# Patient Record
Sex: FEMALE | Race: BLACK OR AFRICAN AMERICAN | NOT HISPANIC OR LATINO | Employment: FULL TIME | URBAN - METROPOLITAN AREA
[De-identification: names, ages, dates, MRNs, and addresses within clinical notes are randomized per-mention and may not be internally consistent; named-entity substitution may affect disease eponyms.]

---

## 2021-10-16 ENCOUNTER — HOSPITAL ENCOUNTER (EMERGENCY)
Facility: OTHER | Age: 26
Discharge: HOME | End: 2021-10-16
Attending: EMERGENCY MEDICINE
Payer: OTHER GOVERNMENT

## 2021-10-16 VITALS
HEIGHT: 64 IN | TEMPERATURE: 98 F | DIASTOLIC BLOOD PRESSURE: 67 MMHG | OXYGEN SATURATION: 99 % | WEIGHT: 140 LBS | HEART RATE: 89 BPM | BODY MASS INDEX: 23.9 KG/M2 | SYSTOLIC BLOOD PRESSURE: 145 MMHG | RESPIRATION RATE: 16 BRPM

## 2021-10-16 DIAGNOSIS — Z20.822 LAB TEST NEGATIVE FOR COVID-19 VIRUS: Primary | ICD-10-CM

## 2021-10-16 LAB
CTP QC/QA: YES
HCV AB SERPL QL IA: NEGATIVE
HIV 1+2 AB+HIV1 P24 AG SERPL QL IA: NEGATIVE
SARS-COV-2 RDRP RESP QL NAA+PROBE: NEGATIVE

## 2021-10-16 PROCEDURE — U0002 COVID-19 LAB TEST NON-CDC: HCPCS | Performed by: EMERGENCY MEDICINE

## 2021-10-16 PROCEDURE — 86803 HEPATITIS C AB TEST: CPT | Performed by: EMERGENCY MEDICINE

## 2021-10-16 PROCEDURE — 99283 EMERGENCY DEPT VISIT LOW MDM: CPT | Mod: 25

## 2021-10-16 PROCEDURE — 87389 HIV-1 AG W/HIV-1&-2 AB AG IA: CPT | Performed by: EMERGENCY MEDICINE

## 2021-11-11 ENCOUNTER — IMMUNIZATION (OUTPATIENT)
Dept: INTERNAL MEDICINE | Facility: CLINIC | Age: 26
End: 2021-11-11
Payer: OTHER GOVERNMENT

## 2021-11-11 DIAGNOSIS — Z23 NEED FOR VACCINATION: Primary | ICD-10-CM

## 2021-11-11 PROCEDURE — 0011A COVID-19, MRNA, LNP-S, PF, 100 MCG/0.5 ML DOSE VACCINE: CPT | Mod: PBBFAC

## 2021-12-07 ENCOUNTER — IMMUNIZATION (OUTPATIENT)
Dept: INTERNAL MEDICINE | Facility: CLINIC | Age: 26
End: 2021-12-07
Payer: OTHER GOVERNMENT

## 2021-12-07 DIAGNOSIS — Z23 NEED FOR VACCINATION: Primary | ICD-10-CM

## 2021-12-07 PROCEDURE — 0012A COVID-19, MRNA, LNP-S, PF, 100 MCG/0.5 ML DOSE VACCINE: CPT | Mod: PBBFAC

## 2023-02-24 ENCOUNTER — HOSPITAL ENCOUNTER (EMERGENCY)
Facility: OTHER | Age: 28
Discharge: HOME | End: 2023-02-24
Attending: EMERGENCY MEDICINE

## 2023-02-24 ENCOUNTER — OFFICE VISIT (OUTPATIENT)
Dept: URGENT CARE | Facility: CLINIC | Age: 28
End: 2023-02-24

## 2023-02-24 VITALS
OXYGEN SATURATION: 98 % | HEIGHT: 64 IN | WEIGHT: 153 LBS | BODY MASS INDEX: 26.12 KG/M2 | TEMPERATURE: 99 F | RESPIRATION RATE: 18 BRPM | DIASTOLIC BLOOD PRESSURE: 85 MMHG | SYSTOLIC BLOOD PRESSURE: 132 MMHG | HEART RATE: 79 BPM

## 2023-02-24 VITALS
WEIGHT: 153 LBS | HEIGHT: 64 IN | HEART RATE: 96 BPM | DIASTOLIC BLOOD PRESSURE: 75 MMHG | RESPIRATION RATE: 18 BRPM | SYSTOLIC BLOOD PRESSURE: 139 MMHG | BODY MASS INDEX: 26.12 KG/M2 | TEMPERATURE: 98 F | OXYGEN SATURATION: 100 %

## 2023-02-24 DIAGNOSIS — R10.9 ABDOMINAL PAIN, UNSPECIFIED ABDOMINAL LOCATION: ICD-10-CM

## 2023-02-24 DIAGNOSIS — R11.2 NAUSEA AND VOMITING, UNSPECIFIED VOMITING TYPE: Primary | ICD-10-CM

## 2023-02-24 DIAGNOSIS — Z32.01 POSITIVE PREGNANCY TEST: ICD-10-CM

## 2023-02-24 DIAGNOSIS — O20.0 THREATENED MISCARRIAGE IN EARLY PREGNANCY: ICD-10-CM

## 2023-02-24 LAB
ABO + RH BLD: NORMAL
ALBUMIN SERPL BCP-MCNC: 4.6 G/DL (ref 3.5–5.2)
ALP SERPL-CCNC: 58 U/L (ref 55–135)
ALT SERPL W/O P-5'-P-CCNC: 14 U/L (ref 10–44)
ANION GAP SERPL CALC-SCNC: 10 MMOL/L (ref 8–16)
AST SERPL-CCNC: 13 U/L (ref 10–40)
B-HCG UR QL: POSITIVE
B-HCG UR QL: POSITIVE
BACTERIA #/AREA URNS HPF: NORMAL /HPF
BASOPHILS # BLD AUTO: 0.04 K/UL (ref 0–0.2)
BASOPHILS NFR BLD: 0.3 % (ref 0–1.9)
BILIRUB SERPL-MCNC: 0.9 MG/DL (ref 0.1–1)
BILIRUB UR QL STRIP: NEGATIVE
BILIRUB UR QL STRIP: NEGATIVE
BLD GP AB SCN CELLS X3 SERPL QL: NORMAL
BUN SERPL-MCNC: 13 MG/DL (ref 6–20)
CALCIUM SERPL-MCNC: 9.8 MG/DL (ref 8.7–10.5)
CHLORIDE SERPL-SCNC: 101 MMOL/L (ref 95–110)
CLARITY UR: CLEAR
CO2 SERPL-SCNC: 25 MMOL/L (ref 23–29)
COLOR UR: YELLOW
CREAT SERPL-MCNC: 0.8 MG/DL (ref 0.5–1.4)
CTP QC/QA: YES
CTP QC/QA: YES
DIFFERENTIAL METHOD: ABNORMAL
EOSINOPHIL # BLD AUTO: 0.1 K/UL (ref 0–0.5)
EOSINOPHIL NFR BLD: 0.7 % (ref 0–8)
ERYTHROCYTE [DISTWIDTH] IN BLOOD BY AUTOMATED COUNT: 12.5 % (ref 11.5–14.5)
EST. GFR  (NO RACE VARIABLE): >60 ML/MIN/1.73 M^2
GLUCOSE SERPL-MCNC: 110 MG/DL (ref 70–110)
GLUCOSE UR QL STRIP: NEGATIVE
GLUCOSE UR QL STRIP: NEGATIVE
HCG INTACT+B SERPL-ACNC: NORMAL MIU/ML
HCT VFR BLD AUTO: 40.3 % (ref 37–48.5)
HCV AB SERPL QL IA: NEGATIVE
HGB BLD-MCNC: 14.2 G/DL (ref 12–16)
HGB UR QL STRIP: NEGATIVE
HIV 1+2 AB+HIV1 P24 AG SERPL QL IA: NEGATIVE
HYALINE CASTS #/AREA URNS LPF: 0 /LPF
IMM GRANULOCYTES # BLD AUTO: 0.04 K/UL (ref 0–0.04)
IMM GRANULOCYTES NFR BLD AUTO: 0.3 % (ref 0–0.5)
INJECT RH IG VOL PATIENT: NORMAL ML
KETONES UR QL STRIP: ABNORMAL
KETONES UR QL STRIP: POSITIVE
LEUKOCYTE ESTERASE UR QL STRIP: NEGATIVE
LEUKOCYTE ESTERASE UR QL STRIP: NEGATIVE
LYMPHOCYTES # BLD AUTO: 1.7 K/UL (ref 1–4.8)
LYMPHOCYTES NFR BLD: 13.7 % (ref 18–48)
MCH RBC QN AUTO: 30.1 PG (ref 27–31)
MCHC RBC AUTO-ENTMCNC: 35.2 G/DL (ref 32–36)
MCV RBC AUTO: 86 FL (ref 82–98)
MICROSCOPIC COMMENT: NORMAL
MONOCYTES # BLD AUTO: 0.8 K/UL (ref 0.3–1)
MONOCYTES NFR BLD: 6.7 % (ref 4–15)
NEUTROPHILS # BLD AUTO: 9.4 K/UL (ref 1.8–7.7)
NEUTROPHILS NFR BLD: 78.3 % (ref 38–73)
NITRITE UR QL STRIP: NEGATIVE
NRBC BLD-RTO: 0 /100 WBC
PH UR STRIP: 6 [PH] (ref 5–8)
PH, POC UA: 5
PLATELET # BLD AUTO: 307 K/UL (ref 150–450)
PMV BLD AUTO: 10.6 FL (ref 9.2–12.9)
POC BLOOD, URINE: NEGATIVE
POC NITRATES, URINE: NEGATIVE
POTASSIUM SERPL-SCNC: 3.4 MMOL/L (ref 3.5–5.1)
PROT SERPL-MCNC: 8.2 G/DL (ref 6–8.4)
PROT UR QL STRIP: ABNORMAL
PROT UR QL STRIP: POSITIVE
RBC # BLD AUTO: 4.71 M/UL (ref 4–5.4)
RBC #/AREA URNS HPF: 1 /HPF (ref 0–4)
RH BLD: NORMAL
SODIUM SERPL-SCNC: 136 MMOL/L (ref 136–145)
SP GR UR STRIP: 1 (ref 1–1.03)
SP GR UR STRIP: 1.03 (ref 1–1.03)
SQUAMOUS #/AREA URNS HPF: 5 /HPF
URN SPEC COLLECT METH UR: ABNORMAL
UROBILINOGEN UR STRIP-ACNC: ABNORMAL (ref 0.1–1.1)
UROBILINOGEN UR STRIP-ACNC: NEGATIVE EU/DL
WBC # BLD AUTO: 12.02 K/UL (ref 3.9–12.7)
WBC #/AREA URNS HPF: 3 /HPF (ref 0–5)

## 2023-02-24 PROCEDURE — 96374 THER/PROPH/DIAG INJ IV PUSH: CPT

## 2023-02-24 PROCEDURE — 96361 HYDRATE IV INFUSION ADD-ON: CPT

## 2023-02-24 PROCEDURE — 86900 BLOOD TYPING SEROLOGIC ABO: CPT | Performed by: EMERGENCY MEDICINE

## 2023-02-24 PROCEDURE — 25000003 PHARM REV CODE 250: Performed by: EMERGENCY MEDICINE

## 2023-02-24 PROCEDURE — 99203 PR OFFICE/OUTPT VISIT, NEW, LEVL III, 30-44 MIN: ICD-10-PCS | Mod: TIER,S$GLB,, | Performed by: NURSE PRACTITIONER

## 2023-02-24 PROCEDURE — 87389 HIV-1 AG W/HIV-1&-2 AB AG IA: CPT | Performed by: EMERGENCY MEDICINE

## 2023-02-24 PROCEDURE — 81025 POCT URINE PREGNANCY: ICD-10-PCS | Mod: TIER,S$GLB,, | Performed by: NURSE PRACTITIONER

## 2023-02-24 PROCEDURE — 81003 POCT URINALYSIS, DIPSTICK, AUTOMATED, W/O SCOPE: ICD-10-PCS | Mod: QW,TIER,S$GLB, | Performed by: NURSE PRACTITIONER

## 2023-02-24 PROCEDURE — 86901 BLOOD TYPING SEROLOGIC RH(D): CPT | Performed by: EMERGENCY MEDICINE

## 2023-02-24 PROCEDURE — 84702 CHORIONIC GONADOTROPIN TEST: CPT | Performed by: EMERGENCY MEDICINE

## 2023-02-24 PROCEDURE — 85025 COMPLETE CBC W/AUTO DIFF WBC: CPT | Performed by: NURSE PRACTITIONER

## 2023-02-24 PROCEDURE — 99203 OFFICE O/P NEW LOW 30 MIN: CPT | Mod: TIER,S$GLB,, | Performed by: NURSE PRACTITIONER

## 2023-02-24 PROCEDURE — 81000 URINALYSIS NONAUTO W/SCOPE: CPT | Performed by: NURSE PRACTITIONER

## 2023-02-24 PROCEDURE — 63600175 PHARM REV CODE 636 W HCPCS: Performed by: EMERGENCY MEDICINE

## 2023-02-24 PROCEDURE — 81025 URINE PREGNANCY TEST: CPT | Performed by: NURSE PRACTITIONER

## 2023-02-24 PROCEDURE — 81003 URINALYSIS AUTO W/O SCOPE: CPT | Mod: QW,TIER,S$GLB, | Performed by: NURSE PRACTITIONER

## 2023-02-24 PROCEDURE — 86803 HEPATITIS C AB TEST: CPT | Performed by: EMERGENCY MEDICINE

## 2023-02-24 PROCEDURE — 96372 THER/PROPH/DIAG INJ SC/IM: CPT | Mod: 59 | Performed by: EMERGENCY MEDICINE

## 2023-02-24 PROCEDURE — 99285 EMERGENCY DEPT VISIT HI MDM: CPT | Mod: 25

## 2023-02-24 PROCEDURE — 86850 RBC ANTIBODY SCREEN: CPT | Performed by: EMERGENCY MEDICINE

## 2023-02-24 PROCEDURE — 63600519 RHOGAM PHARM REV CODE 636 ALT 250 W HCPCS: Performed by: EMERGENCY MEDICINE

## 2023-02-24 PROCEDURE — 80053 COMPREHEN METABOLIC PANEL: CPT | Performed by: NURSE PRACTITIONER

## 2023-02-24 PROCEDURE — 81025 URINE PREGNANCY TEST: CPT | Mod: TIER,S$GLB,, | Performed by: NURSE PRACTITIONER

## 2023-02-24 RX ORDER — ONDANSETRON 2 MG/ML
4 INJECTION INTRAMUSCULAR; INTRAVENOUS
Status: COMPLETED | OUTPATIENT
Start: 2023-02-24 | End: 2023-02-24

## 2023-02-24 RX ORDER — DEXTROSE, SODIUM CHLORIDE, SODIUM LACTATE, POTASSIUM CHLORIDE, AND CALCIUM CHLORIDE 5; .6; .31; .03; .02 G/100ML; G/100ML; G/100ML; G/100ML; G/100ML
INJECTION, SOLUTION INTRAVENOUS
Status: COMPLETED | OUTPATIENT
Start: 2023-02-24 | End: 2023-02-24

## 2023-02-24 RX ORDER — SODIUM CHLORIDE 9 MG/ML
1000 INJECTION, SOLUTION INTRAVENOUS
Status: COMPLETED | OUTPATIENT
Start: 2023-02-24 | End: 2023-02-24

## 2023-02-24 RX ORDER — ONDANSETRON 4 MG/1
4 TABLET, ORALLY DISINTEGRATING ORAL EVERY 6 HOURS PRN
Qty: 10 TABLET | Refills: 0 | Status: SHIPPED | OUTPATIENT
Start: 2023-02-24 | End: 2023-03-01 | Stop reason: SDUPTHER

## 2023-02-24 RX ADMIN — ONDANSETRON 4 MG: 2 INJECTION INTRAMUSCULAR; INTRAVENOUS at 05:02

## 2023-02-24 RX ADMIN — SODIUM CHLORIDE 1000 ML: 9 INJECTION, SOLUTION INTRAVENOUS at 05:02

## 2023-02-24 RX ADMIN — HUMAN RHO(D) IMMUNE GLOBULIN 300 MCG: 300 INJECTION, SOLUTION INTRAMUSCULAR at 08:02

## 2023-02-24 RX ADMIN — SODIUM CHLORIDE, SODIUM LACTATE, POTASSIUM CHLORIDE, CALCIUM CHLORIDE AND DEXTROSE MONOHYDRATE: 5; 600; 310; 30; 20 INJECTION, SOLUTION INTRAVENOUS at 07:02

## 2023-02-24 NOTE — PROGRESS NOTES
"Subjective:       Patient ID: Kika Johnson is a 28 y.o. female.    Vitals:  height is 5' 4" (1.626 m) and weight is 69.4 kg (153 lb). Her temperature is 99 °F (37.2 °C). Her blood pressure is 132/85 and her pulse is 79. Her respiration is 18 and oxygen saturation is 98%.     Chief Complaint: Nausea    Patient state she has been vomiting and having diarrhea for the past 2-3 days.  Provider note begins below    Patient reports menstrual cycles 1 month light.  She is been vomiting and having diarrhea for the last 2 days.  Not keeping any liquids or food down.  Reports having chills.    Diarrhea   This is a new problem. The current episode started in the past 7 days. The problem has been gradually worsening. Associated symptoms include chills, a fever and vomiting. Pertinent negatives include no coughing. Nothing aggravates the symptoms. She has tried nothing for the symptoms.     Constitution: Positive for chills, fatigue and fever.   Cardiovascular:  Negative for chest pain and sob on exertion.   Respiratory:  Negative for cough and shortness of breath.    Gastrointestinal:  Positive for nausea, vomiting and diarrhea.     Objective:      Physical Exam   Constitutional: She is oriented to person, place, and time.   HENT:   Head: Normocephalic and atraumatic.   Cardiovascular: Normal rate.   Pulmonary/Chest: Effort normal. No respiratory distress.   Abdominal: Normal appearance.   Neurological: She is alert and oriented to person, place, and time.   Skin: Skin is warm and dry.   Psychiatric: Her behavior is normal. Mood normal.         Results for orders placed or performed in visit on 02/24/23   POCT Urinalysis, Dipstick, Automated, W/O Scope   Result Value Ref Range    POC Blood, Urine Negative Negative    POC Bilirubin, Urine Negative Negative    POC Urobilinogen, Urine norm 0.1 - 1.1    POC Ketones, Urine Positive (A) Negative    POC Protein, Urine Positive (A) Negative    POC Nitrates, Urine Negative Negative    " POC Glucose, Urine Negative Negative    pH, UA 5.0     POC Specific Gravity, Urine 1.005 1.003 - 1.029    POC Leukocytes, Urine Negative Negative   POCT URINE PREGNANCY   Result Value Ref Range    POC Preg Test, Ur Positive (A) Negative     Acceptable Yes       Assessment:       1. Nausea and vomiting, unspecified vomiting type    2. Positive pregnancy test    3. Abdominal pain, unspecified abdominal location          Plan:       UA  UPT positive  ED precautions  Dehydration.  Concern for tubal pregnancy        Nausea and vomiting, unspecified vomiting type  -     POCT Urinalysis, Dipstick, Automated, W/O Scope  -     POCT URINE PREGNANCY    Positive pregnancy test    Abdominal pain, unspecified abdominal location

## 2023-02-24 NOTE — ED NOTES
Patient identifiers verified and correct for patient  C/C: n/v/d.    APPEARANCE: AAOx3.   SKIN: warm, dry and intact. No breakdown or bruising. Dry mucous membranes  MUSCULOSKELETAL: Patient moving all extremities spontaneously, no obvious swelling or deformities noted. Ambulates independently. Generalized weakness.  RESPIRATORY: Denies shortness of breath.Respirations unlabored. Clear upon auscultation.  CARDIAC: Denies CP,  distal pulses; no peripheral edema  ABDOMEN: abdominal cramping generalized w/ diarrhea. Estimated 20 episodes yesterday.3 episodes today. Nausea for several weeks with worsening over past 2 days. Reports being unable to eat or drink.UPT positive  : voids spontaneously, denies difficulty  Neurologic: AAO x 4; follows commands equal strength in all extremities; denies numbness/tingling. Denies dizziness

## 2023-02-24 NOTE — FIRST PROVIDER EVALUATION
Emergency Department TeleTriage Encounter Note      CHIEF COMPLAINT    Chief Complaint   Patient presents with    Abdominal Pain     Pt reports generalized abdominal pain, vaginal spotting, n/v/d, abdominal pain x 3 days; Pt took positive UPT today, states LMP January 13       VITAL SIGNS   Initial Vitals [02/24/23 1437]   BP Pulse Resp Temp SpO2   (!) 159/89 92 20 98.2 °F (36.8 °C) 100 %      MAP       --            ALLERGIES    Review of patient's allergies indicates:  No Known Allergies    PROVIDER TRIAGE NOTE  TeleTriage Note: Kika Johnson, a nontoxic/well appearing, 28 y.o. female, presented to the ED with c/o left lower abdominal pain and vaginal bleeding that began Monday. LMP 1/13/23. First pregnancy.    All ED beds are full at present; patient notified of this status.  Patient seen and medically screened by Nurse Practitioner via teletriage. Orders initiated at triage to expedite care.  Patient is stable to return to the waiting room and will be placed in an ED bed when available.  Care will be transferred to an alternate provider when patient has been placed in an Exam Room from the [facility] for physical exam, additional orders, and disposition.  2:50 PM Elsa Dickinson, DNP, FNP-C        ORDERS  Labs Reviewed   HIV 1 / 2 ANTIBODY   HEPATITIS C ANTIBODY   CBC W/ AUTO DIFFERENTIAL   COMPREHENSIVE METABOLIC PANEL   URINALYSIS, REFLEX TO URINE CULTURE   POCT URINE PREGNANCY       ED Orders (720h ago, onward)      Start Ordered     Status Ordering Provider    02/24/23 1453 02/24/23 1452  POCT urine pregnancy  Once         Ordered ELSA DICKINSON    02/24/23 1453 02/24/23 1452  Urinalysis, Reflex to Urine Culture Urine, Clean Catch  STAT         Ordered ELSA DICKINSON    02/24/23 1452 02/24/23 1452  Insert peripheral IV  Continuous         Ordered ELSA DICKINSON    02/24/23 1452 02/24/23 1452  CBC auto differential  STAT         Ordered ELSA DICKINSON    02/24/23 1452 02/24/23 1452  Comprehensive  metabolic panel  STAT         Ordered FACUNDO FRANK.    02/24/23 1452 02/24/23 1452  US OB <14 Wks TransAbd & TransVag, Single Gestation (XPD)  1 time imaging         Ordered FACUNDO FRANK.    02/24/23 1439 02/24/23 1438  HIV 1/2 Ag/Ab (4th Gen)  STAT         Ordered JUSTINE ROSA    02/24/23 1439 02/24/23 1438  Hepatitis C Antibody  STAT         Ordered JUSTINE ROSA.              Virtual Visit Note: The provider triage portion of this emergency department evaluation and documentation was performed via [employer], a HIPAA-compliant telemedicine application, in concert with a tele-presenter in the room. A face to face patient evaluation with one of my colleagues will occur once the patient is placed in an emergency department room.      DISCLAIMER: This note was prepared with [employer] voice recognition transcription software. Garbled syntax, mangled pronouns, and other bizarre constructions may be attributed to that software system.

## 2023-02-24 NOTE — ED PROVIDER NOTES
"SCRIBE #1 NOTE: I, Priscilla Almazan, am scribing for, and in the presence of,  Andre Renteria DO.       Source of History:  The patient.    Chief complaint:  Abdominal Pain (Pt reports generalized abdominal pain, vaginal spotting, n/v/d, abdominal pain x 3 days; Pt took positive UPT today, states LMP )      HPI:  Kika Johnson is a  28 y.o. female presenting with abdominal pain that began four days ago. The patient also reports nausea, vomiting, and diarrhea, including episodes of vomiting today. She states she is unable to retain food or liquids. The patient took a positive at home pregnancy test today. She notes intermittent mild vaginal spotting over the past two weeks. She denies associated dysuria.     This is the extent to the patients complaints today here in the emergency department.    ROS:   See HPI.    Review of patient's allergies indicates:  No Known Allergies    PMH:  As per HPI and below:  No past medical history on file.  No past surgical history on file.    Social History     Tobacco Use    Smoking status: Never    Smokeless tobacco: Never   Substance Use Topics    Alcohol use: Yes    Drug use: Yes     Types: Marijuana       Physical Exam:    /75   Pulse 96   Temp 98.2 °F (36.8 °C) (Oral)   Resp 18   Ht 5' 4" (1.626 m)   Wt 69.4 kg (153 lb)   LMP 2023   SpO2 100%   BMI 26.26 kg/m²   Nursing note and vital signs reviewed.  Constitutional: No acute distress.  Nontoxic  Cardiovascular: Regular rate and rhythm.  No murmurs. No gallops. No rubs  Respiratory: Clear to auscultation bilaterally.  Good air movement.  No wheezes.  No rhonchi. No rales. No accessory muscle use.  Abdomen:  Mild mid and epigastric tenderness to palpation. Soft.  Not distended.  No guarding.  No rebound. Non-peritoneal.  Neuro: Alert. No focal deficits.  Skin: No rashes seen.     I decided to obtain the patient's medical records.  Summary of Medical Records:      MDM/ Differential Dx: "   28-year-old female presents with abdominal pain.  Symptoms likely secondary to viral etiology.  Also found out she was pregnant so could be due to this as well.  Ultrasound confirms intrauterine uterus.  As she did have some spotting will get an Rh to evaluate for need for RhoGAM.  Otherwise appears well.  Will give IV fluids get blood work to evaluate for metabolic derangement, acute kidney injury or dehydration.  Her abdominal exam otherwise is benign do not feel there is any indication for imaging as I have considered but do not suspect diverticulitis or appendicitis or cholelithiasis.    Social Concerns:  Patient is pregnant so likelihood if this is hyperemesis gravidarum will return     ED Course as of 02/24/23 2108 Fri Feb 24, 2023   1650 US OB <14 Wks TransAbd & TransVag, Single Gestation (XPD)  Ultrasound shows live IUP with gestational age of 6 weeks 0 days.  Heart rate 120. [SM]   1651 WBC: 12.02 [SM]   1651 Hemoglobin: 14.2 [SM]   1651 Platelets: 307 [SM]   1651 Creatinine: 0.8 [SM]   1651 Sodium: 136 [SM]   1651 Potassium(!): 3.4 [SM]   1651 Ketones, UA(!): 3+  Consistent with dehydration   [SM]   1738 Upon re-evaluation patient is feeling much better.  No longer nauseated.  Will finish IV fluids do a p.o. challenge and then plan for discharge. [SM]   1738 Urinalysis Microscopic  Negative for suspicion of UTI. [SM]   1835 Rh Type: NEG  Will need rhogam prior to being discharged as she stated she has some vaginal spotting.   [SM]   1858 Discussed the results with the patient.  Discussed the need for RhoGAM.  After she receives this will go ahead and discharge.  Will place a referral for OB for care for her pregnancy.      No further workup is indicated in the emergency department today.  I updated pt regarding results and I counseled pt regarding supportive care measures.  Diagnosis and treatment plan explained to patient. I have answered all questions and the patient is satisfied with the plan of  care. Patient discharged home in stable condition.      [SM]      ED Course User Index  [SM] Andre Renteria DO              Scribe Attestation:   Scribe #1: I performed the above scribed service and the documentation accurately describes the services I performed. I attest to the accuracy of the note.    Physician Attestation for Scribe: I, Dr Andre Renteria, reviewed documentation as scribed in my presence, which is both accurate and complete.    Diagnostic Impression:    1. Nausea and vomiting, unspecified vomiting type    2. Threatened miscarriage in early pregnancy         ED Disposition Condition    Discharge Stable            ED Prescriptions       Medication Sig Dispense Start Date End Date Auth. Provider    ondansetron (ZOFRAN-ODT) 4 MG TbDL Take 1 tablet (4 mg total) by mouth every 6 (six) hours as needed (nausea vomiting). 10 tablet 2/24/2023 3/3/2023 Andre Renteria DO          Follow-up Information       Follow up With Specialties Details Why Contact [facility] Obstetrics and Gynecology Schedule an appointment as soon as possible for a visit   [address], [address]  [facility] [address]  [phone]             Andre Renteria DO  02/24/23 1913

## 2023-02-25 NOTE — DISCHARGE INSTRUCTIONS
Both medications are over the counter at the pharmacy.    Take together up to 3 times per day.    Pyridoxine (Vitamin B6) - 25 mg  doxylamine (unisom) - 25 mg

## 2023-02-25 NOTE — ED TRIAGE NOTES
Pt presents to ER with complaint of nausea, vomiting and generalized abdominal pain. Pt also reports light vaginal bleeding. Pt reports LMP of  A0. Pt in no acute distress at this time with chest noted to equal rise and fall. Pt AAOx4.

## 2023-03-01 ENCOUNTER — INITIAL PRENATAL (OUTPATIENT)
Dept: OBSTETRICS AND GYNECOLOGY | Facility: CLINIC | Age: 28
End: 2023-03-01
Attending: EMERGENCY MEDICINE
Payer: COMMERCIAL

## 2023-03-01 DIAGNOSIS — O26.891 VAGINAL DISCHARGE DURING PREGNANCY IN FIRST TRIMESTER: ICD-10-CM

## 2023-03-01 DIAGNOSIS — Z11.51 SCREENING FOR HPV (HUMAN PAPILLOMAVIRUS): ICD-10-CM

## 2023-03-01 DIAGNOSIS — O21.9 NAUSEA AND VOMITING IN PREGNANCY: ICD-10-CM

## 2023-03-01 DIAGNOSIS — Z12.4 ENCOUNTER FOR PAPANICOLAOU SMEAR FOR CERVICAL CANCER SCREENING: ICD-10-CM

## 2023-03-01 DIAGNOSIS — N91.2 AMENORRHEA: Primary | ICD-10-CM

## 2023-03-01 DIAGNOSIS — O20.0 THREATENED MISCARRIAGE IN EARLY PREGNANCY: ICD-10-CM

## 2023-03-01 DIAGNOSIS — N89.8 VAGINAL DISCHARGE DURING PREGNANCY IN FIRST TRIMESTER: ICD-10-CM

## 2023-03-01 PROCEDURE — 87591 N.GONORRHOEAE DNA AMP PROB: CPT | Performed by: REGISTERED NURSE

## 2023-03-01 PROCEDURE — 99213 PR OFFICE/OUTPT VISIT, EST, LEVL III, 20-29 MIN: ICD-10-PCS | Mod: S$GLB,,, | Performed by: REGISTERED NURSE

## 2023-03-01 PROCEDURE — 81514 NFCT DS BV&VAGINITIS DNA ALG: CPT | Performed by: REGISTERED NURSE

## 2023-03-01 PROCEDURE — 87624 HPV HI-RISK TYP POOLED RSLT: CPT | Performed by: REGISTERED NURSE

## 2023-03-01 PROCEDURE — 99999 PR PBB SHADOW E&M-EST. PATIENT-LVL II: CPT | Mod: PBBFAC,,, | Performed by: REGISTERED NURSE

## 2023-03-01 PROCEDURE — 99213 OFFICE O/P EST LOW 20 MIN: CPT | Mod: S$GLB,,, | Performed by: REGISTERED NURSE

## 2023-03-01 PROCEDURE — 99999 PR PBB SHADOW E&M-EST. PATIENT-LVL II: ICD-10-PCS | Mod: PBBFAC,,, | Performed by: REGISTERED NURSE

## 2023-03-01 PROCEDURE — 88142 CYTOPATH C/V THIN LAYER: CPT | Performed by: REGISTERED NURSE

## 2023-03-01 RX ORDER — ONDANSETRON 4 MG/1
4 TABLET, FILM COATED ORAL EVERY 6 HOURS PRN
Qty: 30 TABLET | Refills: 1 | Status: SHIPPED | OUTPATIENT
Start: 2023-03-01 | End: 2023-04-09 | Stop reason: SDUPTHER

## 2023-03-03 LAB
C TRACH DNA SPEC QL NAA+PROBE: NOT DETECTED
N GONORRHOEA DNA SPEC QL NAA+PROBE: NOT DETECTED

## 2023-03-04 LAB
BACTERIAL VAGINOSIS DNA: POSITIVE
CANDIDA GLABRATA DNA: NEGATIVE
CANDIDA KRUSEI DNA: NEGATIVE
CANDIDA RRNA VAG QL PROBE: NEGATIVE
T VAGINALIS RRNA GENITAL QL PROBE: NEGATIVE

## 2023-03-07 LAB
FINAL PATHOLOGIC DIAGNOSIS: NORMAL
Lab: NORMAL

## 2023-03-08 LAB
HPV HR 12 DNA SPEC QL NAA+PROBE: NEGATIVE
HPV16 AG SPEC QL: NEGATIVE
HPV18 DNA SPEC QL NAA+PROBE: NEGATIVE

## 2023-03-09 ENCOUNTER — HOSPITAL ENCOUNTER (EMERGENCY)
Facility: OTHER | Age: 28
Discharge: HOME | End: 2023-03-09
Attending: EMERGENCY MEDICINE
Payer: COMMERCIAL

## 2023-03-09 VITALS
TEMPERATURE: 98 F | WEIGHT: 145 LBS | RESPIRATION RATE: 15 BRPM | SYSTOLIC BLOOD PRESSURE: 122 MMHG | BODY MASS INDEX: 24.75 KG/M2 | HEART RATE: 92 BPM | HEIGHT: 64 IN | OXYGEN SATURATION: 100 % | DIASTOLIC BLOOD PRESSURE: 75 MMHG

## 2023-03-09 DIAGNOSIS — O21.9 NAUSEA/VOMITING IN PREGNANCY: ICD-10-CM

## 2023-03-09 DIAGNOSIS — E86.0 DEHYDRATION: ICD-10-CM

## 2023-03-09 DIAGNOSIS — Z3A.01 LESS THAN 8 WEEKS GESTATION OF PREGNANCY: ICD-10-CM

## 2023-03-09 DIAGNOSIS — E87.1 HYPONATREMIA: Primary | ICD-10-CM

## 2023-03-09 LAB
ALBUMIN SERPL BCP-MCNC: 4.2 G/DL (ref 3.5–5.2)
ALP SERPL-CCNC: 53 U/L (ref 55–135)
ALT SERPL W/O P-5'-P-CCNC: 15 U/L (ref 10–44)
ANION GAP SERPL CALC-SCNC: 13 MMOL/L (ref 8–16)
AST SERPL-CCNC: 19 U/L (ref 10–40)
B-HCG UR QL: POSITIVE
BACTERIA #/AREA URNS HPF: ABNORMAL /HPF
BASOPHILS # BLD AUTO: 0.04 K/UL (ref 0–0.2)
BASOPHILS NFR BLD: 0.3 % (ref 0–1.9)
BILIRUB SERPL-MCNC: 0.8 MG/DL (ref 0.1–1)
BILIRUB UR QL STRIP: NEGATIVE
BNP SERPL-MCNC: <10 PG/ML (ref 0–99)
BUN SERPL-MCNC: 10 MG/DL (ref 6–20)
CALCIUM SERPL-MCNC: 9.8 MG/DL (ref 8.7–10.5)
CHLORIDE SERPL-SCNC: 94 MMOL/L (ref 95–110)
CLARITY UR: CLEAR
CO2 SERPL-SCNC: 25 MMOL/L (ref 23–29)
COLOR UR: YELLOW
CREAT SERPL-MCNC: 0.7 MG/DL (ref 0.5–1.4)
CTP QC/QA: YES
DIFFERENTIAL METHOD: ABNORMAL
EOSINOPHIL # BLD AUTO: 0.1 K/UL (ref 0–0.5)
EOSINOPHIL NFR BLD: 0.6 % (ref 0–8)
ERYTHROCYTE [DISTWIDTH] IN BLOOD BY AUTOMATED COUNT: 12 % (ref 11.5–14.5)
EST. GFR  (NO RACE VARIABLE): >60 ML/MIN/1.73 M^2
GLUCOSE SERPL-MCNC: 103 MG/DL (ref 70–110)
GLUCOSE UR QL STRIP: NEGATIVE
HCT VFR BLD AUTO: 40.1 % (ref 37–48.5)
HGB BLD-MCNC: 14.2 G/DL (ref 12–16)
HGB UR QL STRIP: NEGATIVE
HYALINE CASTS #/AREA URNS LPF: 28 /LPF
IMM GRANULOCYTES # BLD AUTO: 0.06 K/UL (ref 0–0.04)
IMM GRANULOCYTES NFR BLD AUTO: 0.4 % (ref 0–0.5)
KETONES UR QL STRIP: ABNORMAL
LEUKOCYTE ESTERASE UR QL STRIP: NEGATIVE
LIPASE SERPL-CCNC: 10 U/L (ref 4–60)
LYMPHOCYTES # BLD AUTO: 1.7 K/UL (ref 1–4.8)
LYMPHOCYTES NFR BLD: 12.4 % (ref 18–48)
MCH RBC QN AUTO: 29.7 PG (ref 27–31)
MCHC RBC AUTO-ENTMCNC: 35.4 G/DL (ref 32–36)
MCV RBC AUTO: 84 FL (ref 82–98)
MICROSCOPIC COMMENT: ABNORMAL
MONOCYTES # BLD AUTO: 0.9 K/UL (ref 0.3–1)
MONOCYTES NFR BLD: 6.1 % (ref 4–15)
NEUTROPHILS # BLD AUTO: 11.2 K/UL (ref 1.8–7.7)
NEUTROPHILS NFR BLD: 80.2 % (ref 38–73)
NITRITE UR QL STRIP: NEGATIVE
NRBC BLD-RTO: 0 /100 WBC
PH UR STRIP: 6 [PH] (ref 5–8)
PLATELET # BLD AUTO: 269 K/UL (ref 150–450)
PMV BLD AUTO: 10.7 FL (ref 9.2–12.9)
POC MOLECULAR INFLUENZA A AGN: NEGATIVE
POC MOLECULAR INFLUENZA B AGN: NEGATIVE
POTASSIUM SERPL-SCNC: 3.9 MMOL/L (ref 3.5–5.1)
PROT SERPL-MCNC: 7.6 G/DL (ref 6–8.4)
PROT UR QL STRIP: ABNORMAL
RBC # BLD AUTO: 4.78 M/UL (ref 4–5.4)
RBC #/AREA URNS HPF: 1 /HPF (ref 0–4)
SARS-COV-2 RDRP RESP QL NAA+PROBE: NEGATIVE
SODIUM SERPL-SCNC: 132 MMOL/L (ref 136–145)
SP GR UR STRIP: 1.02 (ref 1–1.03)
SQUAMOUS #/AREA URNS HPF: 7 /HPF
URN SPEC COLLECT METH UR: ABNORMAL
UROBILINOGEN UR STRIP-ACNC: NEGATIVE EU/DL
WBC # BLD AUTO: 14.02 K/UL (ref 3.9–12.7)
WBC #/AREA URNS HPF: 1 /HPF (ref 0–5)

## 2023-03-09 PROCEDURE — 99284 EMERGENCY DEPT VISIT MOD MDM: CPT | Mod: 25

## 2023-03-09 PROCEDURE — 63600175 PHARM REV CODE 636 W HCPCS: Performed by: PHYSICIAN ASSISTANT

## 2023-03-09 PROCEDURE — 25000003 PHARM REV CODE 250: Performed by: NURSE PRACTITIONER

## 2023-03-09 PROCEDURE — 81000 URINALYSIS NONAUTO W/SCOPE: CPT | Performed by: PHYSICIAN ASSISTANT

## 2023-03-09 PROCEDURE — 80053 COMPREHEN METABOLIC PANEL: CPT | Performed by: PHYSICIAN ASSISTANT

## 2023-03-09 PROCEDURE — 96374 THER/PROPH/DIAG INJ IV PUSH: CPT

## 2023-03-09 PROCEDURE — 85025 COMPLETE CBC W/AUTO DIFF WBC: CPT | Performed by: PHYSICIAN ASSISTANT

## 2023-03-09 PROCEDURE — 96375 TX/PRO/DX INJ NEW DRUG ADDON: CPT

## 2023-03-09 PROCEDURE — 63600175 PHARM REV CODE 636 W HCPCS: Performed by: NURSE PRACTITIONER

## 2023-03-09 PROCEDURE — 83690 ASSAY OF LIPASE: CPT | Performed by: PHYSICIAN ASSISTANT

## 2023-03-09 PROCEDURE — 83880 ASSAY OF NATRIURETIC PEPTIDE: CPT | Performed by: PHYSICIAN ASSISTANT

## 2023-03-09 PROCEDURE — 81025 URINE PREGNANCY TEST: CPT | Performed by: NURSE PRACTITIONER

## 2023-03-09 PROCEDURE — 96361 HYDRATE IV INFUSION ADD-ON: CPT

## 2023-03-09 RX ORDER — METOCLOPRAMIDE HYDROCHLORIDE 5 MG/ML
10 INJECTION INTRAMUSCULAR; INTRAVENOUS
Status: COMPLETED | OUTPATIENT
Start: 2023-03-09 | End: 2023-03-09

## 2023-03-09 RX ORDER — ONDANSETRON 2 MG/ML
8 INJECTION INTRAMUSCULAR; INTRAVENOUS
Status: COMPLETED | OUTPATIENT
Start: 2023-03-09 | End: 2023-03-09

## 2023-03-09 RX ORDER — PROMETHAZINE HYDROCHLORIDE 25 MG/1
25 SUPPOSITORY RECTAL EVERY 6 HOURS PRN
Qty: 10 SUPPOSITORY | Refills: 0 | Status: SHIPPED | OUTPATIENT
Start: 2023-03-09 | End: 2023-03-27

## 2023-03-09 RX ORDER — ONDANSETRON 4 MG/1
4 TABLET, ORALLY DISINTEGRATING ORAL EVERY 8 HOURS PRN
Qty: 30 TABLET | Refills: 0 | Status: SHIPPED | OUTPATIENT
Start: 2023-03-09 | End: 2023-04-28 | Stop reason: SDUPTHER

## 2023-03-09 RX ORDER — SCOLOPAMINE TRANSDERMAL SYSTEM 1 MG/1
1 PATCH, EXTENDED RELEASE TRANSDERMAL
Status: DISCONTINUED | OUTPATIENT
Start: 2023-03-09 | End: 2023-03-10 | Stop reason: HOSPADM

## 2023-03-09 RX ADMIN — METOCLOPRAMIDE 10 MG: 5 INJECTION, SOLUTION INTRAMUSCULAR; INTRAVENOUS at 09:03

## 2023-03-09 RX ADMIN — SODIUM CHLORIDE, POTASSIUM CHLORIDE, SODIUM LACTATE AND CALCIUM CHLORIDE 1000 ML: 600; 310; 30; 20 INJECTION, SOLUTION INTRAVENOUS at 09:03

## 2023-03-09 RX ADMIN — ONDANSETRON 8 MG: 2 INJECTION INTRAMUSCULAR; INTRAVENOUS at 09:03

## 2023-03-09 RX ADMIN — SODIUM CHLORIDE 1000 ML: 9 INJECTION, SOLUTION INTRAVENOUS at 09:03

## 2023-03-09 RX ADMIN — SCOPOLAMINE 1 PATCH: 1.5 PATCH, EXTENDED RELEASE TRANSDERMAL at 09:03

## 2023-03-09 NOTE — Clinical Note
"Kika Zaman" Alex was seen and treated in our emergency department on 3/9/2023.  She may return to work on 03/13/2023.       If you have any questions or concerns, please don't hesitate to call.      Mikaela Grimm NP"

## 2023-03-09 NOTE — Clinical Note
"Kika Zaman" Alex was seen and treated in our emergency department on 3/9/2023.  She may return to work on 03/11/2023.       If you have any questions or concerns, please don't hesitate to call.       RN    "

## 2023-03-10 ENCOUNTER — HOSPITAL ENCOUNTER (OUTPATIENT)
Dept: PERINATAL CARE | Facility: OTHER | Age: 28
Discharge: HOME | End: 2023-03-10
Attending: REGISTERED NURSE
Payer: COMMERCIAL

## 2023-03-10 DIAGNOSIS — N91.2 AMENORRHEA: ICD-10-CM

## 2023-03-10 PROCEDURE — 76801 OB US < 14 WKS SINGLE FETUS: CPT

## 2023-03-10 NOTE — ED PROVIDER NOTES
Source of History:  Patient    Chief complaint:  Diarrhea and Abdominal Cramping (Diarrhea and abd cramping since this AM, states hasn't been able to eat since , vomits everyday, is 7 weeks gestation)      HPI:  Kika Johnson is a 28 y.o. female  approximately 7 weeks pregnant female presenting with nausea and vomiting in pregnancy.  Patient states that since  she has been having nausea and vomiting.  She states she is able to tolerate water but throws up her food.  She is also reporting intermittent diarrhea.  She reports abdominal pain after vomiting.  No blood noted in her edematous or diarrhea.  Diarrhea does not occur every time, sporadic.  Patient denies abdominal pain currently.  She has been able to tolerate water today.  No weight loss.  Patient has had imaging confirming IUP.  She has an ultrasound and lab work tomorrow.  No fever, chills, vaginal bleeding, dysuria, flank pain, back pain.    This is the extent to the patients complaints today here in the emergency department.    ROS: As per HPI and below:  General: No fever.  No chills.  Eyes: No visual changes.  ENT: No sore throat. No ear pain  Head: No headache.    Chest: No shortness of breath.  Cardiovascular: No chest pain.  Abdomen: + abdominal cramping.  Positive nausea or vomiting. Positive diarrhea.  Negative hematemesis, negative hematochezia  Genito-Urinary: No abnormal urination.  Neurologic: No focal weakness.  No numbness.  MSK: no back pain.  Integument: No rashes or lesions.  Hematologic: No easy bruising.  Endocrine: No excessive thirst or urination.    Review of patient's allergies indicates:  No Known Allergies    PMH:  As per HPI and below:  No past medical history on file.  No past surgical history on file.    Social History     Tobacco Use    Smoking status: Never    Smokeless tobacco: Never   Substance Use Topics    Alcohol use: Yes    Drug use: Yes     Types: Marijuana       Physical Exam:    BP  "122/75   Pulse 92   Temp 98.1 °F (36.7 °C) (Oral)   Resp 15   Ht 5' 4" (1.626 m)   Wt 65.8 kg (145 lb)   LMP 01/13/2023   SpO2 100%   BMI 24.89 kg/m²   Nursing note and vital signs reviewed.  Appearance: No acute distress.  Eyes: No conjunctival injection.  ENT: Oropharynx clear.    Chest/ Respiratory: Clear to auscultation bilaterally.  Good air movement.  No wheezes.  No rhonchi. No rales. No accessory muscle use.  Cardiovascular: Regular rate and rhythm.  No murmurs. No gallops. No rubs.  Abdomen: Soft.  Not distended.  Nontender.  No guarding.  No rebound. Non-peritoneal.  Musculoskeletal: Good range of motion all joints.  No deformities.  Neck supple.  No meningismus.  Skin: No rashes seen.  Good turgor.  No abrasions.  No ecchymoses.  Neurologic: Motor intact.  Sensation intact.  Cerebellar intact.  Cranial nerves intact.  Mental Status:  Alert and oriented x 3.  Appropriate, conversant    Labs that have been ordered have been independently reviewed and interpreted by myself.    I decided to obtain the patient's medical records.  Summary of Medical Records:  TVUS 2/24/23:  Impression:     Single live intrauterine pregnancy with estimated gestational age 6 weeks 0 days. and an EMERY of 10/20/2023.     Small subchorionic hemorrhage.    Labs Reviewed   CBC W/ AUTO DIFFERENTIAL - Abnormal; Notable for the following components:       Result Value    WBC 14.02 (*)     Gran # (ANC) 11.2 (*)     Immature Grans (Abs) 0.06 (*)     Gran % 80.2 (*)     Lymph % 12.4 (*)     All other components within normal limits   COMPREHENSIVE METABOLIC PANEL - Abnormal; Notable for the following components:    Sodium 132 (*)     Chloride 94 (*)     Alkaline Phosphatase 53 (*)     All other components within normal limits   URINALYSIS, REFLEX TO URINE CULTURE - Abnormal; Notable for the following components:    Protein, UA 1+ (*)     Ketones, UA 3+ (*)     All other components within normal limits    Narrative:     Specimen " Source->Urine   URINALYSIS MICROSCOPIC - Abnormal; Notable for the following components:    Hyaline Casts, UA 28 (*)     All other components within normal limits    Narrative:     Specimen Source->Urine   POCT URINE PREGNANCY - Abnormal; Notable for the following components:    POC Preg Test, Ur Positive (*)     All other components within normal limits   B-TYPE NATRIURETIC PEPTIDE   LIPASE   SARS-COV-2 RDRP GENE   POCT INFLUENZA A/B MOLECULAR       Imaging Results    None         Initial Impression/ Differential Dx:  Urgent evaluation of 28 y.o. female presenting with nausea, vomiting and intermittent diarrhea in pregnancy. Patient is afebrile, not toxic appearing and hemodynamically stable.  On exam abdomen is soft and nontender.  No vaginal bleeding or abdominal cramping at this time.  Patient has had confirmatory imaging with noted small subchorionic hemorrhage on prior imaging. No vaginal bleeding currently.  Lab work in process from tele triage.  Will give IV fluids, apply scopolamine patch and treat with Zofran and continue to reassess. Abdomen is soft and nontender. Given no vaginal bleeding and IUP confirmed, no indication for TVUS at this time. Will assess FHT.    Differential Diagnosis includes, but is not limited to:  Hyperemesis gravidarum, dehydration, anemia, electrolyte abnormality, metabolic derangement, UTI/pyelonephritis, pregnancy complication, COVID, influenza.      MDM:    COVID negative.  Flu negative.  Ketonuria noted, no convincing evidence of infection on UA.  Hyaline casts consistent with dehydration.  CBC with mild leukocytosis which may be reactionary from repeated episodes of emesis.  Normal H&H.  CMP with mild hyponatremia and low chloride, normal kidney function, no elevation of LFTs.  Lipase WNL.  Unclear why BNP ordered from tele triage, however normal.  . After treatment in the emergency room, patient states that she is feeling better.  Patient successfully p.o. challenged  with fluids and solids.  Offered patient observation for continued supportive care and lab recheck in the morning given mild hyponatremia, however patient states a preference for going home.  She has OBGYN follow-up tomorrow.  Patient has the components for Diclegis at home.  She states she is been taking them independently, counseled her to start taking them together and at night to help prevent nausea during the day.  Will also discharge home with Zofran and Phenergan suppositories to trial.  Counseled her to follow-up with her OBGYN. Patient educated on signs and symptoms to monitor for and when to return to ED. Patient verbalized understanding agrees with treatment plan. All questions and concerns addressed.                        Diagnostic Impression:    1. Hyponatremia    2. Nausea/vomiting in pregnancy    3. Dehydration    4. Less than 8 weeks gestation of pregnancy         ED Disposition Condition    Discharge Good            ED Prescriptions       Medication Sig Dispense Start Date End Date Auth. Provider    ondansetron (ZOFRAN-ODT) 4 MG TbDL Take 1 tablet (4 mg total) by mouth every 8 (eight) hours as needed. 30 tablet 3/9/2023 -- Mikaela Grimm NP    promethazine (PHENERGAN) 25 MG suppository Place 1 suppository (25 mg total) rectally every 6 (six) hours as needed for Nausea. 10 suppository 3/9/2023 -- Mikaela Grimm NP          Follow-up Information       Follow up With Specialties Details Why Contact Info    Ellen Bo NP Obstetrics and Gynecology   [address]  [facility] [address]  [phone]               Mikaela Grimm NP  03/10/23 0256

## 2023-03-10 NOTE — FIRST PROVIDER EVALUATION
Emergency Department TeleTriage Encounter Note      CHIEF COMPLAINT    Chief Complaint   Patient presents with    Diarrhea    Abdominal Cramping     Diarrhea and abd cramping since this AM, states hasn't been able to eat since Bradyadye Paredes, vomits everyday, is 7 weeks gestation       VITAL SIGNS   Initial Vitals [03/09/23 2002]   BP Pulse Resp Temp SpO2   136/87 99 15 99.2 °F (37.3 °C) 96 %      MAP       --            ALLERGIES    Review of patient's allergies indicates:  No Known Allergies    PROVIDER TRIAGE NOTE  28-year-old female approximately 7 weeks pregnant presents with abdominal cramping nausea and emesis.  Also reports diarrhea.  Having trouble keeping much of anything down.  Vital signs normal, no distress noted on exam.      ORDERS  Labs Reviewed   CBC W/ AUTO DIFFERENTIAL   COMPREHENSIVE METABOLIC PANEL   B-TYPE NATRIURETIC PEPTIDE   LIPASE   URINALYSIS, REFLEX TO URINE CULTURE       ED Orders (720h ago, onward)      Start Ordered     Status Ordering Provider    03/09/23 2015 03/09/23 2009  lactated ringers bolus 1,000 mL  ED 1 Time         Ordered PETERSON WALLACE    03/09/23 2015 03/09/23 2009  metoclopramide HCl injection 10 mg  ED 1 Time         Ordered PETERSON WALLACE    03/09/23 2010 03/09/23 2009  Saline lock IV  Once         Ordered PETERSON WALLACE    03/09/23 2010 03/09/23 2009  CBC Auto Differential  STAT         Ordered PETERSON WALLACE    03/09/23 2010 03/09/23 2009  Comprehensive Metabolic Panel  STAT         Ordered PETERSON WALLACE    03/09/23 2010 03/09/23 2009  BNP  STAT         Ordered PETERSON WALLACE    03/09/23 2010 03/09/23 2009  Lipase  STAT         Ordered PETERSON WALLACE    03/09/23 2010 03/09/23 2009  Urinalysis, Reflex to Urine Culture  STAT         Ordered PETERSON WALLACE              Virtual Visit Note: The provider triage portion of this emergency department evaluation and documentation was performed via [employer], a HIPAA-compliant telemedicine application,  in concert with a tele-presenter in the room. A face to face patient evaluation with one of my colleagues will occur once the patient is placed in an emergency department room.      DISCLAIMER: This note was prepared with [employer] voice recognition transcription software. Garbled syntax, mangled pronouns, and other bizarre constructions may be attributed to that software system.

## 2023-03-15 NOTE — PROGRESS NOTES
CC: Positive Pregnancy Test    HISTORY OF PRESENT ILLNESS:    Kika Johnson is a 28 y.o. female, ,  Presents today for a routine exam complaining of amenorrhea and positive home urine pregnancy test.  Patient's last menstrual period was 2023.   She is not currently on any contraception.  She reports light bleeding. She was seen in ED and given Rhogam at that time for Rh negative blood type. She can't eat and can't sleep. She reports she has not eaten food in 4-5 days. Reports nausea, for which she was prescribed Zofran. She is not taking any other medications. Denies past medical and surgical history. Denies family history of genetic abnormalities. This is her first pregnancy. She works as a  supervisor at [employer]. She is supposed to go on a cruise tomorrow through Monday to [place].         ROS:  GENERAL: No weight changes. No swelling. No fatigue. No fever.  CARDIOVASCULAR: No chest pain. No shortness of breath. No leg cramps.   NEUROLOGICAL: No headaches. No vision changes.  BREASTS: No pain. No lumps. No discharge.  ABDOMEN: No pain. No diarrhea. No constipation.  REPRODUCTIVE: No abnormal bleeding.   VULVA: No pain. No lesions. No itching.  VAGINA: No relaxation. No itching. No odor. No discharge. No lesions.  URINARY: No incontinence. No nocturia. No frequency. No dysuria.    MEDICATIONS AND ALLERGIES:  Reviewed        COMPREHENSIVE GYN HISTORY:  PAP History: Denies abnormal Paps.  Infection History: Denies STDs. Denies PID.  Benign History: Denies uterine fibroids. Denies ovarian cysts. Denies endometriosis. Denies other conditions.  Cancer History: Denies cervical cancer. Denies uterine cancer or hyperplasia. Denies ovarian cancer. Denies vulvar cancer or pre-cancer. Denies vaginal cancer or pre-cancer. Denies breast cancer. Denies colon cancer.  Sexual Activity History: Reports currently being sexually active  Menstrual History: None.  Contraception: None    LMP 2023     PE:  AFFECT:  Calm, alert and oriented X 3. Interactive during exam  GENERAL: Appears well-nourished, well-developed, in no acute distress.  HEAD: Normocephalic, atruamatic  TEETH: Good dentition.  BREASTS: No masses, skin changes, nipple discharge or adenopathy bilaterally.  SKIN: Normal for race, warm, & dry. No lesions or rashes.  ABDOMEN: Soft and nontender without masses or organomegally.  VULVA: No lesions, masses or tenderness.  VAGINA: Moist and well rugated without lesions or discharge.  CERVIX: Moist and pink without lesions, discharge or tenderness.      UTERUS SIZE: 6 week size, nontender and without masses.  ADNEXA: No masses or tenderness.  ESTIMATE OF PELVIC CAPACITY: Adequate  EXTREMITIES: No cyanosis, clubbing or edema. No calf tenderness.  LYMPH NODES: No axillary or inguinal adenopathy.    PROCEDURES:  UPT Positive  Genprobe  Pap/HPV co-testing      ASSESSMENT/PLAN:  Amenorrhea  Positive urine pregnancy test (EMERY: 10/21/23, EGA: 6w4d based on LMP)    -  Routine prenatal care    Nausea and vomiting in pregnancy    -  Education regarding lifestyle and dietary modifications  - Discussed options safe to take. Recommend sea bands if she does go on the cruise, but recommend staying in [place] due to feeling so bad, in the event she needs IV hydration.      1st TRIMESTER COUNSELING:   Common complaints of pregnancy  HIV and other routine prenatal tests including  genetic screening  Risk factors identified by prenatal history  Oriented to practice - discussed anticipated course of prenatal care & indications for Ultrasound  Childbirth classes/Hospital facilities   Nutrition and weight gain counseling  Toxoplasmosis precautions (Cats/Raw Meat)  Sexual activity and exercise  Environmental/Work hazards  Travel  Tobacco (Ask, Advise, Assess, Assist, and Arrange), as well as alcohol and drug use  Use of any medications (Including supplements, Vitamins, Herbs, or OTC Drugs)  Domestic violence  Seat belt  use      TERATOLOGY COUNSELING:   Discussed indications and options for aneuploidy screening - pamphlets given  Dating [place] next week.  FOLLOW-UP in 4 weeks with JOYCE Underwood

## 2023-03-27 ENCOUNTER — ROUTINE PRENATAL (OUTPATIENT)
Dept: OBSTETRICS AND GYNECOLOGY | Facility: CLINIC | Age: 28
End: 2023-03-27
Payer: COMMERCIAL

## 2023-03-27 VITALS — WEIGHT: 129 LBS | BODY MASS INDEX: 22.14 KG/M2 | SYSTOLIC BLOOD PRESSURE: 100 MMHG | DIASTOLIC BLOOD PRESSURE: 70 MMHG

## 2023-03-27 DIAGNOSIS — Z67.91 RH NEGATIVE STATE IN ANTEPARTUM PERIOD: ICD-10-CM

## 2023-03-27 DIAGNOSIS — Z13.79 GENETIC SCREENING: ICD-10-CM

## 2023-03-27 DIAGNOSIS — Z34.91 INITIAL OBSTETRIC VISIT IN FIRST TRIMESTER: Primary | ICD-10-CM

## 2023-03-27 DIAGNOSIS — Z3A.10 10 WEEKS GESTATION OF PREGNANCY: ICD-10-CM

## 2023-03-27 DIAGNOSIS — O26.899 RH NEGATIVE STATE IN ANTEPARTUM PERIOD: ICD-10-CM

## 2023-03-27 PROCEDURE — 99999 PR PBB SHADOW E&M-EST. PATIENT-LVL II: ICD-10-PCS | Mod: PBBFAC,,, | Performed by: OBSTETRICS & GYNECOLOGY

## 2023-03-27 PROCEDURE — 99999 PR PBB SHADOW E&M-EST. PATIENT-LVL II: CPT | Mod: PBBFAC,,, | Performed by: OBSTETRICS & GYNECOLOGY

## 2023-03-27 PROCEDURE — 0500F PR INITIAL PRENATAL CARE VISIT: ICD-10-PCS | Mod: CPTII,S$GLB,, | Performed by: OBSTETRICS & GYNECOLOGY

## 2023-03-27 PROCEDURE — 0500F INITIAL PRENATAL CARE VISIT: CPT | Mod: CPTII,S$GLB,, | Performed by: OBSTETRICS & GYNECOLOGY

## 2023-03-27 RX ORDER — PROMETHAZINE HYDROCHLORIDE 25 MG/1
25 TABLET ORAL EVERY 8 HOURS PRN
Qty: 30 TABLET | Refills: 6 | Status: ON HOLD | OUTPATIENT
Start: 2023-03-27 | End: 2023-10-02 | Stop reason: HOSPADM

## 2023-03-27 NOTE — PROGRESS NOTES
@10w5d: INOB visit today. Preg confirmation w/ Bo.  Doing well, denies vaginal bleeding or cramping. Did have VB in first trimester, received Rhogam.  1T labs reviewed, wnl. Recommend iron supplementation once no longer vomiting.    Lost 15 lbs since last visit.   Recommend Zofran alternating with Phenergan orally q 4 hours. B6 2-3 times a day and Unisom at night. Recommend Gatorade, Powerade, or Pedialyte daily along with good PO hydration. Only able to tolerate crackers at this time. Recommend BRAT diet until starting to feel better. Goal is <1 emesis per day. Eat frequent snacks q 2-3 hours as an empty stomach will cause vomiting the next time she eats.   Desires sequential screen, orders placed.  [facility] orders placed.  Discuss ASA 81 mg at next visit.    RTC 4 weeks OB f/u. SAB precautions reviewed.

## 2023-03-28 ENCOUNTER — PATIENT MESSAGE (OUTPATIENT)
Dept: ADMINISTRATIVE | Facility: OTHER | Age: 28
End: 2023-03-28
Payer: COMMERCIAL

## 2023-03-28 ENCOUNTER — PATIENT MESSAGE (OUTPATIENT)
Dept: MATERNAL FETAL MEDICINE | Facility: CLINIC | Age: 28
End: 2023-03-28
Payer: COMMERCIAL

## 2023-04-11 ENCOUNTER — PATIENT MESSAGE (OUTPATIENT)
Dept: MATERNAL FETAL MEDICINE | Facility: CLINIC | Age: 28
End: 2023-04-11
Payer: COMMERCIAL

## 2023-04-12 ENCOUNTER — PROCEDURE VISIT (OUTPATIENT)
Dept: MATERNAL FETAL MEDICINE | Facility: CLINIC | Age: 28
End: 2023-04-12
Payer: COMMERCIAL

## 2023-04-12 ENCOUNTER — LAB VISIT (OUTPATIENT)
Dept: LAB | Facility: OTHER | Age: 28
End: 2023-04-12
Attending: OBSTETRICS & GYNECOLOGY
Payer: COMMERCIAL

## 2023-04-12 DIAGNOSIS — Z34.91 INITIAL OBSTETRIC VISIT IN FIRST TRIMESTER: ICD-10-CM

## 2023-04-12 DIAGNOSIS — Z36.89 ENCOUNTER FOR FETAL ANATOMIC SURVEY: Primary | ICD-10-CM

## 2023-04-12 DIAGNOSIS — Z36.9 ENCOUNTER FOR ANTENATAL SCREENING: ICD-10-CM

## 2023-04-12 PROCEDURE — 76813 OB US NUCHAL MEAS 1 GEST: CPT | Mod: S$GLB,,, | Performed by: OBSTETRICS & GYNECOLOGY

## 2023-04-12 PROCEDURE — 84163 PAPPA SERUM: CPT | Performed by: OBSTETRICS & GYNECOLOGY

## 2023-04-12 PROCEDURE — 76813 US MFM PROCEDURE (VIEWPOINT): ICD-10-PCS | Mod: S$GLB,,, | Performed by: OBSTETRICS & GYNECOLOGY

## 2023-04-12 PROCEDURE — 36415 COLL VENOUS BLD VENIPUNCTURE: CPT | Performed by: OBSTETRICS & GYNECOLOGY

## 2023-04-13 DIAGNOSIS — Z34.01 ENCOUNTER FOR SUPERVISION OF NORMAL FIRST PREGNANCY IN FIRST TRIMESTER: Primary | ICD-10-CM

## 2023-04-13 NOTE — PROGRESS NOTES
Please schedule patient for labs at 9:30 am at [facility] (Sequential Screen Pt 2). The order has been placed, thanks!

## 2023-04-14 LAB — INTEGRATED SCN PATIENT-IMP NAR: NORMAL

## 2023-04-28 ENCOUNTER — LAB VISIT (OUTPATIENT)
Dept: LAB | Facility: OTHER | Age: 28
End: 2023-04-28
Attending: OBSTETRICS & GYNECOLOGY
Payer: COMMERCIAL

## 2023-04-28 ENCOUNTER — ROUTINE PRENATAL (OUTPATIENT)
Dept: OBSTETRICS AND GYNECOLOGY | Facility: CLINIC | Age: 28
End: 2023-04-28
Payer: COMMERCIAL

## 2023-04-28 VITALS — WEIGHT: 130.06 LBS | BODY MASS INDEX: 22.33 KG/M2 | DIASTOLIC BLOOD PRESSURE: 68 MMHG | SYSTOLIC BLOOD PRESSURE: 98 MMHG

## 2023-04-28 DIAGNOSIS — Z34.02 ENCOUNTER FOR SUPERVISION OF NORMAL FIRST PREGNANCY IN SECOND TRIMESTER: Primary | ICD-10-CM

## 2023-04-28 DIAGNOSIS — Z34.01 ENCOUNTER FOR SUPERVISION OF NORMAL FIRST PREGNANCY IN FIRST TRIMESTER: ICD-10-CM

## 2023-04-28 DIAGNOSIS — Z67.91 RH NEGATIVE STATE IN ANTEPARTUM PERIOD: ICD-10-CM

## 2023-04-28 DIAGNOSIS — O26.899 RH NEGATIVE STATE IN ANTEPARTUM PERIOD: ICD-10-CM

## 2023-04-28 DIAGNOSIS — Z3A.15 15 WEEKS GESTATION OF PREGNANCY: ICD-10-CM

## 2023-04-28 PROCEDURE — 0502F SUBSEQUENT PRENATAL CARE: CPT | Mod: CPTII,S$GLB,, | Performed by: OBSTETRICS & GYNECOLOGY

## 2023-04-28 PROCEDURE — 81511 FTL CGEN ABNOR FOUR ANAL: CPT | Performed by: OBSTETRICS & GYNECOLOGY

## 2023-04-28 PROCEDURE — 99999 PR PBB SHADOW E&M-EST. PATIENT-LVL II: ICD-10-PCS | Mod: PBBFAC,,, | Performed by: OBSTETRICS & GYNECOLOGY

## 2023-04-28 PROCEDURE — 99999 PR PBB SHADOW E&M-EST. PATIENT-LVL II: CPT | Mod: PBBFAC,,, | Performed by: OBSTETRICS & GYNECOLOGY

## 2023-04-28 PROCEDURE — 0502F PR SUBSEQUENT PRENATAL CARE: ICD-10-PCS | Mod: CPTII,S$GLB,, | Performed by: OBSTETRICS & GYNECOLOGY

## 2023-04-28 PROCEDURE — 36415 COLL VENOUS BLD VENIPUNCTURE: CPT | Performed by: OBSTETRICS & GYNECOLOGY

## 2023-04-28 RX ORDER — ONDANSETRON 4 MG/1
4 TABLET, ORALLY DISINTEGRATING ORAL EVERY 6 HOURS PRN
Qty: 30 TABLET | Refills: 3 | Status: SHIPPED | OUTPATIENT
Start: 2023-04-28 | End: 2023-08-10 | Stop reason: SDUPTHER

## 2023-04-28 RX ORDER — PNV 112/IRON/FOLIC/OM3/DHA/EPA 3.33-.33MG
1 TABLET,CHEWABLE ORAL 3 TIMES DAILY
Qty: 90 TABLET | Refills: 6 | Status: ON HOLD | OUTPATIENT
Start: 2023-04-28 | End: 2023-10-02 | Stop reason: HOSPADM

## 2023-04-28 RX ORDER — ASPIRIN 81 MG/1
81 TABLET ORAL DAILY
Qty: 60 TABLET | Refills: 3 | Status: ON HOLD | OUTPATIENT
Start: 2023-04-28 | End: 2023-10-02 | Stop reason: HOSPADM

## 2023-04-28 NOTE — PROGRESS NOTES
@15w2d: Doing well, denies vaginal bleeding or cramping. Not yet feeling FM.    Still with nausea but has gained a few lbs since last visit. Counseling and encouragement provided. Continue antiemetics as needed. Occasional cramping when dehydrated.   ASA 81 mg recommended, patient willing to take. Also recommend a prenatal vitamin with iron. Scripts sent to pharmacy but patient aware both are available over the counter.    Anatomy scan scheduled.  RTC 5 weeks OB f/u. SAB precautions reviewed.

## 2023-05-03 ENCOUNTER — PATIENT MESSAGE (OUTPATIENT)
Dept: OTHER | Facility: OTHER | Age: 28
End: 2023-05-03
Payer: COMMERCIAL

## 2023-05-03 LAB
# FETUSES US: NORMAL
AFP MOM SERPL: 1.24
AFP SERPL-MCNC: 49.9 NG/ML
AGE AT DELIVERY: 30
B-HCG MOM SERPL: 2.09
B-HCG SERPL-ACNC: 132.4 IU/ML
COLLECT DATE BLD: NORMAL
COLLECT DATE: NORMAL
FET TS 21 RISK FROM MAT AGE: NORMAL
GA (DAYS): 0 D
GA (WEEKS): 13 WK
GA METHOD: NORMAL
GEST. AGE (DAYS) 2ND SAMPLE (SI2): 2
GEST. AGE (WKS) 2ND SAMPLE (SI2): 15
IDDM PATIENT QL: NORMAL
INHIBIN A MOM SERPL: 1.43
INHIBIN A SERPL-MCNC: 242.7 PG/ML
INTEGRATED SCN PATIENT-IMP NAR: NORMAL
INTEGRATED SCN PATIENT-IMP: NEGATIVE
PAPP-A MOM SERPL: 1.21
PAPP-A SERPL-MCNC: NORMAL NG/ML
SMOKING STATUS FTND: NO
TS 18 RISK FETUS: NORMAL
TS 21 RISK FETUS: NORMAL
U ESTRIOL MOM SERPL: 0.8
U ESTRIOL SERPL-MCNC: 0.58 NG/ML

## 2023-05-10 ENCOUNTER — PATIENT MESSAGE (OUTPATIENT)
Dept: OTHER | Facility: OTHER | Age: 28
End: 2023-05-10
Payer: COMMERCIAL

## 2023-05-15 ENCOUNTER — HOSPITAL ENCOUNTER (EMERGENCY)
Facility: OTHER | Age: 28
Discharge: HOME | End: 2023-05-15
Attending: OBSTETRICS & GYNECOLOGY
Payer: COMMERCIAL

## 2023-05-15 VITALS
SYSTOLIC BLOOD PRESSURE: 118 MMHG | TEMPERATURE: 98 F | HEART RATE: 75 BPM | OXYGEN SATURATION: 100 % | DIASTOLIC BLOOD PRESSURE: 70 MMHG | RESPIRATION RATE: 18 BRPM

## 2023-05-15 DIAGNOSIS — O21.9 NAUSEA/VOMITING IN PREGNANCY: Primary | ICD-10-CM

## 2023-05-15 DIAGNOSIS — O26.892 PREGNANCY HEADACHE IN SECOND TRIMESTER: ICD-10-CM

## 2023-05-15 DIAGNOSIS — R51.9 PREGNANCY HEADACHE IN SECOND TRIMESTER: ICD-10-CM

## 2023-05-15 DIAGNOSIS — Z3A.17 17 WEEKS GESTATION OF PREGNANCY: ICD-10-CM

## 2023-05-15 LAB
ALBUMIN SERPL BCP-MCNC: 3.1 G/DL (ref 3.5–5.2)
ALP SERPL-CCNC: 38 U/L (ref 55–135)
ALT SERPL W/O P-5'-P-CCNC: 11 U/L (ref 10–44)
ANION GAP SERPL CALC-SCNC: 8 MMOL/L (ref 8–16)
AST SERPL-CCNC: 13 U/L (ref 10–40)
BASOPHILS # BLD AUTO: 0.02 K/UL (ref 0–0.2)
BASOPHILS NFR BLD: 0.2 % (ref 0–1.9)
BILIRUB SERPL-MCNC: 0.4 MG/DL (ref 0.1–1)
BILIRUB SERPL-MCNC: NORMAL MG/DL
BLOOD URINE, POC: NORMAL
BUN SERPL-MCNC: 5 MG/DL (ref 6–20)
CALCIUM SERPL-MCNC: 8.7 MG/DL (ref 8.7–10.5)
CHLORIDE SERPL-SCNC: 107 MMOL/L (ref 95–110)
CO2 SERPL-SCNC: 22 MMOL/L (ref 23–29)
COLOR, POC UA: NORMAL
CREAT SERPL-MCNC: 0.6 MG/DL (ref 0.5–1.4)
DIFFERENTIAL METHOD: ABNORMAL
EOSINOPHIL # BLD AUTO: 0.2 K/UL (ref 0–0.5)
EOSINOPHIL NFR BLD: 1.3 % (ref 0–8)
ERYTHROCYTE [DISTWIDTH] IN BLOOD BY AUTOMATED COUNT: 12.8 % (ref 11.5–14.5)
EST. GFR  (NO RACE VARIABLE): >60 ML/MIN/1.73 M^2
GLUCOSE SERPL-MCNC: 75 MG/DL (ref 70–110)
GLUCOSE UR QL STRIP: NORMAL
HCT VFR BLD AUTO: 30.7 % (ref 37–48.5)
HGB BLD-MCNC: 10.7 G/DL (ref 12–16)
IMM GRANULOCYTES # BLD AUTO: 0.03 K/UL (ref 0–0.04)
IMM GRANULOCYTES NFR BLD AUTO: 0.3 % (ref 0–0.5)
INFLUENZA A, MOLECULAR: NEGATIVE
INFLUENZA B, MOLECULAR: NEGATIVE
KETONES UR QL STRIP: NORMAL
LEUKOCYTE ESTERASE URINE, POC: NORMAL
LYMPHOCYTES # BLD AUTO: 1.7 K/UL (ref 1–4.8)
LYMPHOCYTES NFR BLD: 14.6 % (ref 18–48)
MAGNESIUM SERPL-MCNC: 1.6 MG/DL (ref 1.6–2.6)
MCH RBC QN AUTO: 30 PG (ref 27–31)
MCHC RBC AUTO-ENTMCNC: 34.9 G/DL (ref 32–36)
MCV RBC AUTO: 86 FL (ref 82–98)
MONOCYTES # BLD AUTO: 0.6 K/UL (ref 0.3–1)
MONOCYTES NFR BLD: 5.1 % (ref 4–15)
NEUTROPHILS # BLD AUTO: 9.3 K/UL (ref 1.8–7.7)
NEUTROPHILS NFR BLD: 78.5 % (ref 38–73)
NITRITE, POC UA: NORMAL
NRBC BLD-RTO: 0 /100 WBC
PH, POC UA: NORMAL
PHOSPHATE SERPL-MCNC: 3.4 MG/DL (ref 2.7–4.5)
PLATELET # BLD AUTO: 248 K/UL (ref 150–450)
PMV BLD AUTO: 10.1 FL (ref 9.2–12.9)
POTASSIUM SERPL-SCNC: 3.3 MMOL/L (ref 3.5–5.1)
PROT SERPL-MCNC: 6.1 G/DL (ref 6–8.4)
PROTEIN, POC: NORMAL
RBC # BLD AUTO: 3.57 M/UL (ref 4–5.4)
SARS-COV-2 RDRP RESP QL NAA+PROBE: NEGATIVE
SODIUM SERPL-SCNC: 137 MMOL/L (ref 136–145)
SPECIFIC GRAVITY, POC UA: NORMAL
SPECIMEN SOURCE: NORMAL
UROBILINOGEN, POC UA: NORMAL
WBC # BLD AUTO: 11.79 K/UL (ref 3.9–12.7)

## 2023-05-15 PROCEDURE — 85025 COMPLETE CBC W/AUTO DIFF WBC: CPT

## 2023-05-15 PROCEDURE — 96374 THER/PROPH/DIAG INJ IV PUSH: CPT

## 2023-05-15 PROCEDURE — 99285 PR EMERGENCY DEPT VISIT,LEVEL V: ICD-10-PCS | Mod: ,,, | Performed by: OBSTETRICS & GYNECOLOGY

## 2023-05-15 PROCEDURE — 83735 ASSAY OF MAGNESIUM: CPT

## 2023-05-15 PROCEDURE — 87502 INFLUENZA DNA AMP PROBE: CPT

## 2023-05-15 PROCEDURE — U0002 COVID-19 LAB TEST NON-CDC: HCPCS

## 2023-05-15 PROCEDURE — 25000003 PHARM REV CODE 250

## 2023-05-15 PROCEDURE — 99284 EMERGENCY DEPT VISIT MOD MDM: CPT | Mod: 25

## 2023-05-15 PROCEDURE — 84100 ASSAY OF PHOSPHORUS: CPT

## 2023-05-15 PROCEDURE — 80053 COMPREHEN METABOLIC PANEL: CPT

## 2023-05-15 PROCEDURE — 99285 EMERGENCY DEPT VISIT HI MDM: CPT | Mod: ,,, | Performed by: OBSTETRICS & GYNECOLOGY

## 2023-05-15 PROCEDURE — 63600175 PHARM REV CODE 636 W HCPCS

## 2023-05-15 PROCEDURE — 63600175 PHARM REV CODE 636 W HCPCS: Performed by: OBSTETRICS & GYNECOLOGY

## 2023-05-15 RX ORDER — POTASSIUM CHLORIDE 20 MEQ/1
20 TABLET, EXTENDED RELEASE ORAL ONCE
Status: COMPLETED | OUTPATIENT
Start: 2023-05-15 | End: 2023-05-15

## 2023-05-15 RX ORDER — POTASSIUM CHLORIDE 20 MEQ/1
20 TABLET, EXTENDED RELEASE ORAL ONCE
Qty: 1 TABLET | Refills: 0 | Status: SHIPPED | OUTPATIENT
Start: 2023-05-16 | End: 2023-05-16

## 2023-05-15 RX ORDER — ONDANSETRON 2 MG/ML
4 INJECTION INTRAMUSCULAR; INTRAVENOUS ONCE
Status: COMPLETED | OUTPATIENT
Start: 2023-05-15 | End: 2023-05-15

## 2023-05-15 RX ORDER — ACETAMINOPHEN 500 MG
1000 TABLET ORAL ONCE
Status: COMPLETED | OUTPATIENT
Start: 2023-05-15 | End: 2023-05-15

## 2023-05-15 RX ORDER — ONDANSETRON 2 MG/ML
8 INJECTION INTRAMUSCULAR; INTRAVENOUS ONCE
Status: DISCONTINUED | OUTPATIENT
Start: 2023-05-15 | End: 2023-05-15

## 2023-05-15 RX ORDER — POTASSIUM CHLORIDE 7.45 MG/ML
10 INJECTION INTRAVENOUS
Status: DISCONTINUED | OUTPATIENT
Start: 2023-05-15 | End: 2023-05-15

## 2023-05-15 RX ADMIN — ONDANSETRON 4 MG: 2 INJECTION INTRAMUSCULAR; INTRAVENOUS at 07:05

## 2023-05-15 RX ADMIN — ACETAMINOPHEN 1000 MG: 500 TABLET, FILM COATED ORAL at 09:05

## 2023-05-15 RX ADMIN — POTASSIUM CHLORIDE 20 MEQ: 1500 TABLET, EXTENDED RELEASE ORAL at 09:05

## 2023-05-15 RX ADMIN — SODIUM CHLORIDE, SODIUM LACTATE, POTASSIUM CHLORIDE, CALCIUM CHLORIDE AND DEXTROSE MONOHYDRATE 1000 ML: 5; 600; 310; 30; 20 INJECTION, SOLUTION INTRAVENOUS at 07:05

## 2023-05-15 NOTE — ED PROVIDER NOTES
Bone marrow is normal. Slightly abnormal blood counts are reactive. No follow up is needed. Encounter Date: 5/15/2023       History     Chief Complaint   Patient presents with    Dizziness    Nausea    Vomiting    Weakness    Abdominal Pain    Headache     Kika Johnson is a 28 y.o. F at 17w5d presents complaining of nausea, vomiting, dizziness and headache. Patient reports history of nausea and vomiting in pregnancy. She took zofran this morning without relief. She has vomited 4 times today and has not been able to tolerate any po intake today. She reports abdominal pain when she vomits. She reports feeling weak and lightheaded. She has had subjective fevers are home. She also reports a headache that started today. Patient denies contractions, denies vaginal bleeding, denies LOF.   Fetal Movement: not yet felt    This IUP is complicated by Rh negative, and HEG.      Review of patient's allergies indicates:  No Known Allergies  No past medical history on file.  No past surgical history on file.  Family History   Problem Relation Age of Onset    Breast cancer Maternal Grandmother     Stomach cancer Father     Colon cancer Neg Hx     Ovarian cancer Neg Hx      Social History     Tobacco Use    Smoking status: Never    Smokeless tobacco: Never   Substance Use Topics    Alcohol use: Not Currently    Drug use: Not Currently     Types: Marijuana     Review of Systems   Constitutional:  Positive for fever. Negative for chills.   HENT:  Negative for congestion.    Eyes:  Negative for visual disturbance.   Respiratory:  Negative for cough and shortness of breath.    Cardiovascular:  Negative for chest pain.   Gastrointestinal:  Positive for abdominal pain (with vomiting), nausea and vomiting. Negative for constipation and diarrhea.   Genitourinary:  Negative for dysuria, vaginal bleeding and vaginal discharge.   Skin:  Negative for rash.   Neurological:  Positive for dizziness and light-headedness.     Physical Exam     Initial Vitals   BP Pulse Resp Temp SpO2   05/15/23 1829 05/15/23 1828 05/15/23 1829  05/15/23 1829 05/15/23 1828   118/70 78 18 98.4 °F (36.9 °C) 100 %      MAP       --                Physical Exam    Constitutional: She appears well-developed and well-nourished.   HENT:   Head: Normocephalic and atraumatic.   Pulmonary/Chest: No respiratory distress.   Abdominal: Abdomen is soft. She exhibits no distension. There is no abdominal tenderness. There is no rebound and no guarding.   Musculoskeletal:         General: Normal range of motion.     Neurological: She is alert and oriented to person, place, and time.   Skin: Skin is warm and dry.   Psychiatric: She has a normal mood and affect.       ED Course   Procedures  Labs Reviewed   CBC W/ AUTO DIFFERENTIAL - Abnormal; Notable for the following components:       Result Value    RBC 3.57 (*)     Hemoglobin 10.7 (*)     Hematocrit 30.7 (*)     Gran # (ANC) 9.3 (*)     Gran % 78.5 (*)     Lymph % 14.6 (*)     All other components within normal limits   COMPREHENSIVE METABOLIC PANEL - Abnormal; Notable for the following components:    Potassium 3.3 (*)     CO2 22 (*)     BUN 5 (*)     Albumin 3.1 (*)     Alkaline Phosphatase 38 (*)     All other components within normal limits   INFLUENZA A & B BY MOLECULAR   MAGNESIUM   PHOSPHORUS   SARS-COV-2 RNA AMPLIFICATION, QUAL   POCT URINALYSIS, DIPSTICK OR TABLET REAGENT, AUTOMATED, WITH MICROSCOP          Imaging Results    None          Medications   acetaminophen tablet 1,000 mg (has no administration in time range)   dextrose 5% lactated ringers bolus 1,000 mL (1,000 mLs Intravenous New Bag 5/15/23 1938)   ondansetron injection 4 mg (4 mg Intravenous Given 5/15/23 1943)   potassium chloride SA CR tablet 20 mEq (20 mEq Oral Given 5/15/23 2106)     Medical Decision Making:   ED Management:   28 y.o. F at 17w5d presents complaining of nausea, vomiting, lightheadedness, headache and decreased po intake.   VSS  FHT verified 140s by nursing staff  Udip: 2+ ketones  COVID: negative  Flu: negative  CBC, Mag, P  WNL   CMP: notable for K 3.3, given 20 Klorcon   Given IV zofran, 1L D5LR bolus with improvement in symptoms  Po challenge passed  Patient reported significant improvement in headache with fluids alone but desired tylenol prior to discharge. 1g tylenol given   Rx sent for additional dose of 20 Klorcon  Patient instructed to take zofran q 6 for next several days and then as needed.  Patient deemed stable for discharge. Strict return precautions given  Other:   I have discussed this case with another health care provider.       <> Summary of the Discussion: Case discussed with [facility] staff who are in agreement with assessment and plan.           Attending Attestation:   Physician Attestation Statement for Resident:  As the supervising MD   Physician Attestation Statement: I have personally seen and examined this patient.   I agree with the above history.  -:   As the supervising MD I agree with the above PE.     As the supervising MD I agree with the above treatment, course, plan, and disposition.   I was personally present during the critical portions of the procedure(s) performed by the resident and was immediately available in the ED to provide services and assistance as needed during the entire procedure.                             Clinical Impression:   Final diagnoses:  [O21.9] Nausea/vomiting in pregnancy (Primary)  [Z3A.17] 17 weeks gestation of pregnancy  [O26.892, R51.9] Pregnancy headache in second trimester        ED Disposition Condition    Discharge Stable          ED Prescriptions       Medication Sig Dispense Start Date End Date Auth. Provider    potassium chloride SA (K-DUR,KLOR-CON) 20 MEQ tablet Take 1 tablet (20 mEq total) by mouth once. for 1 dose 1 tablet 5/16/2023 5/16/2023 Gayathri Bowman MD          Follow-up Information    None          Gayathri Bowman MD  Resident  05/15/23 8306       Ellen Freeman MD  05/21/23 0969

## 2023-05-24 ENCOUNTER — PATIENT MESSAGE (OUTPATIENT)
Dept: MATERNAL FETAL MEDICINE | Facility: CLINIC | Age: 28
End: 2023-05-24
Payer: COMMERCIAL

## 2023-05-25 ENCOUNTER — PATIENT MESSAGE (OUTPATIENT)
Dept: MATERNAL FETAL MEDICINE | Facility: CLINIC | Age: 28
End: 2023-05-25

## 2023-05-25 ENCOUNTER — PROCEDURE VISIT (OUTPATIENT)
Dept: MATERNAL FETAL MEDICINE | Facility: CLINIC | Age: 28
End: 2023-05-25
Payer: COMMERCIAL

## 2023-05-25 DIAGNOSIS — Z36.2 ENCOUNTER FOR FOLLOW-UP ULTRASOUND OF FETAL ANATOMY: Primary | ICD-10-CM

## 2023-05-25 DIAGNOSIS — Z36.89 ENCOUNTER FOR FETAL ANATOMIC SURVEY: ICD-10-CM

## 2023-05-25 PROCEDURE — 76805 US MFM PROCEDURE (VIEWPOINT): ICD-10-PCS | Mod: S$GLB,,, | Performed by: OBSTETRICS & GYNECOLOGY

## 2023-05-25 PROCEDURE — 76805 OB US >/= 14 WKS SNGL FETUS: CPT | Mod: S$GLB,,, | Performed by: OBSTETRICS & GYNECOLOGY

## 2023-05-31 ENCOUNTER — PATIENT MESSAGE (OUTPATIENT)
Dept: OTHER | Facility: OTHER | Age: 28
End: 2023-05-31
Payer: COMMERCIAL

## 2023-06-05 ENCOUNTER — ROUTINE PRENATAL (OUTPATIENT)
Dept: OBSTETRICS AND GYNECOLOGY | Facility: CLINIC | Age: 28
End: 2023-06-05
Payer: COMMERCIAL

## 2023-06-05 VITALS
BODY MASS INDEX: 22.89 KG/M2 | SYSTOLIC BLOOD PRESSURE: 116 MMHG | DIASTOLIC BLOOD PRESSURE: 78 MMHG | WEIGHT: 133.38 LBS

## 2023-06-05 DIAGNOSIS — N76.0 BACTERIAL VAGINOSIS: ICD-10-CM

## 2023-06-05 DIAGNOSIS — B96.89 BACTERIAL VAGINOSIS: ICD-10-CM

## 2023-06-05 DIAGNOSIS — Z67.91 RH NEGATIVE STATE IN ANTEPARTUM PERIOD: ICD-10-CM

## 2023-06-05 DIAGNOSIS — Z34.02 ENCOUNTER FOR SUPERVISION OF NORMAL FIRST PREGNANCY IN SECOND TRIMESTER: Primary | ICD-10-CM

## 2023-06-05 DIAGNOSIS — O26.899 RH NEGATIVE STATE IN ANTEPARTUM PERIOD: ICD-10-CM

## 2023-06-05 DIAGNOSIS — O21.9 NAUSEA/VOMITING IN PREGNANCY: ICD-10-CM

## 2023-06-05 DIAGNOSIS — Z3A.20 20 WEEKS GESTATION OF PREGNANCY: ICD-10-CM

## 2023-06-05 PROCEDURE — 0502F PR SUBSEQUENT PRENATAL CARE: ICD-10-PCS | Mod: CPTII,S$GLB,, | Performed by: OBSTETRICS & GYNECOLOGY

## 2023-06-05 PROCEDURE — 99999 PR PBB SHADOW E&M-EST. PATIENT-LVL III: CPT | Mod: PBBFAC,,, | Performed by: OBSTETRICS & GYNECOLOGY

## 2023-06-05 PROCEDURE — 0502F SUBSEQUENT PRENATAL CARE: CPT | Mod: CPTII,S$GLB,, | Performed by: OBSTETRICS & GYNECOLOGY

## 2023-06-05 PROCEDURE — 99999 PR PBB SHADOW E&M-EST. PATIENT-LVL III: ICD-10-PCS | Mod: PBBFAC,,, | Performed by: OBSTETRICS & GYNECOLOGY

## 2023-06-05 RX ORDER — METRONIDAZOLE 500 MG/1
500 TABLET ORAL EVERY 12 HOURS
Qty: 14 TABLET | Refills: 0 | Status: SHIPPED | OUTPATIENT
Start: 2023-06-05 | End: 2023-06-12

## 2023-06-05 RX ORDER — METOCLOPRAMIDE 10 MG/1
10 TABLET ORAL EVERY 8 HOURS PRN
Qty: 60 TABLET | Refills: 2 | Status: SHIPPED | OUTPATIENT
Start: 2023-06-05 | End: 2023-07-03

## 2023-06-05 NOTE — PROGRESS NOTES
@20w5d: Doing well, denies vaginal bleeding or cramping. Starting to feel FM. Still with nausea and vomiting. Rx for Reglan sent to pharmacy to alternate with Zofran q4h. Recommend aggressive hydration, small frequent meals, etc.   Glucose test and CBC next visit.    Follow up anatomy scheduled.  Pelzer gender.   Needs Rhogam at 28 weeks.   RTC 4 weeks OB f/u. SAB precautions reviewed.

## 2023-06-28 ENCOUNTER — PATIENT MESSAGE (OUTPATIENT)
Dept: OTHER | Facility: OTHER | Age: 28
End: 2023-06-28
Payer: COMMERCIAL

## 2023-06-28 ENCOUNTER — PATIENT MESSAGE (OUTPATIENT)
Dept: MATERNAL FETAL MEDICINE | Facility: CLINIC | Age: 28
End: 2023-06-28
Payer: COMMERCIAL

## 2023-06-29 ENCOUNTER — PROCEDURE VISIT (OUTPATIENT)
Dept: MATERNAL FETAL MEDICINE | Facility: CLINIC | Age: 28
End: 2023-06-29
Payer: COMMERCIAL

## 2023-06-29 DIAGNOSIS — Z36.2 ENCOUNTER FOR FOLLOW-UP ULTRASOUND OF FETAL ANATOMY: ICD-10-CM

## 2023-06-29 PROCEDURE — 76816 OB US FOLLOW-UP PER FETUS: CPT | Mod: S$GLB,,, | Performed by: OBSTETRICS & GYNECOLOGY

## 2023-06-29 PROCEDURE — 76816 US MFM PROCEDURE (VIEWPOINT): ICD-10-PCS | Mod: S$GLB,,, | Performed by: OBSTETRICS & GYNECOLOGY

## 2023-07-03 ENCOUNTER — ROUTINE PRENATAL (OUTPATIENT)
Dept: OBSTETRICS AND GYNECOLOGY | Facility: CLINIC | Age: 28
End: 2023-07-03
Payer: COMMERCIAL

## 2023-07-03 VITALS
WEIGHT: 139.13 LBS | SYSTOLIC BLOOD PRESSURE: 112 MMHG | BODY MASS INDEX: 23.88 KG/M2 | DIASTOLIC BLOOD PRESSURE: 68 MMHG

## 2023-07-03 DIAGNOSIS — O26.893 RH NEGATIVE STATE IN ANTEPARTUM PERIOD, THIRD TRIMESTER: ICD-10-CM

## 2023-07-03 DIAGNOSIS — K59.00 CONSTIPATION, UNSPECIFIED CONSTIPATION TYPE: ICD-10-CM

## 2023-07-03 DIAGNOSIS — Z67.91 RH NEGATIVE STATE IN ANTEPARTUM PERIOD, THIRD TRIMESTER: ICD-10-CM

## 2023-07-03 DIAGNOSIS — Z34.02 ENCOUNTER FOR SUPERVISION OF NORMAL FIRST PREGNANCY IN SECOND TRIMESTER: Primary | ICD-10-CM

## 2023-07-03 DIAGNOSIS — B37.31 YEAST VAGINITIS: ICD-10-CM

## 2023-07-03 DIAGNOSIS — Z3A.24 24 WEEKS GESTATION OF PREGNANCY: ICD-10-CM

## 2023-07-03 PROCEDURE — 0502F SUBSEQUENT PRENATAL CARE: CPT | Mod: CPTII,S$GLB,, | Performed by: OBSTETRICS & GYNECOLOGY

## 2023-07-03 PROCEDURE — 0502F PR SUBSEQUENT PRENATAL CARE: ICD-10-PCS | Mod: CPTII,S$GLB,, | Performed by: OBSTETRICS & GYNECOLOGY

## 2023-07-03 PROCEDURE — 99999 PR PBB SHADOW E&M-EST. PATIENT-LVL III: ICD-10-PCS | Mod: PBBFAC,,, | Performed by: OBSTETRICS & GYNECOLOGY

## 2023-07-03 PROCEDURE — 99999 PR PBB SHADOW E&M-EST. PATIENT-LVL III: CPT | Mod: PBBFAC,,, | Performed by: OBSTETRICS & GYNECOLOGY

## 2023-07-03 RX ORDER — DOCUSATE SODIUM 100 MG/1
100 CAPSULE, LIQUID FILLED ORAL 2 TIMES DAILY
Qty: 60 CAPSULE | Refills: 3 | Status: ON HOLD | OUTPATIENT
Start: 2023-07-03 | End: 2023-10-02 | Stop reason: HOSPADM

## 2023-07-03 NOTE — PROGRESS NOTES
@24w5d: Doing well, denies CTX, LOF, VB. +FM.   Took Reglan for nausea, made her neck stiff so stopped using it.   Glucose test has to be rescheduled, patient did not make it back downstairs in time.   Constipation and significant straining. Recommend stool softener and Miralax. Counseled that a side effect of Zofran (which she takes daily for nausea) is constipation, and to use it sparingly and be sure to hydrate.    Gender reveal at the end of the month (wants a boy).   Needs Rhogam at 28 weeks.   Discuss TDap, pediatrician, and breast feeding next visit.   RTC 4 weeks OB f/u. PTL/PPROM/PreE/FM precautions reviewed.

## 2023-07-05 ENCOUNTER — TELEPHONE (OUTPATIENT)
Dept: OBSTETRICS AND GYNECOLOGY | Facility: CLINIC | Age: 28
End: 2023-07-05
Payer: COMMERCIAL

## 2023-07-05 ENCOUNTER — LAB VISIT (OUTPATIENT)
Dept: LAB | Facility: OTHER | Age: 28
End: 2023-07-05
Attending: OBSTETRICS & GYNECOLOGY
Payer: COMMERCIAL

## 2023-07-05 DIAGNOSIS — O99.810 ABNORMAL GLUCOSE TOLERANCE IN PREGNANCY: Primary | ICD-10-CM

## 2023-07-05 DIAGNOSIS — Z34.02 ENCOUNTER FOR SUPERVISION OF NORMAL FIRST PREGNANCY IN SECOND TRIMESTER: ICD-10-CM

## 2023-07-05 LAB
BASOPHILS # BLD AUTO: 0.03 K/UL (ref 0–0.2)
BASOPHILS NFR BLD: 0.4 % (ref 0–1.9)
DIFFERENTIAL METHOD: ABNORMAL
EOSINOPHIL # BLD AUTO: 0.2 K/UL (ref 0–0.5)
EOSINOPHIL NFR BLD: 1.9 % (ref 0–8)
ERYTHROCYTE [DISTWIDTH] IN BLOOD BY AUTOMATED COUNT: 13.3 % (ref 11.5–14.5)
GLUCOSE SERPL-MCNC: 141 MG/DL (ref 70–140)
HCT VFR BLD AUTO: 28.4 % (ref 37–48.5)
HGB BLD-MCNC: 9.6 G/DL (ref 12–16)
IMM GRANULOCYTES # BLD AUTO: 0.03 K/UL (ref 0–0.04)
IMM GRANULOCYTES NFR BLD AUTO: 0.4 % (ref 0–0.5)
LYMPHOCYTES # BLD AUTO: 1.4 K/UL (ref 1–4.8)
LYMPHOCYTES NFR BLD: 17.3 % (ref 18–48)
MCH RBC QN AUTO: 30 PG (ref 27–31)
MCHC RBC AUTO-ENTMCNC: 33.8 G/DL (ref 32–36)
MCV RBC AUTO: 89 FL (ref 82–98)
MONOCYTES # BLD AUTO: 0.5 K/UL (ref 0.3–1)
MONOCYTES NFR BLD: 6.2 % (ref 4–15)
NEUTROPHILS # BLD AUTO: 6.1 K/UL (ref 1.8–7.7)
NEUTROPHILS NFR BLD: 73.8 % (ref 38–73)
NRBC BLD-RTO: 0 /100 WBC
PLATELET # BLD AUTO: 194 K/UL (ref 150–450)
PMV BLD AUTO: 11.2 FL (ref 9.2–12.9)
RBC # BLD AUTO: 3.2 M/UL (ref 4–5.4)
WBC # BLD AUTO: 8.26 K/UL (ref 3.9–12.7)

## 2023-07-05 PROCEDURE — 36415 COLL VENOUS BLD VENIPUNCTURE: CPT | Performed by: OBSTETRICS & GYNECOLOGY

## 2023-07-05 PROCEDURE — 85025 COMPLETE CBC W/AUTO DIFF WBC: CPT | Performed by: OBSTETRICS & GYNECOLOGY

## 2023-07-05 PROCEDURE — 82950 GLUCOSE TEST: CPT | Performed by: OBSTETRICS & GYNECOLOGY

## 2023-07-05 NOTE — TELEPHONE ENCOUNTER
----- Message from Vanesa Allen MD sent at 7/5/2023  2:30 PM CDT -----  Please call patient and schedule 3 hour glucose test. Remind her she should be fasting for this test and we should try to get it done as soon as possible. Thanks!

## 2023-07-06 ENCOUNTER — TELEPHONE (OUTPATIENT)
Dept: OBSTETRICS AND GYNECOLOGY | Facility: CLINIC | Age: 28
End: 2023-07-06
Payer: COMMERCIAL

## 2023-07-06 DIAGNOSIS — B37.31 YEAST VAGINITIS: Primary | ICD-10-CM

## 2023-07-06 RX ORDER — NYSTATIN AND TRIAMCINOLONE ACETONIDE 100000; 1 [USP'U]/G; MG/G
CREAM TOPICAL
Qty: 30 G | Refills: 1 | Status: ON HOLD | OUTPATIENT
Start: 2023-07-06 | End: 2023-10-02 | Stop reason: HOSPADM

## 2023-07-06 NOTE — TELEPHONE ENCOUNTER
----- Message from Wendy Somers LPN sent at 7/6/2023  1:02 PM CDT -----  Cream for vaginal itching/ irritation needed  ----- Message -----  From: Violeta Yeung  Sent: 7/6/2023  10:21 AM CDT  To: [facility] Staff    Pt stated when she came in for her appt on 7/3 a prescription for yeast infection was supposed to be called in. She states it has not been done yet. Pt# [phone].

## 2023-07-06 NOTE — TELEPHONE ENCOUNTER
Rx for Nystatin-Triamcinolone sent to patient's pharmacy per request. If no improvement, consider Affirm swab.

## 2023-07-06 NOTE — TELEPHONE ENCOUNTER
Spoke with the patient regarding 3 hour glucose test and confirmed scheduled for Monday. Patient stated having vaginal irritation, Dr holguin will send in a cream.

## 2023-07-10 ENCOUNTER — LAB VISIT (OUTPATIENT)
Dept: LAB | Facility: OTHER | Age: 28
End: 2023-07-10
Attending: OBSTETRICS & GYNECOLOGY
Payer: COMMERCIAL

## 2023-07-10 DIAGNOSIS — O99.810 ABNORMAL GLUCOSE TOLERANCE IN PREGNANCY: ICD-10-CM

## 2023-07-10 LAB
GLUCOSE SERPL-MCNC: 107 MG/DL
GLUCOSE SERPL-MCNC: 126 MG/DL
GLUCOSE SERPL-MCNC: 77 MG/DL (ref 70–110)
GLUCOSE SERPL-MCNC: 80 MG/DL

## 2023-07-10 PROCEDURE — 36415 COLL VENOUS BLD VENIPUNCTURE: CPT | Performed by: OBSTETRICS & GYNECOLOGY

## 2023-07-10 PROCEDURE — 82951 GLUCOSE TOLERANCE TEST (GTT): CPT | Performed by: OBSTETRICS & GYNECOLOGY

## 2023-07-12 ENCOUNTER — PATIENT MESSAGE (OUTPATIENT)
Dept: OTHER | Facility: OTHER | Age: 28
End: 2023-07-12
Payer: COMMERCIAL

## 2023-07-14 ENCOUNTER — TELEPHONE (OUTPATIENT)
Dept: OBSTETRICS AND GYNECOLOGY | Facility: CLINIC | Age: 28
End: 2023-07-14
Payer: COMMERCIAL

## 2023-07-14 NOTE — TELEPHONE ENCOUNTER
"----- Message from Vanesa Allen MD sent at 7/14/2023  8:48 AM CDT -----  Regarding: Needs Letter  Please send patient a letter via the portal stating:     "This patient has been under my care for her pregnancy since 3/27/23. She has an estimated due date of 10/18/23. Please reach out to the office if there are additional questions or concerns."    Please notify the patient via the portal that the letter has been sent! Thanks!        "

## 2023-07-26 ENCOUNTER — PATIENT MESSAGE (OUTPATIENT)
Dept: OTHER | Facility: OTHER | Age: 28
End: 2023-07-26
Payer: COMMERCIAL

## 2023-07-28 ENCOUNTER — ROUTINE PRENATAL (OUTPATIENT)
Dept: OBSTETRICS AND GYNECOLOGY | Facility: CLINIC | Age: 28
End: 2023-07-28
Payer: COMMERCIAL

## 2023-07-28 ENCOUNTER — CLINICAL SUPPORT (OUTPATIENT)
Dept: OBSTETRICS AND GYNECOLOGY | Facility: CLINIC | Age: 28
End: 2023-07-28
Payer: COMMERCIAL

## 2023-07-28 VITALS
SYSTOLIC BLOOD PRESSURE: 118 MMHG | DIASTOLIC BLOOD PRESSURE: 80 MMHG | BODY MASS INDEX: 24.14 KG/M2 | WEIGHT: 140.63 LBS

## 2023-07-28 DIAGNOSIS — Z67.91 RH NEGATIVE STATE IN ANTEPARTUM PERIOD, THIRD TRIMESTER: Primary | ICD-10-CM

## 2023-07-28 DIAGNOSIS — Z67.91 RH NEGATIVE STATE IN ANTEPARTUM PERIOD, THIRD TRIMESTER: ICD-10-CM

## 2023-07-28 DIAGNOSIS — Z34.03 ENCOUNTER FOR SUPERVISION OF NORMAL FIRST PREGNANCY IN THIRD TRIMESTER: Primary | ICD-10-CM

## 2023-07-28 DIAGNOSIS — O26.893 RH NEGATIVE STATE IN ANTEPARTUM PERIOD, THIRD TRIMESTER: Primary | ICD-10-CM

## 2023-07-28 DIAGNOSIS — Z3A.28 28 WEEKS GESTATION OF PREGNANCY: ICD-10-CM

## 2023-07-28 DIAGNOSIS — Z23 NEED FOR TDAP VACCINATION: ICD-10-CM

## 2023-07-28 DIAGNOSIS — O26.893 RH NEGATIVE STATE IN ANTEPARTUM PERIOD, THIRD TRIMESTER: ICD-10-CM

## 2023-07-28 PROCEDURE — 90471 TDAP VACCINE GREATER THAN OR EQUAL TO 7YO IM: ICD-10-PCS | Mod: 59,S$GLB,, | Performed by: OBSTETRICS & GYNECOLOGY

## 2023-07-28 PROCEDURE — 96372 RHO (D) IMMUNE GLOBULIN: ICD-10-PCS | Mod: S$GLB,,, | Performed by: OBSTETRICS & GYNECOLOGY

## 2023-07-28 PROCEDURE — 90471 IMMUNIZATION ADMIN: CPT | Mod: 59,S$GLB,, | Performed by: OBSTETRICS & GYNECOLOGY

## 2023-07-28 PROCEDURE — 99999 PR PBB SHADOW E&M-EST. PATIENT-LVL I: ICD-10-PCS | Mod: PBBFAC,,,

## 2023-07-28 PROCEDURE — 96372 THER/PROPH/DIAG INJ SC/IM: CPT | Mod: S$GLB,,, | Performed by: OBSTETRICS & GYNECOLOGY

## 2023-07-28 PROCEDURE — 99999 PR PBB SHADOW E&M-EST. PATIENT-LVL I: CPT | Mod: PBBFAC,,,

## 2023-07-28 PROCEDURE — 90715 TDAP VACCINE GREATER THAN OR EQUAL TO 7YO IM: ICD-10-PCS | Mod: S$GLB,,, | Performed by: OBSTETRICS & GYNECOLOGY

## 2023-07-28 PROCEDURE — 90715 TDAP VACCINE 7 YRS/> IM: CPT | Mod: S$GLB,,, | Performed by: OBSTETRICS & GYNECOLOGY

## 2023-07-28 PROCEDURE — 0502F PR SUBSEQUENT PRENATAL CARE: ICD-10-PCS | Mod: CPTII,S$GLB,, | Performed by: OBSTETRICS & GYNECOLOGY

## 2023-07-28 PROCEDURE — 99999 PR PBB SHADOW E&M-EST. PATIENT-LVL II: CPT | Mod: PBBFAC,,, | Performed by: OBSTETRICS & GYNECOLOGY

## 2023-07-28 PROCEDURE — 0502F SUBSEQUENT PRENATAL CARE: CPT | Mod: CPTII,S$GLB,, | Performed by: OBSTETRICS & GYNECOLOGY

## 2023-07-28 PROCEDURE — 99999 PR PBB SHADOW E&M-EST. PATIENT-LVL II: ICD-10-PCS | Mod: PBBFAC,,, | Performed by: OBSTETRICS & GYNECOLOGY

## 2023-07-28 NOTE — PROGRESS NOTES
@28w2d: Doing well, denies CTX, LOF, VB. +FM.   Tdap and Rhogam today.  Gender reveal tomorrow!   Abnormal 1 hr, normal 3 hr GTT.  Interested in breast feeding. Send breast pump Rx next visit.   Discuss peds next visit.   RTC 2 weeks OB f/u. PTL/PPROM/PreE/FM precautions reviewed.

## 2023-07-28 NOTE — PROGRESS NOTES
Here for Tdap injection. Patient without complaint of pain at this time, injection given. Tolerated well no pain noted post injection advised to wait in lobby 15 minutes and report any adverse reactions.     Site:ld        Here for rhogam injection, no complaints at this time verified patient is Rh negative. No complaints of pain prior to or post injection patient advised to wait 15 minutes before leaving and to report and adverse reactions.      Site: lb

## 2023-08-01 ENCOUNTER — TELEPHONE (OUTPATIENT)
Dept: OBSTETRICS AND GYNECOLOGY | Facility: CLINIC | Age: 28
End: 2023-08-01
Payer: COMMERCIAL

## 2023-08-09 ENCOUNTER — PATIENT MESSAGE (OUTPATIENT)
Dept: OTHER | Facility: OTHER | Age: 28
End: 2023-08-09
Payer: COMMERCIAL

## 2023-08-10 DIAGNOSIS — Z34.02 ENCOUNTER FOR SUPERVISION OF NORMAL FIRST PREGNANCY IN SECOND TRIMESTER: ICD-10-CM

## 2023-08-10 RX ORDER — ONDANSETRON 4 MG/1
4 TABLET, ORALLY DISINTEGRATING ORAL EVERY 6 HOURS PRN
Qty: 30 TABLET | Refills: 3 | Status: ON HOLD | OUTPATIENT
Start: 2023-08-10 | End: 2023-10-02 | Stop reason: HOSPADM

## 2023-08-14 ENCOUNTER — ROUTINE PRENATAL (OUTPATIENT)
Dept: OBSTETRICS AND GYNECOLOGY | Facility: CLINIC | Age: 28
End: 2023-08-14
Payer: COMMERCIAL

## 2023-08-14 ENCOUNTER — LAB VISIT (OUTPATIENT)
Dept: LAB | Facility: OTHER | Age: 28
End: 2023-08-14
Attending: OBSTETRICS & GYNECOLOGY
Payer: COMMERCIAL

## 2023-08-14 VITALS — BODY MASS INDEX: 24.6 KG/M2 | DIASTOLIC BLOOD PRESSURE: 76 MMHG | SYSTOLIC BLOOD PRESSURE: 112 MMHG | WEIGHT: 143.31 LBS

## 2023-08-14 DIAGNOSIS — Z34.03 ENCOUNTER FOR SUPERVISION OF NORMAL FIRST PREGNANCY IN THIRD TRIMESTER: ICD-10-CM

## 2023-08-14 DIAGNOSIS — O99.013 ANEMIA DURING PREGNANCY IN THIRD TRIMESTER: ICD-10-CM

## 2023-08-14 DIAGNOSIS — O26.849 UTERINE SIZE DATE DISCREPANCY PREGNANCY: ICD-10-CM

## 2023-08-14 DIAGNOSIS — Z34.03 ENCOUNTER FOR SUPERVISION OF NORMAL FIRST PREGNANCY IN THIRD TRIMESTER: Primary | ICD-10-CM

## 2023-08-14 DIAGNOSIS — Z67.91 RH NEGATIVE STATE IN ANTEPARTUM PERIOD, THIRD TRIMESTER: ICD-10-CM

## 2023-08-14 DIAGNOSIS — Z3A.30 30 WEEKS GESTATION OF PREGNANCY: ICD-10-CM

## 2023-08-14 DIAGNOSIS — O26.893 RH NEGATIVE STATE IN ANTEPARTUM PERIOD, THIRD TRIMESTER: ICD-10-CM

## 2023-08-14 LAB
BASOPHILS # BLD AUTO: 0.03 K/UL (ref 0–0.2)
BASOPHILS NFR BLD: 0.4 % (ref 0–1.9)
DIFFERENTIAL METHOD: ABNORMAL
EOSINOPHIL # BLD AUTO: 0.2 K/UL (ref 0–0.5)
EOSINOPHIL NFR BLD: 2.4 % (ref 0–8)
ERYTHROCYTE [DISTWIDTH] IN BLOOD BY AUTOMATED COUNT: 13.1 % (ref 11.5–14.5)
FERRITIN SERPL-MCNC: 19 NG/ML (ref 20–300)
HCT VFR BLD AUTO: 32 % (ref 37–48.5)
HGB BLD-MCNC: 10.9 G/DL (ref 12–16)
HIV 1+2 AB+HIV1 P24 AG SERPL QL IA: NORMAL
IMM GRANULOCYTES # BLD AUTO: 0.06 K/UL (ref 0–0.04)
IMM GRANULOCYTES NFR BLD AUTO: 0.8 % (ref 0–0.5)
IRON SERPL-MCNC: 83 UG/DL (ref 30–160)
LYMPHOCYTES # BLD AUTO: 1.3 K/UL (ref 1–4.8)
LYMPHOCYTES NFR BLD: 16.8 % (ref 18–48)
MCH RBC QN AUTO: 30.4 PG (ref 27–31)
MCHC RBC AUTO-ENTMCNC: 34.1 G/DL (ref 32–36)
MCV RBC AUTO: 89 FL (ref 82–98)
MONOCYTES # BLD AUTO: 0.5 K/UL (ref 0.3–1)
MONOCYTES NFR BLD: 6.6 % (ref 4–15)
NEUTROPHILS # BLD AUTO: 5.6 K/UL (ref 1.8–7.7)
NEUTROPHILS NFR BLD: 73 % (ref 38–73)
NRBC BLD-RTO: 0 /100 WBC
PLATELET # BLD AUTO: 213 K/UL (ref 150–450)
PMV BLD AUTO: 11.2 FL (ref 9.2–12.9)
RBC # BLD AUTO: 3.59 M/UL (ref 4–5.4)
RPR SER QL: NORMAL
SATURATED IRON: 16 % (ref 20–50)
TOTAL IRON BINDING CAPACITY: 509 UG/DL (ref 250–450)
TRANSFERRIN SERPL-MCNC: 344 MG/DL (ref 200–375)
WBC # BLD AUTO: 7.6 K/UL (ref 3.9–12.7)

## 2023-08-14 PROCEDURE — 99999 PR PBB SHADOW E&M-EST. PATIENT-LVL III: CPT | Mod: PBBFAC,,, | Performed by: OBSTETRICS & GYNECOLOGY

## 2023-08-14 PROCEDURE — 83540 ASSAY OF IRON: CPT | Performed by: OBSTETRICS & GYNECOLOGY

## 2023-08-14 PROCEDURE — 87389 HIV-1 AG W/HIV-1&-2 AB AG IA: CPT | Performed by: OBSTETRICS & GYNECOLOGY

## 2023-08-14 PROCEDURE — 99999 PR PBB SHADOW E&M-EST. PATIENT-LVL III: ICD-10-PCS | Mod: PBBFAC,,, | Performed by: OBSTETRICS & GYNECOLOGY

## 2023-08-14 PROCEDURE — 86592 SYPHILIS TEST NON-TREP QUAL: CPT | Performed by: OBSTETRICS & GYNECOLOGY

## 2023-08-14 PROCEDURE — 84466 ASSAY OF TRANSFERRIN: CPT | Performed by: OBSTETRICS & GYNECOLOGY

## 2023-08-14 PROCEDURE — 85025 COMPLETE CBC W/AUTO DIFF WBC: CPT | Performed by: OBSTETRICS & GYNECOLOGY

## 2023-08-14 PROCEDURE — 0502F PR SUBSEQUENT PRENATAL CARE: ICD-10-PCS | Mod: CPTII,S$GLB,, | Performed by: OBSTETRICS & GYNECOLOGY

## 2023-08-14 PROCEDURE — 82728 ASSAY OF FERRITIN: CPT | Performed by: OBSTETRICS & GYNECOLOGY

## 2023-08-14 PROCEDURE — 0502F SUBSEQUENT PRENATAL CARE: CPT | Mod: CPTII,S$GLB,, | Performed by: OBSTETRICS & GYNECOLOGY

## 2023-08-14 PROCEDURE — 36415 COLL VENOUS BLD VENIPUNCTURE: CPT | Performed by: OBSTETRICS & GYNECOLOGY

## 2023-08-14 NOTE — PROGRESS NOTES
@30w5d: Doing well, denies CTX, LOF, VB. +FM.   More nausea in the past week or two, recommend increased hydration, and Zofran PRN.   TDap and Rhogam done.  Gender reveal BOY Anoop. :)   3T labs and iron studies today.    Uterine size < dates, growth scan ordered.  Peds list and breast pump Rx given.   RTC 2 weeks OB f/u. PTL/PPROM/PreE/FM precautions reviewed.

## 2023-08-17 ENCOUNTER — PROCEDURE VISIT (OUTPATIENT)
Dept: MATERNAL FETAL MEDICINE | Facility: CLINIC | Age: 28
End: 2023-08-17
Payer: COMMERCIAL

## 2023-08-17 DIAGNOSIS — Z36.89 ENCOUNTER FOR ULTRASOUND TO ASSESS FETAL GROWTH: Primary | ICD-10-CM

## 2023-08-17 DIAGNOSIS — O26.849 UTERINE SIZE DATE DISCREPANCY PREGNANCY: ICD-10-CM

## 2023-08-17 PROCEDURE — 76816 US MFM PROCEDURE (VIEWPOINT): ICD-10-PCS | Mod: S$GLB,,, | Performed by: OBSTETRICS & GYNECOLOGY

## 2023-08-17 PROCEDURE — 76816 OB US FOLLOW-UP PER FETUS: CPT | Mod: S$GLB,,, | Performed by: OBSTETRICS & GYNECOLOGY

## 2023-08-21 ENCOUNTER — TELEPHONE (OUTPATIENT)
Dept: OBSTETRICS AND GYNECOLOGY | Facility: CLINIC | Age: 28
End: 2023-08-21
Payer: COMMERCIAL

## 2023-08-21 NOTE — TELEPHONE ENCOUNTER
----- Message from Maxime Ly sent at 8/21/2023 11:24 AM CDT -----  Regarding: Maternity Leave Papers  Name of Who is Calling:  Patient          What is the request in detail:  Patient would like to have her maternity leave papers filled , Please give patient a call            Can the clinic reply by [facility]: Yes            What Number to Call Back if not in [facility]:[phone]

## 2023-08-23 ENCOUNTER — PATIENT MESSAGE (OUTPATIENT)
Dept: OTHER | Facility: OTHER | Age: 28
End: 2023-08-23
Payer: COMMERCIAL

## 2023-08-23 ENCOUNTER — HOSPITAL ENCOUNTER (EMERGENCY)
Facility: OTHER | Age: 28
Discharge: HOME | End: 2023-08-23
Attending: OBSTETRICS & GYNECOLOGY
Payer: COMMERCIAL

## 2023-08-23 VITALS
HEART RATE: 62 BPM | SYSTOLIC BLOOD PRESSURE: 124 MMHG | OXYGEN SATURATION: 100 % | TEMPERATURE: 98 F | RESPIRATION RATE: 16 BRPM | DIASTOLIC BLOOD PRESSURE: 72 MMHG

## 2023-08-23 DIAGNOSIS — R11.11 VOMITING WITHOUT NAUSEA, UNSPECIFIED VOMITING TYPE: Primary | ICD-10-CM

## 2023-08-23 LAB
BILIRUB SERPL-MCNC: NORMAL MG/DL
BLOOD URINE, POC: NORMAL
COLOR, POC UA: YELLOW
GLUCOSE UR QL STRIP: NORMAL
KETONES UR QL STRIP: NORMAL
LEUKOCYTE ESTERASE URINE, POC: NORMAL
NITRITE, POC UA: NORMAL
PH, POC UA: 7
PROTEIN, POC: NORMAL
SPECIFIC GRAVITY, POC UA: 1
UROBILINOGEN, POC UA: NORMAL

## 2023-08-23 PROCEDURE — 25000003 PHARM REV CODE 250

## 2023-08-23 PROCEDURE — 99284 EMERGENCY DEPT VISIT MOD MDM: CPT | Mod: 25

## 2023-08-23 PROCEDURE — 59025 FETAL NON-STRESS TEST: CPT

## 2023-08-23 RX ORDER — FAMOTIDINE 20 MG/1
20 TABLET, FILM COATED ORAL 2 TIMES DAILY
Status: DISCONTINUED | OUTPATIENT
Start: 2023-08-23 | End: 2023-08-23

## 2023-08-23 RX ORDER — FAMOTIDINE 20 MG/1
20 TABLET, FILM COATED ORAL 2 TIMES DAILY
Qty: 60 TABLET | Refills: 11 | Status: SHIPPED | OUTPATIENT
Start: 2023-08-23 | End: 2024-08-22

## 2023-08-23 RX ORDER — ACETAMINOPHEN 325 MG/1
650 TABLET ORAL ONCE
Status: DISCONTINUED | OUTPATIENT
Start: 2023-08-23 | End: 2023-08-23

## 2023-08-23 RX ORDER — ACETAMINOPHEN 500 MG
1000 TABLET ORAL ONCE
Status: COMPLETED | OUTPATIENT
Start: 2023-08-23 | End: 2023-08-23

## 2023-08-23 RX ADMIN — ACETAMINOPHEN 1000 MG: 500 TABLET ORAL at 10:08

## 2023-08-23 NOTE — ED PROVIDER NOTES
Encounter Date: 2023       History     Chief Complaint   Patient presents with    Vomiting     Ms. Johnson is a 29yo  at 32w0d with an uncomplicated IUP here today with continual emesis. Patient also endorses a HA but denies vision changes.  She has not attempt OTC analgesia for her HA, but has been taking ondansetron q4h in an attempt to prevent further emesis, despite not being nauseous.    Patient denies vaginal bleeding, contractions, fluid loss, or dhisc      Review of patient's allergies indicates:  No Known Allergies  No past medical history on file.  No past surgical history on file.  Family History   Problem Relation Age of Onset    Breast cancer Maternal Grandmother     Stomach cancer Father     Colon cancer Neg Hx     Ovarian cancer Neg Hx      Social History     Tobacco Use    Smoking status: Never    Smokeless tobacco: Never   Substance Use Topics    Alcohol use: Not Currently    Drug use: Not Currently     Types: Marijuana     Review of Systems   Constitutional:  Positive for appetite change. Negative for chills, diaphoresis, fatigue and fever.   HENT:  Negative for trouble swallowing.    Eyes:  Negative for visual disturbance.   Respiratory:  Negative for choking, chest tightness and shortness of breath.    Cardiovascular:  Negative for chest pain.   Gastrointestinal:  Positive for abdominal pain and vomiting. Negative for diarrhea and nausea.   Genitourinary:  Negative for dysuria, hematuria, vaginal bleeding and vaginal discharge.   Musculoskeletal:  Negative for arthralgias and myalgias.   Skin:  Negative for rash.   Neurological:  Positive for headaches. Negative for light-headedness.       Physical Exam     Initial Vitals [23 0925]   BP Pulse Resp Temp SpO2   126/83 81 16 98.4 °F (36.9 °C) 100 %      MAP       --         Physical Exam    Nursing note and vitals reviewed.  Constitutional: She appears well-developed and well-nourished.   HENT:   Head: Normocephalic and atraumatic.    Nose: Nose normal.   Eyes: EOM are normal. No scleral icterus.   Neck:   Normal range of motion.  Cardiovascular:  Normal rate and intact distal pulses.           Pulmonary/Chest: No respiratory distress. She exhibits no tenderness.   Abdominal: Abdomen is soft. Bowel sounds are normal.   Musculoskeletal:         General: Normal range of motion.      Cervical back: Normal range of motion.     Neurological: She is alert and oriented to person, place, and time. She has normal strength.   Skin: Skin is warm and dry.   Psychiatric: She has a normal mood and affect. Thought content normal.         ED Course   Obtain Fetal nonstress test (NST)    Date/Time: 8/23/2023 11:34 AM    Performed by: Christopher Mcgarry MD  Authorized by: Christopher Mcgarry MD    Nonstress Test:     Variability:  6-25 BPM    Decelerations:  None    Accelerations:  15 bpm    Acoustic Stimulator: No      Baseline:  140    Uterine Irritability: No      Contractions:  Not present  Biophysical Profile:     Nonstress Test Interpretation: reactive      Overall Impression:  Reassuring    Labs Reviewed   POCT URINALYSIS, DIPSTICK OR TABLET REAGENT, AUTOMATED, WITH MICROSCOP   POCT URINALYSIS, DIPSTICK OR TABLET REAGENT, AUTOMATED, WITH MICROSCOP          Imaging Results    None          Medications   famotidine tablet 20 mg (has no administration in time range)   acetaminophen tablet 1,000 mg (1,000 mg Oral Given 8/23/23 1017)     Medical Decision Making  Amount and/or Complexity of Data Reviewed  Labs: ordered.              Attending Attestation:   Physician Attestation Statement for Resident:  As the supervising MD   Physician Attestation Statement: I have personally seen and examined this patient.   I agree with the above history.  -:   As the supervising MD I agree with the above PE.     As the supervising MD I agree with the above treatment, course, plan, and disposition.   I was personally present during the critical portions of the procedure(s) performed  by the resident and was immediately available in the ED to provide services and assistance as needed during the entire procedure.                             Medical Decision Makinyo  at 32w0d with an uncomplicated IUP here today with continual emesis    VSS  NST, RR    Vomiting  - PO challenge -> pt tolerating PO solids and liquids w/o emesis  - encouraged cont PO hydration and eating at home  - discussed use of ondansetron to treat nausea  - discussed not using q4hr ondansetron for emesis alone    HA  - 1000g tylenol w/ good effect  - encouraged pt to cont tylenol PRN    GERD  - described as occasional regurgitation after meals  - when emesis present, NBNB  - rx for femotidine 10mg bid    Pt stable for discharge.  Return precautions given; pt voiced understanding      Clinical Impression:   Final diagnoses:  [R11.11] Vomiting without nausea, unspecified vomiting type (Primary)        ED Disposition Condition    Discharge Stable          ED Prescriptions    None       Follow-up Information    None          Christopher Mcgarry MD  Resident  23 1131       Christopher Mcgarry MD  Resident  23 1135       Wendy Ojeda MD  23 2141

## 2023-08-27 ENCOUNTER — HOSPITAL ENCOUNTER (EMERGENCY)
Facility: OTHER | Age: 28
Discharge: HOME | End: 2023-08-27
Attending: OBSTETRICS & GYNECOLOGY
Payer: COMMERCIAL

## 2023-08-27 VITALS
HEART RATE: 68 BPM | TEMPERATURE: 98 F | OXYGEN SATURATION: 100 % | SYSTOLIC BLOOD PRESSURE: 127 MMHG | RESPIRATION RATE: 16 BRPM | DIASTOLIC BLOOD PRESSURE: 81 MMHG

## 2023-08-27 DIAGNOSIS — O21.9 NAUSEA AND VOMITING DURING PREGNANCY: Primary | ICD-10-CM

## 2023-08-27 DIAGNOSIS — Z3A.32 32 WEEKS GESTATION OF PREGNANCY: ICD-10-CM

## 2023-08-27 LAB
ALBUMIN SERPL BCP-MCNC: 2.8 G/DL (ref 3.5–5.2)
ALP SERPL-CCNC: 61 U/L (ref 55–135)
ALT SERPL W/O P-5'-P-CCNC: 12 U/L (ref 10–44)
ANION GAP SERPL CALC-SCNC: 11 MMOL/L (ref 8–16)
AST SERPL-CCNC: 17 U/L (ref 10–40)
BASOPHILS # BLD AUTO: 0.03 K/UL (ref 0–0.2)
BASOPHILS NFR BLD: 0.4 % (ref 0–1.9)
BILIRUB SERPL-MCNC: 0.6 MG/DL (ref 0.1–1)
BILIRUB SERPL-MCNC: ABNORMAL MG/DL
BLOOD URINE, POC: ABNORMAL
BUN SERPL-MCNC: 3 MG/DL (ref 6–20)
CALCIUM SERPL-MCNC: 8.6 MG/DL (ref 8.7–10.5)
CHLORIDE SERPL-SCNC: 104 MMOL/L (ref 95–110)
CO2 SERPL-SCNC: 23 MMOL/L (ref 23–29)
COLOR, POC UA: YELLOW
CREAT SERPL-MCNC: 0.6 MG/DL (ref 0.5–1.4)
CREAT UR-MCNC: 92.6 MG/DL (ref 15–325)
DIFFERENTIAL METHOD: ABNORMAL
EOSINOPHIL # BLD AUTO: 0.1 K/UL (ref 0–0.5)
EOSINOPHIL NFR BLD: 1.5 % (ref 0–8)
ERYTHROCYTE [DISTWIDTH] IN BLOOD BY AUTOMATED COUNT: 12.9 % (ref 11.5–14.5)
EST. GFR  (NO RACE VARIABLE): >60 ML/MIN/1.73 M^2
GLUCOSE SERPL-MCNC: 85 MG/DL (ref 70–110)
GLUCOSE UR QL STRIP: ABNORMAL
HCT VFR BLD AUTO: 30.1 % (ref 37–48.5)
HGB BLD-MCNC: 10.7 G/DL (ref 12–16)
IMM GRANULOCYTES # BLD AUTO: 0.04 K/UL (ref 0–0.04)
IMM GRANULOCYTES NFR BLD AUTO: 0.6 % (ref 0–0.5)
KETONES UR QL STRIP: ABNORMAL
LEUKOCYTE ESTERASE URINE, POC: ABNORMAL
LYMPHOCYTES # BLD AUTO: 1.4 K/UL (ref 1–4.8)
LYMPHOCYTES NFR BLD: 19.5 % (ref 18–48)
MCH RBC QN AUTO: 30.7 PG (ref 27–31)
MCHC RBC AUTO-ENTMCNC: 35.5 G/DL (ref 32–36)
MCV RBC AUTO: 86 FL (ref 82–98)
MONOCYTES # BLD AUTO: 0.5 K/UL (ref 0.3–1)
MONOCYTES NFR BLD: 6.9 % (ref 4–15)
NEUTROPHILS # BLD AUTO: 5.1 K/UL (ref 1.8–7.7)
NEUTROPHILS NFR BLD: 71.1 % (ref 38–73)
NITRITE, POC UA: ABNORMAL
NRBC BLD-RTO: 0 /100 WBC
PH, POC UA: 7
PLATELET # BLD AUTO: 195 K/UL (ref 150–450)
PMV BLD AUTO: 11.7 FL (ref 9.2–12.9)
POTASSIUM SERPL-SCNC: 2.7 MMOL/L (ref 3.5–5.1)
PROT SERPL-MCNC: 6.1 G/DL (ref 6–8.4)
PROT UR-MCNC: 13 MG/DL (ref 0–15)
PROT/CREAT UR: 0.14 MG/G{CREAT} (ref 0–0.2)
PROTEIN, POC: ABNORMAL
RBC # BLD AUTO: 3.49 M/UL (ref 4–5.4)
SODIUM SERPL-SCNC: 138 MMOL/L (ref 136–145)
SPECIFIC GRAVITY, POC UA: 1
UROBILINOGEN, POC UA: 1
WBC # BLD AUTO: 7.12 K/UL (ref 3.9–12.7)

## 2023-08-27 PROCEDURE — 63600175 PHARM REV CODE 636 W HCPCS

## 2023-08-27 PROCEDURE — 99284 EMERGENCY DEPT VISIT MOD MDM: CPT | Mod: 25

## 2023-08-27 PROCEDURE — 25000003 PHARM REV CODE 250

## 2023-08-27 PROCEDURE — 81002 URINALYSIS NONAUTO W/O SCOPE: CPT

## 2023-08-27 PROCEDURE — 80053 COMPREHEN METABOLIC PANEL: CPT | Performed by: OBSTETRICS & GYNECOLOGY

## 2023-08-27 PROCEDURE — 99284 EMERGENCY DEPT VISIT MOD MDM: CPT | Mod: 25,,, | Performed by: OBSTETRICS & GYNECOLOGY

## 2023-08-27 PROCEDURE — 96361 HYDRATE IV INFUSION ADD-ON: CPT

## 2023-08-27 PROCEDURE — 85025 COMPLETE CBC W/AUTO DIFF WBC: CPT | Performed by: OBSTETRICS & GYNECOLOGY

## 2023-08-27 PROCEDURE — 59025 FETAL NON-STRESS TEST: CPT

## 2023-08-27 PROCEDURE — 82570 ASSAY OF URINE CREATININE: CPT | Performed by: OBSTETRICS & GYNECOLOGY

## 2023-08-27 PROCEDURE — 59025 FETAL NON-STRESS TEST: CPT | Mod: 26,,, | Performed by: OBSTETRICS & GYNECOLOGY

## 2023-08-27 PROCEDURE — 99284 PR EMERGENCY DEPT VISIT,LEVEL IV: ICD-10-PCS | Mod: 25,,, | Performed by: OBSTETRICS & GYNECOLOGY

## 2023-08-27 PROCEDURE — 59025 PR FETAL 2N-STRESS TEST: ICD-10-PCS | Mod: 26,,, | Performed by: OBSTETRICS & GYNECOLOGY

## 2023-08-27 PROCEDURE — 96374 THER/PROPH/DIAG INJ IV PUSH: CPT

## 2023-08-27 RX ORDER — POTASSIUM CHLORIDE 20 MEQ/1
40 TABLET, EXTENDED RELEASE ORAL ONCE
Qty: 2 TABLET | Refills: 0 | Status: SHIPPED | OUTPATIENT
Start: 2023-08-27 | End: 2023-08-27 | Stop reason: SDUPTHER

## 2023-08-27 RX ORDER — ONDANSETRON 2 MG/ML
4 INJECTION INTRAMUSCULAR; INTRAVENOUS ONCE
Status: COMPLETED | OUTPATIENT
Start: 2023-08-27 | End: 2023-08-27

## 2023-08-27 RX ORDER — ACETAMINOPHEN 500 MG
1000 TABLET ORAL ONCE
Status: COMPLETED | OUTPATIENT
Start: 2023-08-27 | End: 2023-08-27

## 2023-08-27 RX ORDER — POTASSIUM CHLORIDE 20 MEQ/1
80 TABLET, EXTENDED RELEASE ORAL ONCE
Qty: 4 TABLET | Refills: 0 | Status: SHIPPED | OUTPATIENT
Start: 2023-08-27 | End: 2023-08-27

## 2023-08-27 RX ORDER — POTASSIUM CHLORIDE 20 MEQ/1
40 TABLET, EXTENDED RELEASE ORAL ONCE
Status: COMPLETED | OUTPATIENT
Start: 2023-08-27 | End: 2023-08-27

## 2023-08-27 RX ADMIN — SODIUM CHLORIDE, POTASSIUM CHLORIDE, SODIUM LACTATE AND CALCIUM CHLORIDE 1000 ML: 600; 310; 30; 20 INJECTION, SOLUTION INTRAVENOUS at 01:08

## 2023-08-27 RX ADMIN — ACETAMINOPHEN 1000 MG: 500 TABLET ORAL at 01:08

## 2023-08-27 RX ADMIN — ONDANSETRON 4 MG: 2 INJECTION INTRAMUSCULAR; INTRAVENOUS at 01:08

## 2023-08-27 RX ADMIN — POTASSIUM CHLORIDE 40 MEQ: 1500 TABLET, EXTENDED RELEASE ORAL at 02:08

## 2023-08-27 NOTE — ED PROVIDER NOTES
Encounter Date: 2023       History     Chief Complaint   Patient presents with    Emesis     Kika Johnson is a 28 y.o. F at 32w4d presents complaining of nausea and vomiting. She was seen in the [facility] 4 days prior top presentation for the same complaint. At that time, she was taking Zofran 4mg q4 in the absence of symptoms which she was advised against doing. Her last solid food intake was 2 days prior to presentation. She has thrown up 2-3 per day for the past two days. She denies taking any zofran since her last [facility] visit. She has been able to tolerate popsicles. She has not been able to tolerate PO hydration. She denies diarrhea. She endorses a headache for which she took aspirin prior to arrival with no improvement. She denies contractions, vaginal bleeding, vaginal discharge, leakage of fluid, and endorses good fetal movement. Of note, she was previously diagnosed with hyperemesis during this pregnancy.          Review of patient's allergies indicates:  No Known Allergies  History reviewed. No pertinent past medical history.  No past surgical history on file.  Family History   Problem Relation Age of Onset    Breast cancer Maternal Grandmother     Stomach cancer Father     Colon cancer Neg Hx     Ovarian cancer Neg Hx      Social History     Tobacco Use    Smoking status: Never    Smokeless tobacco: Never   Substance Use Topics    Alcohol use: Not Currently    Drug use: Not Currently     Types: Marijuana     Review of Systems   Constitutional:  Positive for fatigue. Negative for chills and fever.   HENT:  Negative for congestion.    Eyes:  Negative for visual disturbance.   Respiratory:  Negative for shortness of breath.    Cardiovascular:  Negative for chest pain and leg swelling.   Gastrointestinal:  Positive for nausea and vomiting. Negative for constipation and diarrhea.   Genitourinary:  Negative for dysuria, vaginal bleeding, vaginal discharge and vaginal pain.   Musculoskeletal:   Negative for arthralgias and joint swelling.   Skin:  Negative for rash.   Neurological:  Negative for syncope and headaches.   Psychiatric/Behavioral:  Negative for confusion and sleep disturbance.        Physical Exam     Initial Vitals   BP Pulse Resp Temp SpO2   08/27/23 1304 08/27/23 1302 08/27/23 1304 08/27/23 1304 08/27/23 1302   128/83 97 16 98.2 °F (36.8 °C) 97 %      MAP       --                Physical Exam    Vitals reviewed.  Constitutional: She appears well-developed and well-nourished. No distress.   HENT:   Head: Normocephalic and atraumatic.   Eyes: EOM are normal.   Neck:   Normal range of motion.  Cardiovascular:  Normal rate.           Pulmonary/Chest: No respiratory distress.   Normal work of breathing   Abdominal: There is no abdominal tenderness.   Gravid uterus There is no rebound and no guarding.   Musculoskeletal:         General: Normal range of motion.      Cervical back: Normal range of motion.     Neurological: She is alert and oriented to person, place, and time.   Skin: Skin is warm and dry.   Psychiatric: She has a normal mood and affect. Her behavior is normal. Thought content normal.       ED Course   Obtain Fetal nonstress test (NST)    Date/Time: 8/27/2023 4:47 PM    Performed by: Elva Mcclelland MD  Authorized by: Elva Mcclelland MD    Nonstress Test:     Variability:  6-25 BPM    Decelerations:  None    Accelerations:  15 bpm    Acoustic Stimulator: No      Baseline:  145    Uterine Irritability: No      Contractions:  Not present  Biophysical Profile:     Nonstress Test Interpretation: reactive      Overall Impression:  Reassuring    Labs Reviewed   CBC W/ AUTO DIFFERENTIAL - Abnormal; Notable for the following components:       Result Value    RBC 3.49 (*)     Hemoglobin 10.7 (*)     Hematocrit 30.1 (*)     Immature Granulocytes 0.6 (*)     All other components within normal limits   COMPREHENSIVE METABOLIC PANEL - Abnormal; Notable for the following components:    Potassium  2.7 (*)     BUN 3 (*)     Calcium 8.6 (*)     Albumin 2.8 (*)     All other components within normal limits    Narrative:     K critical result(s) called and verbal readback obtained from Margot Dean RN. by MCJESI 2023 14:31   POCT URINALYSIS, DIPSTICK OR TABLET REAGENT, AUTOMATED, WITH MICROSCOP - Abnormal; Notable for the following components:    Protein, POC trace (*)     Urobilinogen, UA 1 (*)     All other components within normal limits   PROTEIN / CREATININE RATIO, URINE    Narrative:     Specimen Source->Urine          Imaging Results    None          Medications   acetaminophen tablet 1,000 mg (1,000 mg Oral Given 23 1340)   ondansetron injection 4 mg (4 mg Intravenous Given 23 1341)   lactated ringers bolus 1,000 mL (0 mLs Intravenous Stopped 23 1434)   potassium chloride SA CR tablet 40 mEq (40 mEq Oral Given 23 1444)     Medical Decision Making  Kika Johnson is a 28 y.o. F  at 32w4d who presents with nausea and vomiting.     - VSS and WNL  - PE as above  - NST: 145 bpm, mod BTBV, + accels, - decels, reactive and reassuring  - TOCO: no contractions seen   - Labs: H/H stable, Potassium decreased at 2.7  - Urine dip: trace protein, no ketones, 1+ urobili  - Given 1 L LR, 4mg IV zofran, 1G tylenol  - Given decreased potassium on CMP, given 40meq K in [facility], 80 meq K sent to pharmacy for patient to take tonight   - Symptoms improved with zofran, tylenol, and IVFs.    - Passed PO challenge in OB ED  - Overall stable for discharge home, encourage small, frequent, bland meals and PO hydration.  Zofran prn.    - Given strict ED return precautions, all questions answered       Amount and/or Complexity of Data Reviewed  Labs: ordered. Decision-making details documented in ED Course.    Risk  OTC drugs.  Prescription drug management.              Attending Attestation:   Physician Attestation Statement for Resident:  As the supervising MD   Physician Attestation Statement: I  have personally seen and examined this patient.   I agree with the above history.  -:   As the supervising MD I agree with the above PE.     As the supervising MD I agree with the above treatment, course, plan, and disposition.   I was personally present during the critical portions of the procedure(s) performed by the resident and was immediately available in the ED to provide services and assistance as needed during the entire procedure.                  ED Course as of 08/27/23 1652   Sun Aug 27, 2023   1310 BP: 128/83 [AW]   1312 Temp: 98.2 °F (36.8 °C) [AW]   1312 Pulse: 76 [AW]   1312 Resp: 16 [AW]   1312 SpO2: 97 % [AW]   1312 POCT urinalysis, dipstick or tablet reag(!) [AW]      ED Course User Index  [AW] Elva Mcclelland MD                    Clinical Impression:   Final diagnoses:  [Z3A.32] 32 weeks gestation of pregnancy  [O21.9] Nausea and vomiting during pregnancy (Primary)        ED Disposition Condition    Discharge Stable          ED Prescriptions       Medication Sig Dispense Start Date End Date Auth. Provider    potassium chloride SA (K-DUR,KLOR-CON) 20 MEQ tablet  (Status: Discontinued) Take 2 tablets (40 mEq total) by mouth once. for 1 dose 2 tablet 8/27/2023 8/27/2023 Claudette Fortune MD    potassium chloride SA (K-DUR,KLOR-CON) 20 MEQ tablet (Expires today) Take 4 tablets (80 mEq total) by mouth once. for 1 dose 4 tablet 8/27/2023 8/27/2023 Elva Mcclelland MD          Follow-up Information    None          Elva Mcclelland MD  Resident  08/27/23 0268            Ellen Freeman MD  08/29/23 4078

## 2023-08-27 NOTE — DISCHARGE INSTRUCTIONS
Call clinic [phone] or L & D after hours [phone] for:   Vaginal bleeding,   Leaking fluids,   Contractions (4-5 in ONE hour),   Decreased fetal movements (10 kicks in 2 hours),   Headache not relived by Tylenol,   Blurry vision, or    Temp of 100.4 or greater.     Begin doing fetal kick counts, at least 10 movements in 2 hours starting at 28 weeks of gestation.    Keep next clinic appointment (see appointment date and time below):

## 2023-08-28 ENCOUNTER — ROUTINE PRENATAL (OUTPATIENT)
Dept: OBSTETRICS AND GYNECOLOGY | Facility: CLINIC | Age: 28
End: 2023-08-28
Payer: COMMERCIAL

## 2023-08-28 VITALS
SYSTOLIC BLOOD PRESSURE: 108 MMHG | DIASTOLIC BLOOD PRESSURE: 74 MMHG | BODY MASS INDEX: 24.75 KG/M2 | WEIGHT: 144.19 LBS

## 2023-08-28 DIAGNOSIS — Z3A.32 32 WEEKS GESTATION OF PREGNANCY: ICD-10-CM

## 2023-08-28 DIAGNOSIS — Z67.91 RH NEGATIVE STATE IN ANTEPARTUM PERIOD, THIRD TRIMESTER: ICD-10-CM

## 2023-08-28 DIAGNOSIS — O21.9 NAUSEA AND VOMITING IN PREGNANCY: ICD-10-CM

## 2023-08-28 DIAGNOSIS — Z34.03 ENCOUNTER FOR SUPERVISION OF NORMAL FIRST PREGNANCY IN THIRD TRIMESTER: Primary | ICD-10-CM

## 2023-08-28 DIAGNOSIS — O26.849 UTERINE SIZE DATE DISCREPANCY PREGNANCY: ICD-10-CM

## 2023-08-28 DIAGNOSIS — O26.893 RH NEGATIVE STATE IN ANTEPARTUM PERIOD, THIRD TRIMESTER: ICD-10-CM

## 2023-08-28 PROCEDURE — 99999 PR PBB SHADOW E&M-EST. PATIENT-LVL II: ICD-10-PCS | Mod: PBBFAC,,, | Performed by: OBSTETRICS & GYNECOLOGY

## 2023-08-28 PROCEDURE — 0502F PR SUBSEQUENT PRENATAL CARE: ICD-10-PCS | Mod: CPTII,S$GLB,, | Performed by: OBSTETRICS & GYNECOLOGY

## 2023-08-28 PROCEDURE — 99999 PR PBB SHADOW E&M-EST. PATIENT-LVL II: CPT | Mod: PBBFAC,,, | Performed by: OBSTETRICS & GYNECOLOGY

## 2023-08-28 PROCEDURE — 0502F SUBSEQUENT PRENATAL CARE: CPT | Mod: CPTII,S$GLB,, | Performed by: OBSTETRICS & GYNECOLOGY

## 2023-08-28 RX ORDER — METOCLOPRAMIDE 10 MG/1
10 TABLET ORAL EVERY 8 HOURS PRN
Qty: 90 TABLET | Refills: 1 | Status: SHIPPED | OUTPATIENT
Start: 2023-08-28 | End: 2024-08-27

## 2023-08-28 NOTE — PROGRESS NOTES
@32w5d: Doing well, denies CTX, LOF, VB. +FM.   Went to the ER twice this past week for nausea/vomiting, hypokalemia on labs. Will repeat BMP with next visit. Recommend scheduling alternating Zofran/Reglan (Rx sent today) every 4 hours for symptom management. Recommend Pedialyte, Liquid IV, Gatorade/Powerade for extra electrolytes. Rx for oral potassium given in [facility] and patient has been taking it.   Size < dates, EFW 20% AC 10%, repeat growth US scheduled 9/12.  Rhogam and TDap done.  FMLA papers signed and sent off today.  Patient aware I will be out of the office for her next visit.  Delivery, blood, and circumcision consents signed today.   RTC 2 weeks OB f/u. PTL/PPROM/PreE/FM precautions reviewed.

## 2023-08-29 ENCOUNTER — PATIENT MESSAGE (OUTPATIENT)
Dept: OBSTETRICS AND GYNECOLOGY | Facility: CLINIC | Age: 28
End: 2023-08-29
Payer: COMMERCIAL

## 2023-09-12 ENCOUNTER — PROCEDURE VISIT (OUTPATIENT)
Dept: MATERNAL FETAL MEDICINE | Facility: CLINIC | Age: 28
End: 2023-09-12
Payer: COMMERCIAL

## 2023-09-12 DIAGNOSIS — Z36.89 ENCOUNTER FOR ULTRASOUND TO ASSESS FETAL GROWTH: ICD-10-CM

## 2023-09-12 PROCEDURE — 76816 US MFM PROCEDURE (VIEWPOINT): ICD-10-PCS | Mod: S$GLB,,, | Performed by: OBSTETRICS & GYNECOLOGY

## 2023-09-12 PROCEDURE — 76816 OB US FOLLOW-UP PER FETUS: CPT | Mod: S$GLB,,, | Performed by: OBSTETRICS & GYNECOLOGY

## 2023-09-13 ENCOUNTER — PATIENT MESSAGE (OUTPATIENT)
Dept: OTHER | Facility: OTHER | Age: 28
End: 2023-09-13
Payer: COMMERCIAL

## 2023-09-14 ENCOUNTER — ROUTINE PRENATAL (OUTPATIENT)
Dept: OBSTETRICS AND GYNECOLOGY | Facility: CLINIC | Age: 28
End: 2023-09-14
Payer: COMMERCIAL

## 2023-09-14 ENCOUNTER — PATIENT MESSAGE (OUTPATIENT)
Dept: OBSTETRICS AND GYNECOLOGY | Facility: CLINIC | Age: 28
End: 2023-09-14
Payer: COMMERCIAL

## 2023-09-14 ENCOUNTER — LAB VISIT (OUTPATIENT)
Dept: LAB | Facility: OTHER | Age: 28
End: 2023-09-14
Attending: OBSTETRICS & GYNECOLOGY
Payer: COMMERCIAL

## 2023-09-14 VITALS — SYSTOLIC BLOOD PRESSURE: 138 MMHG | DIASTOLIC BLOOD PRESSURE: 90 MMHG | WEIGHT: 151 LBS | BODY MASS INDEX: 25.92 KG/M2

## 2023-09-14 DIAGNOSIS — Z3A.35 35 WEEKS GESTATION OF PREGNANCY: Primary | ICD-10-CM

## 2023-09-14 DIAGNOSIS — Z34.03 ENCOUNTER FOR SUPERVISION OF NORMAL FIRST PREGNANCY IN THIRD TRIMESTER: ICD-10-CM

## 2023-09-14 DIAGNOSIS — E87.6 HYPOKALEMIA: Primary | ICD-10-CM

## 2023-09-14 DIAGNOSIS — R03.0 ELEVATED BLOOD PRESSURE READING: ICD-10-CM

## 2023-09-14 DIAGNOSIS — O21.9 NAUSEA AND VOMITING IN PREGNANCY: ICD-10-CM

## 2023-09-14 LAB
ANION GAP SERPL CALC-SCNC: 8 MMOL/L (ref 8–16)
AST SERPL-CCNC: 23 U/L (ref 10–40)
BASOPHILS # BLD AUTO: 0.03 K/UL (ref 0–0.2)
BASOPHILS NFR BLD: 0.3 % (ref 0–1.9)
BUN SERPL-MCNC: 3 MG/DL (ref 6–20)
CALCIUM SERPL-MCNC: 9 MG/DL (ref 8.7–10.5)
CHLORIDE SERPL-SCNC: 102 MMOL/L (ref 95–110)
CO2 SERPL-SCNC: 27 MMOL/L (ref 23–29)
CREAT SERPL-MCNC: 0.6 MG/DL (ref 0.5–1.4)
CREAT UR-MCNC: 53 MG/DL (ref 15–325)
DIFFERENTIAL METHOD: ABNORMAL
EOSINOPHIL # BLD AUTO: 0.2 K/UL (ref 0–0.5)
EOSINOPHIL NFR BLD: 2.3 % (ref 0–8)
ERYTHROCYTE [DISTWIDTH] IN BLOOD BY AUTOMATED COUNT: 12.5 % (ref 11.5–14.5)
EST. GFR  (NO RACE VARIABLE): >60 ML/MIN/1.73 M^2
GLUCOSE SERPL-MCNC: 80 MG/DL (ref 70–110)
HCT VFR BLD AUTO: 33.1 % (ref 37–48.5)
HGB BLD-MCNC: 11.2 G/DL (ref 12–16)
IMM GRANULOCYTES # BLD AUTO: 0.06 K/UL (ref 0–0.04)
IMM GRANULOCYTES NFR BLD AUTO: 0.7 % (ref 0–0.5)
LYMPHOCYTES # BLD AUTO: 1.6 K/UL (ref 1–4.8)
LYMPHOCYTES NFR BLD: 18 % (ref 18–48)
MCH RBC QN AUTO: 30 PG (ref 27–31)
MCHC RBC AUTO-ENTMCNC: 33.8 G/DL (ref 32–36)
MCV RBC AUTO: 89 FL (ref 82–98)
MONOCYTES # BLD AUTO: 0.7 K/UL (ref 0.3–1)
MONOCYTES NFR BLD: 8.2 % (ref 4–15)
NEUTROPHILS # BLD AUTO: 6.2 K/UL (ref 1.8–7.7)
NEUTROPHILS NFR BLD: 70.5 % (ref 38–73)
NRBC BLD-RTO: 0 /100 WBC
PLATELET # BLD AUTO: 208 K/UL (ref 150–450)
PMV BLD AUTO: 11.8 FL (ref 9.2–12.9)
POTASSIUM SERPL-SCNC: 2.9 MMOL/L (ref 3.5–5.1)
PROT UR-MCNC: 8 MG/DL (ref 0–15)
PROT/CREAT UR: 0.15 MG/G{CREAT} (ref 0–0.2)
RBC # BLD AUTO: 3.73 M/UL (ref 4–5.4)
SODIUM SERPL-SCNC: 137 MMOL/L (ref 136–145)
WBC # BLD AUTO: 8.81 K/UL (ref 3.9–12.7)

## 2023-09-14 PROCEDURE — 0502F PR SUBSEQUENT PRENATAL CARE: ICD-10-PCS | Mod: CPTII,S$GLB,, | Performed by: FAMILY MEDICINE

## 2023-09-14 PROCEDURE — 80048 BASIC METABOLIC PNL TOTAL CA: CPT | Performed by: OBSTETRICS & GYNECOLOGY

## 2023-09-14 PROCEDURE — 84156 ASSAY OF PROTEIN URINE: CPT | Performed by: FAMILY MEDICINE

## 2023-09-14 PROCEDURE — 99999 PR PBB SHADOW E&M-EST. PATIENT-LVL III: ICD-10-PCS | Mod: PBBFAC,,, | Performed by: FAMILY MEDICINE

## 2023-09-14 PROCEDURE — 0502F SUBSEQUENT PRENATAL CARE: CPT | Mod: CPTII,S$GLB,, | Performed by: FAMILY MEDICINE

## 2023-09-14 PROCEDURE — 84450 TRANSFERASE (AST) (SGOT): CPT | Performed by: FAMILY MEDICINE

## 2023-09-14 PROCEDURE — 36415 COLL VENOUS BLD VENIPUNCTURE: CPT | Performed by: OBSTETRICS & GYNECOLOGY

## 2023-09-14 PROCEDURE — 99999 PR PBB SHADOW E&M-EST. PATIENT-LVL III: CPT | Mod: PBBFAC,,, | Performed by: FAMILY MEDICINE

## 2023-09-14 PROCEDURE — 85025 COMPLETE CBC W/AUTO DIFF WBC: CPT | Performed by: FAMILY MEDICINE

## 2023-09-14 RX ORDER — POTASSIUM CHLORIDE 20 MEQ/1
40 TABLET, EXTENDED RELEASE ORAL DAILY
Qty: 4 TABLET | Refills: 0 | Status: SHIPPED | OUTPATIENT
Start: 2023-09-14 | End: 2023-09-16

## 2023-09-14 NOTE — PATIENT INSTRUCTIONS
LABOR AND DELIVERY PHONE NUMBER, [phone] (OPEN 24/7, LOCATED ON 6TH FLOOR OF HOSPITAL)  [address] PHONE NUMBER, [phone] (OPEN MON-FRI, 8a-5p)

## 2023-09-14 NOTE — PROGRESS NOTES
Reason for visit: Routine Prenatal Visit      HPI:   28 y.o., at 35w1d by Estimated Date of Delivery: 10/18/23    -thinks it is the iron making her vomit, she takes together with the pnv. Discussed to separate. Eat a small meal with it, nothing dairy. Can consider chewable or gummy iron also    - Contractions: infrequent BHC  - Bleeding: none  - Loss of fluid: none  - Fetal movement: present, discussed BID FMC  - Nausea: intermittent, none currently  - Vomiting: intermittent none currently  - Headache: mild intermittent, resolve with asa (she takes prn, discussed to take daily for preE prevention),not currently.  no blurry vision, ruq pain, NV with it. Leg swelling for past 2-3 weeks. High readings on connected mom      Reviewed:    Past medical, surgical, social, family, and obstetric history: Reviewed and updated in EMR.  Medications: Reviewed and updated in EMR.  Allergies: Patient has no known allergies.    Pregnancy dating, labs, ultrasound reports, prenatal testing, and problem list: Reviewed and updated in EMR.  Outside records: na  Independent interpretation of tests: na  Discussion with another healthcare professional: na      Vitals: BP (!) 138/90   Wt 68.5 kg (151 lb 0.2 oz)   LMP 2023   BMI 25.92 kg/m²     Physical exam:  GENERAL: No acute distress  ABD: Gravid      Assessment and Plan:    35 weeks gestation of pregnancy    Encounter for supervision of normal first pregnancy in third trimester         Initial bp 138/90 recheck after 15 min 148/92 (BMP was scheduled today, added preE labs). Discussed s/s and bps to call clinic or L&D for   Discussed gbs next week, 3T labs already done     labor/PreE precautions given  Follow-up: 1 week      I spent a total of 20 minutes on the day of the visit. This includes face to face time and non-face to face time preparing to see the patient (eg, review of tests), Obtaining and/or reviewing separately obtained history, Documenting clinical  information in the electronic or other health record, Independently interpreting results and communicating results to the patient/family/caregiver, or Care coordination.

## 2023-09-18 ENCOUNTER — PATIENT MESSAGE (OUTPATIENT)
Dept: OBSTETRICS AND GYNECOLOGY | Facility: CLINIC | Age: 28
End: 2023-09-18

## 2023-09-18 ENCOUNTER — LAB VISIT (OUTPATIENT)
Dept: LAB | Facility: OTHER | Age: 28
End: 2023-09-18
Attending: OBSTETRICS & GYNECOLOGY
Payer: COMMERCIAL

## 2023-09-18 ENCOUNTER — ROUTINE PRENATAL (OUTPATIENT)
Dept: OBSTETRICS AND GYNECOLOGY | Facility: CLINIC | Age: 28
End: 2023-09-18
Payer: COMMERCIAL

## 2023-09-18 VITALS — DIASTOLIC BLOOD PRESSURE: 92 MMHG | WEIGHT: 155 LBS | SYSTOLIC BLOOD PRESSURE: 136 MMHG | BODY MASS INDEX: 26.6 KG/M2

## 2023-09-18 DIAGNOSIS — E87.6 HYPOKALEMIA: ICD-10-CM

## 2023-09-18 DIAGNOSIS — O26.893 RH NEGATIVE STATE IN ANTEPARTUM PERIOD, THIRD TRIMESTER: ICD-10-CM

## 2023-09-18 DIAGNOSIS — O09.93 SUPERVISION OF HIGH-RISK PREGNANCY, THIRD TRIMESTER: Primary | ICD-10-CM

## 2023-09-18 DIAGNOSIS — O13.3 GESTATIONAL HYPERTENSION, THIRD TRIMESTER: ICD-10-CM

## 2023-09-18 DIAGNOSIS — Z67.91 RH NEGATIVE STATE IN ANTEPARTUM PERIOD, THIRD TRIMESTER: ICD-10-CM

## 2023-09-18 DIAGNOSIS — O26.849 UTERINE SIZE DATE DISCREPANCY PREGNANCY: ICD-10-CM

## 2023-09-18 DIAGNOSIS — Z3A.35 35 WEEKS GESTATION OF PREGNANCY: ICD-10-CM

## 2023-09-18 LAB
ALBUMIN SERPL BCP-MCNC: 2.8 G/DL (ref 3.5–5.2)
ALP SERPL-CCNC: 74 U/L (ref 55–135)
ALT SERPL W/O P-5'-P-CCNC: 18 U/L (ref 10–44)
ANION GAP SERPL CALC-SCNC: 11 MMOL/L (ref 8–16)
AST SERPL-CCNC: 20 U/L (ref 10–40)
BASOPHILS # BLD AUTO: 0.04 K/UL (ref 0–0.2)
BASOPHILS NFR BLD: 0.4 % (ref 0–1.9)
BILIRUB SERPL-MCNC: 0.3 MG/DL (ref 0.1–1)
BUN SERPL-MCNC: 5 MG/DL (ref 6–20)
CALCIUM SERPL-MCNC: 8.4 MG/DL (ref 8.7–10.5)
CHLORIDE SERPL-SCNC: 101 MMOL/L (ref 95–110)
CO2 SERPL-SCNC: 25 MMOL/L (ref 23–29)
CREAT SERPL-MCNC: 0.8 MG/DL (ref 0.5–1.4)
DIFFERENTIAL METHOD: ABNORMAL
EOSINOPHIL # BLD AUTO: 0.1 K/UL (ref 0–0.5)
EOSINOPHIL NFR BLD: 1.5 % (ref 0–8)
ERYTHROCYTE [DISTWIDTH] IN BLOOD BY AUTOMATED COUNT: 12.4 % (ref 11.5–14.5)
EST. GFR  (NO RACE VARIABLE): >60 ML/MIN/1.73 M^2
GLUCOSE SERPL-MCNC: 97 MG/DL (ref 70–110)
HCT VFR BLD AUTO: 30.9 % (ref 37–48.5)
HGB BLD-MCNC: 10.6 G/DL (ref 12–16)
IMM GRANULOCYTES # BLD AUTO: 0.07 K/UL (ref 0–0.04)
IMM GRANULOCYTES NFR BLD AUTO: 0.8 % (ref 0–0.5)
LYMPHOCYTES # BLD AUTO: 1.6 K/UL (ref 1–4.8)
LYMPHOCYTES NFR BLD: 17.7 % (ref 18–48)
MCH RBC QN AUTO: 30.2 PG (ref 27–31)
MCHC RBC AUTO-ENTMCNC: 34.3 G/DL (ref 32–36)
MCV RBC AUTO: 88 FL (ref 82–98)
MONOCYTES # BLD AUTO: 0.6 K/UL (ref 0.3–1)
MONOCYTES NFR BLD: 6.7 % (ref 4–15)
NEUTROPHILS # BLD AUTO: 6.6 K/UL (ref 1.8–7.7)
NEUTROPHILS NFR BLD: 72.9 % (ref 38–73)
NRBC BLD-RTO: 0 /100 WBC
PLATELET # BLD AUTO: 191 K/UL (ref 150–450)
PMV BLD AUTO: 11.6 FL (ref 9.2–12.9)
POTASSIUM SERPL-SCNC: 2.8 MMOL/L (ref 3.5–5.1)
PROT SERPL-MCNC: 6.3 G/DL (ref 6–8.4)
RBC # BLD AUTO: 3.51 M/UL (ref 4–5.4)
SODIUM SERPL-SCNC: 137 MMOL/L (ref 136–145)
WBC # BLD AUTO: 9.1 K/UL (ref 3.9–12.7)

## 2023-09-18 PROCEDURE — 36415 COLL VENOUS BLD VENIPUNCTURE: CPT | Performed by: OBSTETRICS & GYNECOLOGY

## 2023-09-18 PROCEDURE — 99999 PR PBB SHADOW E&M-EST. PATIENT-LVL III: CPT | Mod: PBBFAC,,, | Performed by: OBSTETRICS & GYNECOLOGY

## 2023-09-18 PROCEDURE — 99999 PR PBB SHADOW E&M-EST. PATIENT-LVL III: ICD-10-PCS | Mod: PBBFAC,,, | Performed by: OBSTETRICS & GYNECOLOGY

## 2023-09-18 PROCEDURE — 0502F SUBSEQUENT PRENATAL CARE: CPT | Mod: CPTII,S$GLB,, | Performed by: OBSTETRICS & GYNECOLOGY

## 2023-09-18 PROCEDURE — 82570 ASSAY OF URINE CREATININE: CPT | Performed by: OBSTETRICS & GYNECOLOGY

## 2023-09-18 PROCEDURE — 0502F PR SUBSEQUENT PRENATAL CARE: ICD-10-PCS | Mod: CPTII,S$GLB,, | Performed by: OBSTETRICS & GYNECOLOGY

## 2023-09-18 PROCEDURE — 85025 COMPLETE CBC W/AUTO DIFF WBC: CPT | Performed by: OBSTETRICS & GYNECOLOGY

## 2023-09-18 PROCEDURE — 80053 COMPREHEN METABOLIC PANEL: CPT | Performed by: OBSTETRICS & GYNECOLOGY

## 2023-09-18 NOTE — PROGRESS NOTES
@35w5d: Doing well, denies CTX, LOF, VB. +FM.   Significantly elevated BP on Connected Mom cuff. Brought in today for BP check.   Now meeting criteria for GHTN. Denies s/sx of severe PreE. Will get PreE labs and initiate twice weekly PNT, weekly visits and weekly labs with delivery next week.   GBS and cervix check on Friday.    RTC 1 week OB f/u. PTL/PPROM//PreE/FM precautions reviewed.

## 2023-09-19 ENCOUNTER — HOSPITAL ENCOUNTER (OUTPATIENT)
Dept: PERINATAL CARE | Facility: OTHER | Age: 28
Discharge: HOME | End: 2023-09-19
Attending: OBSTETRICS & GYNECOLOGY
Payer: COMMERCIAL

## 2023-09-19 DIAGNOSIS — O13.3 GESTATIONAL HYPERTENSION, THIRD TRIMESTER: ICD-10-CM

## 2023-09-19 DIAGNOSIS — O26.849 UTERINE SIZE DATE DISCREPANCY PREGNANCY: ICD-10-CM

## 2023-09-19 DIAGNOSIS — Z34.90 ENCOUNTER FOR INDUCTION OF LABOR: Primary | ICD-10-CM

## 2023-09-19 LAB
CREAT UR-MCNC: 101 MG/DL (ref 15–325)
PROT UR-MCNC: 11 MG/DL (ref 0–15)
PROT/CREAT UR: 0.11 MG/G{CREAT} (ref 0–0.2)

## 2023-09-19 PROCEDURE — 59025 FETAL NON-STRESS TEST: CPT

## 2023-09-19 PROCEDURE — 59025 FETAL NON-STRESS TEST: CPT | Mod: 26,,, | Performed by: OBSTETRICS & GYNECOLOGY

## 2023-09-19 PROCEDURE — 59025 PRENATAL TESTING - NST/AFI: ICD-10-PCS | Mod: 26,,, | Performed by: OBSTETRICS & GYNECOLOGY

## 2023-09-22 ENCOUNTER — ROUTINE PRENATAL (OUTPATIENT)
Dept: OBSTETRICS AND GYNECOLOGY | Facility: CLINIC | Age: 28
End: 2023-09-22
Payer: MEDICAID

## 2023-09-22 ENCOUNTER — HOSPITAL ENCOUNTER (OUTPATIENT)
Dept: PERINATAL CARE | Facility: OTHER | Age: 28
Discharge: HOME | End: 2023-09-22
Attending: OBSTETRICS & GYNECOLOGY
Payer: MEDICAID

## 2023-09-22 VITALS
DIASTOLIC BLOOD PRESSURE: 76 MMHG | SYSTOLIC BLOOD PRESSURE: 140 MMHG | WEIGHT: 154.56 LBS | BODY MASS INDEX: 26.53 KG/M2

## 2023-09-22 DIAGNOSIS — O13.3 GESTATIONAL HYPERTENSION, THIRD TRIMESTER: ICD-10-CM

## 2023-09-22 DIAGNOSIS — O09.93 SUPERVISION OF HIGH-RISK PREGNANCY, THIRD TRIMESTER: Primary | ICD-10-CM

## 2023-09-22 DIAGNOSIS — O26.893 RH NEGATIVE STATE IN ANTEPARTUM PERIOD, THIRD TRIMESTER: ICD-10-CM

## 2023-09-22 DIAGNOSIS — Z3A.36 36 WEEKS GESTATION OF PREGNANCY: ICD-10-CM

## 2023-09-22 DIAGNOSIS — Z67.91 RH NEGATIVE STATE IN ANTEPARTUM PERIOD, THIRD TRIMESTER: ICD-10-CM

## 2023-09-22 PROCEDURE — 87081 CULTURE SCREEN ONLY: CPT | Performed by: OBSTETRICS & GYNECOLOGY

## 2023-09-22 PROCEDURE — 99999 PR PBB SHADOW E&M-EST. PATIENT-LVL II: ICD-10-PCS | Mod: PBBFAC,,, | Performed by: OBSTETRICS & GYNECOLOGY

## 2023-09-22 PROCEDURE — 76819 FETAL BIOPHYS PROFIL W/O NST: CPT | Mod: 26,,, | Performed by: OBSTETRICS & GYNECOLOGY

## 2023-09-22 PROCEDURE — 99213 PR OFFICE/OUTPT VISIT, EST, LEVL III, 20-29 MIN: ICD-10-PCS | Mod: TH,S$PBB,, | Performed by: OBSTETRICS & GYNECOLOGY

## 2023-09-22 PROCEDURE — 99212 OFFICE O/P EST SF 10 MIN: CPT | Mod: PBBFAC,25,TH | Performed by: OBSTETRICS & GYNECOLOGY

## 2023-09-22 PROCEDURE — 99999 PR PBB SHADOW E&M-EST. PATIENT-LVL II: CPT | Mod: PBBFAC,,, | Performed by: OBSTETRICS & GYNECOLOGY

## 2023-09-22 PROCEDURE — 76819 PRENATAL TESTING - NST/AFI: ICD-10-PCS | Mod: 26,,, | Performed by: OBSTETRICS & GYNECOLOGY

## 2023-09-22 PROCEDURE — 99213 OFFICE O/P EST LOW 20 MIN: CPT | Mod: TH,S$PBB,, | Performed by: OBSTETRICS & GYNECOLOGY

## 2023-09-22 PROCEDURE — 76819 FETAL BIOPHYS PROFIL W/O NST: CPT

## 2023-09-22 NOTE — PROGRESS NOTES
@36w2d: Doing well, denies CTX, LOF, VB. +FM.   GHTN - continue twice weekly  testing, weekly labs, and plan for IOL next week  @ 5p. Cervix closed today.  No s/sx of PreE currently.  GBS pending, consents signed, interested in postplacental IUD, sign consents on L&D.  RTC 1 week OB f/u. PTL/PPROM/PreE/FM precautions reviewed.

## 2023-09-25 ENCOUNTER — HOSPITAL ENCOUNTER (OUTPATIENT)
Dept: PERINATAL CARE | Facility: OTHER | Age: 28
Discharge: HOME | End: 2023-09-25
Attending: OBSTETRICS & GYNECOLOGY
Payer: MEDICAID

## 2023-09-25 ENCOUNTER — ROUTINE PRENATAL (OUTPATIENT)
Dept: OBSTETRICS AND GYNECOLOGY | Facility: CLINIC | Age: 28
End: 2023-09-25
Payer: MEDICAID

## 2023-09-25 VITALS
BODY MASS INDEX: 26.22 KG/M2 | WEIGHT: 152.75 LBS | DIASTOLIC BLOOD PRESSURE: 96 MMHG | SYSTOLIC BLOOD PRESSURE: 142 MMHG

## 2023-09-25 DIAGNOSIS — Z3A.36 36 WEEKS GESTATION OF PREGNANCY: ICD-10-CM

## 2023-09-25 DIAGNOSIS — Z67.91 RH NEGATIVE STATE IN ANTEPARTUM PERIOD, THIRD TRIMESTER: ICD-10-CM

## 2023-09-25 DIAGNOSIS — O09.93 SUPERVISION OF HIGH-RISK PREGNANCY, THIRD TRIMESTER: Primary | ICD-10-CM

## 2023-09-25 DIAGNOSIS — O26.893 RH NEGATIVE STATE IN ANTEPARTUM PERIOD, THIRD TRIMESTER: ICD-10-CM

## 2023-09-25 DIAGNOSIS — O13.3 GESTATIONAL HYPERTENSION, THIRD TRIMESTER: ICD-10-CM

## 2023-09-25 DIAGNOSIS — O26.849 UTERINE SIZE DATE DISCREPANCY PREGNANCY: ICD-10-CM

## 2023-09-25 DIAGNOSIS — O13.3 GESTATIONAL HYPERTENSION, THIRD TRIMESTER: Primary | ICD-10-CM

## 2023-09-25 LAB
BACTERIA SPEC AEROBE CULT: NORMAL
CREAT UR-MCNC: 44 MG/DL (ref 15–325)
PROT UR-MCNC: <7 MG/DL (ref 0–15)
PROT/CREAT UR: NORMAL MG/G{CREAT} (ref 0–0.2)

## 2023-09-25 PROCEDURE — 99999 PR PBB SHADOW E&M-EST. PATIENT-LVL II: ICD-10-PCS | Mod: PBBFAC,,, | Performed by: OBSTETRICS & GYNECOLOGY

## 2023-09-25 PROCEDURE — 76819 FETAL BIOPHYS PROFIL W/O NST: CPT

## 2023-09-25 PROCEDURE — 99213 OFFICE O/P EST LOW 20 MIN: CPT | Mod: TH,S$PBB,, | Performed by: OBSTETRICS & GYNECOLOGY

## 2023-09-25 PROCEDURE — 82570 ASSAY OF URINE CREATININE: CPT | Performed by: OBSTETRICS & GYNECOLOGY

## 2023-09-25 PROCEDURE — 76819 PRENATAL TESTING - BPP: ICD-10-PCS | Mod: 26,,, | Performed by: OBSTETRICS & GYNECOLOGY

## 2023-09-25 PROCEDURE — 99213 PR OFFICE/OUTPT VISIT, EST, LEVL III, 20-29 MIN: ICD-10-PCS | Mod: TH,S$PBB,, | Performed by: OBSTETRICS & GYNECOLOGY

## 2023-09-25 PROCEDURE — 76819 FETAL BIOPHYS PROFIL W/O NST: CPT | Mod: 26,,, | Performed by: OBSTETRICS & GYNECOLOGY

## 2023-09-25 PROCEDURE — 99212 OFFICE O/P EST SF 10 MIN: CPT | Mod: PBBFAC,TH,25 | Performed by: OBSTETRICS & GYNECOLOGY

## 2023-09-25 PROCEDURE — 99999 PR PBB SHADOW E&M-EST. PATIENT-LVL II: CPT | Mod: PBBFAC,,, | Performed by: OBSTETRICS & GYNECOLOGY

## 2023-09-25 NOTE — PROGRESS NOTES
@36w5d: Doing well, denies CTX, LOF, VB. +FM.   GHTN - BP stable/mild range, increased BLE swelling and mild HA that improves with medication, but no signs of severe PreE today. Continue  testing until IOL scheduled  @ 0000. Will send urine for P:C.  Desires PP IUD - will need consents signed on L&D.  RTC PRN. PTL/PPROM/PreE/FM precautions reviewed.

## 2023-09-28 ENCOUNTER — ANESTHESIA (OUTPATIENT)
Dept: OBSTETRICS AND GYNECOLOGY | Facility: OTHER | Age: 28
End: 2023-09-28
Payer: MEDICAID

## 2023-09-28 ENCOUNTER — HOSPITAL ENCOUNTER (INPATIENT)
Facility: OTHER | Age: 28
LOS: 4 days | Discharge: HOME | End: 2023-10-02
Attending: OBSTETRICS & GYNECOLOGY | Admitting: OBSTETRICS & GYNECOLOGY
Payer: MEDICAID

## 2023-09-28 ENCOUNTER — ANESTHESIA EVENT (OUTPATIENT)
Dept: OBSTETRICS AND GYNECOLOGY | Facility: OTHER | Age: 28
End: 2023-09-28
Payer: MEDICAID

## 2023-09-28 ENCOUNTER — PATIENT MESSAGE (OUTPATIENT)
Dept: OBSTETRICS AND GYNECOLOGY | Facility: OTHER | Age: 28
End: 2023-09-28
Payer: MEDICAID

## 2023-09-28 DIAGNOSIS — Z34.90 ENCOUNTER FOR INDUCTION OF LABOR: ICD-10-CM

## 2023-09-28 DIAGNOSIS — O14.13 SEVERE PRE-ECLAMPSIA IN THIRD TRIMESTER: ICD-10-CM

## 2023-09-28 DIAGNOSIS — O26.849 UTERINE SIZE DATE DISCREPANCY PREGNANCY: ICD-10-CM

## 2023-09-28 DIAGNOSIS — O13.3 GESTATIONAL HYPERTENSION, THIRD TRIMESTER: ICD-10-CM

## 2023-09-28 DIAGNOSIS — Z34.90 ENCOUNTER FOR ELECTIVE INDUCTION OF LABOR: ICD-10-CM

## 2023-09-28 LAB
ABO + RH BLD: ABNORMAL
ALBUMIN SERPL BCP-MCNC: 2.7 G/DL (ref 3.5–5.2)
ALP SERPL-CCNC: 82 U/L (ref 55–135)
ALT SERPL W/O P-5'-P-CCNC: 22 U/L (ref 10–44)
ANION GAP SERPL CALC-SCNC: 13 MMOL/L (ref 8–16)
AST SERPL-CCNC: 25 U/L (ref 10–40)
BASOPHILS # BLD AUTO: 0.03 K/UL (ref 0–0.2)
BASOPHILS NFR BLD: 0.3 % (ref 0–1.9)
BILIRUB SERPL-MCNC: 0.4 MG/DL (ref 0.1–1)
BLD GP AB SCN CELLS X3 SERPL QL: ABNORMAL
BLOOD GROUP ANTIBODIES SERPL: NORMAL
BUN SERPL-MCNC: 4 MG/DL (ref 6–20)
CALCIUM SERPL-MCNC: 8.5 MG/DL (ref 8.7–10.5)
CHLORIDE SERPL-SCNC: 103 MMOL/L (ref 95–110)
CO2 SERPL-SCNC: 21 MMOL/L (ref 23–29)
CREAT SERPL-MCNC: 0.7 MG/DL (ref 0.5–1.4)
CREAT UR-MCNC: 66.7 MG/DL (ref 15–325)
DIFFERENTIAL METHOD: ABNORMAL
EOSINOPHIL # BLD AUTO: 0.1 K/UL (ref 0–0.5)
EOSINOPHIL NFR BLD: 1.5 % (ref 0–8)
ERYTHROCYTE [DISTWIDTH] IN BLOOD BY AUTOMATED COUNT: 12.7 % (ref 11.5–14.5)
EST. GFR  (NO RACE VARIABLE): >60 ML/MIN/1.73 M^2
GLUCOSE SERPL-MCNC: 93 MG/DL (ref 70–110)
HCT VFR BLD AUTO: 31.1 % (ref 37–48.5)
HGB BLD-MCNC: 10.9 G/DL (ref 12–16)
IMM GRANULOCYTES # BLD AUTO: 0.05 K/UL (ref 0–0.04)
IMM GRANULOCYTES NFR BLD AUTO: 0.6 % (ref 0–0.5)
LYMPHOCYTES # BLD AUTO: 1.7 K/UL (ref 1–4.8)
LYMPHOCYTES NFR BLD: 19 % (ref 18–48)
MCH RBC QN AUTO: 30 PG (ref 27–31)
MCHC RBC AUTO-ENTMCNC: 35 G/DL (ref 32–36)
MCV RBC AUTO: 86 FL (ref 82–98)
MONOCYTES # BLD AUTO: 0.5 K/UL (ref 0.3–1)
MONOCYTES NFR BLD: 6.1 % (ref 4–15)
NEUTROPHILS # BLD AUTO: 6.3 K/UL (ref 1.8–7.7)
NEUTROPHILS NFR BLD: 72.5 % (ref 38–73)
NRBC BLD-RTO: 0 /100 WBC
PLATELET # BLD AUTO: 205 K/UL (ref 150–450)
PMV BLD AUTO: 12.4 FL (ref 9.2–12.9)
POTASSIUM SERPL-SCNC: 2.9 MMOL/L (ref 3.5–5.1)
PROT SERPL-MCNC: 6.5 G/DL (ref 6–8.4)
PROT UR-MCNC: 9 MG/DL (ref 0–15)
PROT/CREAT UR: 0.13 MG/G{CREAT} (ref 0–0.2)
RBC # BLD AUTO: 3.63 M/UL (ref 4–5.4)
SODIUM SERPL-SCNC: 137 MMOL/L (ref 136–145)
SPECIMEN OUTDATE: ABNORMAL
WBC # BLD AUTO: 8.73 K/UL (ref 3.9–12.7)

## 2023-09-28 PROCEDURE — 85025 COMPLETE CBC W/AUTO DIFF WBC: CPT

## 2023-09-28 PROCEDURE — 63600175 PHARM REV CODE 636 W HCPCS

## 2023-09-28 PROCEDURE — 86870 RBC ANTIBODY IDENTIFICATION: CPT

## 2023-09-28 PROCEDURE — 63600175 PHARM REV CODE 636 W HCPCS: Performed by: STUDENT IN AN ORGANIZED HEALTH CARE EDUCATION/TRAINING PROGRAM

## 2023-09-28 PROCEDURE — 80053 COMPREHEN METABOLIC PANEL: CPT

## 2023-09-28 PROCEDURE — 25000003 PHARM REV CODE 250

## 2023-09-28 PROCEDURE — 86901 BLOOD TYPING SEROLOGIC RH(D): CPT

## 2023-09-28 PROCEDURE — 82570 ASSAY OF URINE CREATININE: CPT | Performed by: STUDENT IN AN ORGANIZED HEALTH CARE EDUCATION/TRAINING PROGRAM

## 2023-09-28 PROCEDURE — 11000001 HC ACUTE MED/SURG PRIVATE ROOM

## 2023-09-28 PROCEDURE — 25000003 PHARM REV CODE 250: Performed by: STUDENT IN AN ORGANIZED HEALTH CARE EDUCATION/TRAINING PROGRAM

## 2023-09-28 RX ORDER — MAGNESIUM SULFATE HEPTAHYDRATE 40 MG/ML
4 INJECTION, SOLUTION INTRAVENOUS ONCE
Status: COMPLETED | OUTPATIENT
Start: 2023-09-28 | End: 2023-09-28

## 2023-09-28 RX ORDER — CALCIUM GLUCONATE 98 MG/ML
1 INJECTION, SOLUTION INTRAVENOUS
Status: DISCONTINUED | OUTPATIENT
Start: 2023-09-28 | End: 2023-10-02 | Stop reason: HOSPADM

## 2023-09-28 RX ORDER — ACETAMINOPHEN 500 MG
1000 TABLET ORAL ONCE
Status: COMPLETED | OUTPATIENT
Start: 2023-09-28 | End: 2023-09-28

## 2023-09-28 RX ORDER — DIPHENOXYLATE HYDROCHLORIDE AND ATROPINE SULFATE 2.5; .025 MG/1; MG/1
2 TABLET ORAL EVERY 6 HOURS PRN
Status: DISCONTINUED | OUTPATIENT
Start: 2023-09-28 | End: 2023-09-30

## 2023-09-28 RX ORDER — SODIUM CHLORIDE, SODIUM LACTATE, POTASSIUM CHLORIDE, CALCIUM CHLORIDE 600; 310; 30; 20 MG/100ML; MG/100ML; MG/100ML; MG/100ML
INJECTION, SOLUTION INTRAVENOUS CONTINUOUS
Status: DISCONTINUED | OUTPATIENT
Start: 2023-09-28 | End: 2023-09-30

## 2023-09-28 RX ORDER — LIDOCAINE HYDROCHLORIDE 10 MG/ML
10 INJECTION INFILTRATION; PERINEURAL ONCE AS NEEDED
Status: DISCONTINUED | OUTPATIENT
Start: 2023-09-28 | End: 2023-09-30

## 2023-09-28 RX ORDER — OXYTOCIN/RINGER'S LACTATE 30/500 ML
0-30 PLASTIC BAG, INJECTION (ML) INTRAVENOUS CONTINUOUS
Status: DISCONTINUED | OUTPATIENT
Start: 2023-09-28 | End: 2023-09-30

## 2023-09-28 RX ORDER — OXYTOCIN/RINGER'S LACTATE 30/500 ML
95 PLASTIC BAG, INJECTION (ML) INTRAVENOUS ONCE
Status: DISCONTINUED | OUTPATIENT
Start: 2023-09-28 | End: 2023-09-30

## 2023-09-28 RX ORDER — MISOPROSTOL 200 UG/1
800 TABLET ORAL ONCE AS NEEDED
Status: COMPLETED | OUTPATIENT
Start: 2023-09-28 | End: 2023-09-30

## 2023-09-28 RX ORDER — OXYTOCIN/RINGER'S LACTATE 30/500 ML
95 PLASTIC BAG, INJECTION (ML) INTRAVENOUS ONCE AS NEEDED
Status: COMPLETED | OUTPATIENT
Start: 2023-09-28 | End: 2023-09-30

## 2023-09-28 RX ORDER — SODIUM CHLORIDE 9 MG/ML
INJECTION, SOLUTION INTRAVENOUS
Status: DISCONTINUED | OUTPATIENT
Start: 2023-09-28 | End: 2023-09-30

## 2023-09-28 RX ORDER — OXYTOCIN 10 [USP'U]/ML
10 INJECTION, SOLUTION INTRAMUSCULAR; INTRAVENOUS ONCE AS NEEDED
Status: DISCONTINUED | OUTPATIENT
Start: 2023-09-28 | End: 2023-09-30

## 2023-09-28 RX ORDER — MAGNESIUM SULFATE HEPTAHYDRATE 40 MG/ML
2 INJECTION, SOLUTION INTRAVENOUS CONTINUOUS
Status: DISCONTINUED | OUTPATIENT
Start: 2023-09-28 | End: 2023-10-01

## 2023-09-28 RX ORDER — CALCIUM CARBONATE 200(500)MG
500 TABLET,CHEWABLE ORAL 3 TIMES DAILY PRN
Status: DISCONTINUED | OUTPATIENT
Start: 2023-09-28 | End: 2023-09-30

## 2023-09-28 RX ORDER — HYDRALAZINE HYDROCHLORIDE 20 MG/ML
5 INJECTION INTRAMUSCULAR; INTRAVENOUS ONCE
Status: COMPLETED | OUTPATIENT
Start: 2023-09-28 | End: 2023-09-28

## 2023-09-28 RX ORDER — PROCHLORPERAZINE EDISYLATE 5 MG/ML
5 INJECTION INTRAMUSCULAR; INTRAVENOUS EVERY 6 HOURS PRN
Status: DISCONTINUED | OUTPATIENT
Start: 2023-09-28 | End: 2023-09-30

## 2023-09-28 RX ORDER — ONDANSETRON 8 MG/1
8 TABLET, ORALLY DISINTEGRATING ORAL EVERY 8 HOURS PRN
Status: DISCONTINUED | OUTPATIENT
Start: 2023-09-28 | End: 2023-09-30

## 2023-09-28 RX ORDER — OXYTOCIN/RINGER'S LACTATE 30/500 ML
334 PLASTIC BAG, INJECTION (ML) INTRAVENOUS ONCE
Status: DISCONTINUED | OUTPATIENT
Start: 2023-09-28 | End: 2023-09-30

## 2023-09-28 RX ORDER — TRANEXAMIC ACID 10 MG/ML
1000 INJECTION, SOLUTION INTRAVENOUS EVERY 30 MIN PRN
Status: DISCONTINUED | OUTPATIENT
Start: 2023-09-28 | End: 2023-09-30

## 2023-09-28 RX ORDER — METOCLOPRAMIDE HYDROCHLORIDE 5 MG/ML
10 INJECTION INTRAMUSCULAR; INTRAVENOUS ONCE
Status: COMPLETED | OUTPATIENT
Start: 2023-09-28 | End: 2023-09-28

## 2023-09-28 RX ORDER — SODIUM CHLORIDE, SODIUM LACTATE, POTASSIUM CHLORIDE, CALCIUM CHLORIDE 600; 310; 30; 20 MG/100ML; MG/100ML; MG/100ML; MG/100ML
INJECTION, SOLUTION INTRAVENOUS CONTINUOUS
Status: DISCONTINUED | OUTPATIENT
Start: 2023-09-28 | End: 2023-10-02 | Stop reason: HOSPADM

## 2023-09-28 RX ORDER — METHYLERGONOVINE MALEATE 0.2 MG/ML
200 INJECTION INTRAVENOUS ONCE AS NEEDED
Status: DISCONTINUED | OUTPATIENT
Start: 2023-09-28 | End: 2023-09-30

## 2023-09-28 RX ORDER — DIPHENHYDRAMINE HYDROCHLORIDE 50 MG/ML
25 INJECTION INTRAMUSCULAR; INTRAVENOUS ONCE
Status: COMPLETED | OUTPATIENT
Start: 2023-09-28 | End: 2023-09-28

## 2023-09-28 RX ORDER — ACETAMINOPHEN 500 MG
1000 TABLET ORAL ONCE
Status: DISCONTINUED | OUTPATIENT
Start: 2023-09-28 | End: 2023-09-29

## 2023-09-28 RX ORDER — CARBOPROST TROMETHAMINE 250 UG/ML
250 INJECTION, SOLUTION INTRAMUSCULAR
Status: DISCONTINUED | OUTPATIENT
Start: 2023-09-28 | End: 2023-09-30

## 2023-09-28 RX ORDER — MISOPROSTOL 200 UG/1
800 TABLET ORAL ONCE AS NEEDED
Status: DISCONTINUED | OUTPATIENT
Start: 2023-09-28 | End: 2023-09-30

## 2023-09-28 RX ORDER — OXYTOCIN/RINGER'S LACTATE 30/500 ML
334 PLASTIC BAG, INJECTION (ML) INTRAVENOUS ONCE AS NEEDED
Status: DISCONTINUED | OUTPATIENT
Start: 2023-09-28 | End: 2023-09-30

## 2023-09-28 RX ORDER — SIMETHICONE 80 MG
1 TABLET,CHEWABLE ORAL 4 TIMES DAILY PRN
Status: DISCONTINUED | OUTPATIENT
Start: 2023-09-28 | End: 2023-09-30 | Stop reason: SDUPTHER

## 2023-09-28 RX ADMIN — MAGNESIUM SULFATE HEPTAHYDRATE 2 G/HR: 40 INJECTION, SOLUTION INTRAVENOUS at 07:09

## 2023-09-28 RX ADMIN — HYDRALAZINE HYDROCHLORIDE 5 MG: 20 INJECTION INTRAMUSCULAR; INTRAVENOUS at 07:09

## 2023-09-28 RX ADMIN — Medication 4 MILLI-UNITS/MIN: at 11:09

## 2023-09-28 RX ADMIN — ACETAMINOPHEN 1000 MG: 500 TABLET ORAL at 11:09

## 2023-09-28 RX ADMIN — MAGNESIUM SULFATE HEPTAHYDRATE 4 G: 40 INJECTION, SOLUTION INTRAVENOUS at 07:09

## 2023-09-28 RX ADMIN — DIPHENHYDRAMINE HYDROCHLORIDE 25 MG: 50 INJECTION, SOLUTION INTRAMUSCULAR; INTRAVENOUS at 08:09

## 2023-09-28 RX ADMIN — METOCLOPRAMIDE 10 MG: 5 INJECTION, SOLUTION INTRAMUSCULAR; INTRAVENOUS at 08:09

## 2023-09-28 RX ADMIN — SODIUM CHLORIDE, POTASSIUM CHLORIDE, SODIUM LACTATE AND CALCIUM CHLORIDE: 600; 310; 30; 20 INJECTION, SOLUTION INTRAVENOUS at 05:09

## 2023-09-28 RX ADMIN — MISOPROSTOL 50 MCG: 100 TABLET ORAL at 06:09

## 2023-09-28 NOTE — H&P
HISTORY AND PHYSICAL                                                OBSTETRICS          Subjective:       Kika Johnson is a 28 y.o.  female with IUP at 37w1d weeks gestation who presents for induction of labor.    Patient denies contractions, denies vaginal bleeding, denies LOF.   Fetal Movement: normal.    This IUP is complicated by gHTN, Rh neg, anemia, abnormal 1hr.    Review of Systems   Constitutional:  Negative for chills and fever.   Respiratory:  Negative for shortness of breath.    Cardiovascular:  Negative for chest pain.   Gastrointestinal:  Negative for abdominal pain, nausea and vomiting.   Endocrine: Negative for hot flashes.   Genitourinary:  Negative for dysuria and vaginal bleeding.   Integumentary:  Negative for breast mass, nipple discharge and breast skin changes.   Neurological:  Negative for headaches.   Hematological:  Does not bruise/bleed easily.   Psychiatric/Behavioral:  Negative for depression.    Breast: Negative for mass, mastodynia, nipple discharge and skin changes      PMHx: No past medical history on file.    PSHx: No past surgical history on file.    All: Review of patient's allergies indicates:  No Known Allergies    Meds:   Medications Prior to Admission   Medication Sig Dispense Refill Last Dose    aspirin (ECOTRIN) 81 MG EC tablet Take 1 tablet (81 mg total) by mouth once daily. 60 tablet 3     docusate sodium (COLACE) 100 MG capsule Take 1 capsule (100 mg total) by mouth 2 (two) times daily. (Patient not taking: Reported on 2023) 60 capsule 3     famotidine (PEPCID) 20 MG tablet Take 1 tablet (20 mg total) by mouth 2 (two) times daily. (Patient not taking: Reported on 2023) 60 tablet 11     metoclopramide HCl (REGLAN) 10 MG tablet Take 1 tablet (10 mg total) by mouth every 8 (eight) hours as needed (nausea). Alternate with Zofran every 4 hours (Patient not taking: Reported on 2023) 90 tablet 1     nystatin-triamcinolone (MYCOLOG II) cream Apply  to affected area 2 times daily (Patient not taking: Reported on 2023) 30 g 1     ondansetron (ZOFRAN) 4 MG tablet Take 1 tablet (4 mg total) by mouth every 6 (six) hours as needed for Nausea. 30 tablet 1     ondansetron (ZOFRAN-ODT) 4 MG TbDL Take 1 tablet (4 mg total) by mouth every 6 (six) hours as needed (nausea). (Patient not taking: Reported on 2023) 30 tablet 3     -iron-FA-om-3s-dha-epa (VITAFOL GUMMIES) 3.33 mg iron- 0.33 mg Chew Take 1 each by mouth 3 (three) times daily. 90 tablet 6     promethazine (PHENERGAN) 25 MG tablet Take 1 tablet (25 mg total) by mouth every 8 (eight) hours as needed for Nausea. (Patient not taking: Reported on 2023) 30 tablet 6        SH:   Social History     Socioeconomic History    Marital status: Single   Tobacco Use    Smoking status: Never    Smokeless tobacco: Never   Substance and Sexual Activity    Alcohol use: Not Currently    Drug use: Not Currently     Types: Marijuana    Sexual activity: Not Currently       FH:   Family History   Problem Relation Age of Onset    Breast cancer Maternal Grandmother     Stomach cancer Father     Colon cancer Neg Hx     Ovarian cancer Neg Hx        OBHx:   OB History    Para Term  AB Living   1 0 0 0 0 0   SAB IAB Ectopic Multiple Live Births   0 0 0 0 0      # Outcome Date GA Lbr Lonnie/2nd Weight Sex Delivery Anes PTL Lv   1 Current                Objective:       LMP 2023   Breastfeeding No     There were no vitals filed for this visit.    General:   alert, appears stated age and cooperative, no apparent distress   HENT:  normocephalic, atraumatic   Eyes:  extraocular movements and conjunctivae normal   Neck:  supple, range of motion normal, no thyromegaly   Lungs:   no respiratory distress   Heart:   regular rate   Abdomen:  soft, non-tender, non-distended but gravid, no rebound or guarding    Extremities negative edema, negative erythema   FHT: 135, moderate BTBV, +accels, -decels;  Cat 1  (reassuring)                 TOCO: Irregular   Presentations: cephalic by ultrasound   Cervix:     Dilation: 0.5    Effacement: 40%    Station:  -4    Consistency: firm    Position: posterior   Sterile Speculum Exam: n/a    EFW by Leopold's: 6lb    Recent Growth Scan: 34w6d 2184g 12%    Lab Review  Blood Type A NEG  GBBS: negative  Rubella: Immune  RPR: non-reactive  HIV: negative  HepB: negative       Assessment:       37w1d weeks gestation here for IOL    Active Hospital Problems    Diagnosis  POA    *Encounter for elective induction of labor [Z34.90]  Not Applicable    Gestational hypertension, third trimester [O13.3]  Yes    Rh negative state in antepartum period, third trimester [O26.893, Z67.91]  Yes      Resolved Hospital Problems   No resolved problems to display.          Plan:   IOL   Risks, benefits, alternatives and possible complications have been discussed in detail with the patient.   - Consents signed and to chart  - Admit to Labor and Delivery unit   - Epidural per Anesthesia  - Draw CBC, T&S  - Notify Staff  - Begin induction with cytotec and scott balloon  - Recheck in 4 hrs or PRN  - EFW 6lb    2. Rh negative  - Rhogam workup postpartum    3. gHTN  - BP mild range on admit  - PreE labs to be collected  - Continue to closely monitor    4. Abnormal 1 hour glucose screen  - B  - Normal 3 hour test    5. Anemia  - Continue iron/colace    Post-Partum Hemorrhage risk - low    Chacho Azul M.D.  OB/GYN PGY- 4

## 2023-09-28 NOTE — ANESTHESIA PREPROCEDURE EVALUATION
[facility]  Anesthesia Pre-Operative Evaluation         Patient Name: Kika Johnson  YOB: 1995  MRN: [id number]    2023      Kika Johnson is a 28 y.o. female  @ 37w1d with history of anemia, gHTN, hypokalemia who presents for Induction of labor for: Elective at term. She denies history of DM, asthma, bleeding diathesis, or complications with anesthesia.       OB History    Para Term  AB Living   1             SAB IAB Ectopic Multiple Live Births                  # Outcome Date GA Lbr Lonnie/2nd Weight Sex Delivery Anes PTL Lv   1 Current                Review of patient's allergies indicates:  No Known Allergies    Wt Readings from Last 1 Encounters:   23 0853 69.3 kg (152 lb 12.5 oz)       BP Readings from Last 3 Encounters:   23 (!) 142/96   23 (!) 140/76   23 (!) 136/92       Patient Active Problem List   Diagnosis    Amenorrhea    Initial obstetric visit in first trimester    Rh negative state in antepartum period, third trimester    Encounter for supervision of normal first pregnancy in third trimester    Nausea/vomiting in pregnancy    Bacterial vaginosis    Uterine size date discrepancy pregnancy    Hypokalemia    Gestational hypertension, third trimester    Supervision of high-risk pregnancy, third trimester    Encounter for elective induction of labor       No past surgical history on file.    Social History     Socioeconomic History    Marital status: Single   Tobacco Use    Smoking status: Never    Smokeless tobacco: Never   Substance and Sexual Activity    Alcohol use: Not Currently    Drug use: Not Currently     Types: Marijuana    Sexual activity: Not Currently         Chemistry        Component Value Date/Time     2023 1625    K 2.8 (L) 2023 1625     2023 1625    CO2 25 2023 1625    BUN 5 (L) 2023 1625    CREATININE 0.8 2023 1625    GLU 97  "09/18/2023 1625        Component Value Date/Time    CALCIUM 8.4 (L) 09/18/2023 1625    ALKPHOS 74 09/18/2023 1625    AST 20 09/18/2023 1625    ALT 18 09/18/2023 1625    BILITOT 0.3 09/18/2023 1625            Lab Results   Component Value Date    WBC 9.10 09/18/2023    HGB 10.6 (L) 09/18/2023    HCT 30.9 (L) 09/18/2023    MCV 88 09/18/2023     09/18/2023       No results for input(s): "PT", "INR", "PROTIME", "APTT" in the last 72 hours.          Pre-op Assessment    I have reviewed the Patient Summary Reports.       I have reviewed the Medications.     Review of Systems  Anesthesia Hx:  Denies Family Hx of Anesthesia complications.   Denies Personal Hx of Anesthesia complications.       Physical Exam  General: Well nourished, Cooperative, Alert and Oriented    Airway:  Mallampati: III   Mouth Opening: Normal  TM Distance: Normal  Tongue: Normal  Neck ROM: Normal ROM    Dental:  Periodontal disease        Anesthesia Plan  Type of Anesthesia, risks & benefits discussed:    Anesthesia Type: Gen ETT, Epidural, Spinal  Intra-op Monitoring Plan: Standard ASA Monitors  Post Op Pain Control Plan: epidural analgesia, intrathecal opioid and multimodal analgesia  Induction:  IV  Airway Plan: Direct and Video, Post-Induction  Informed Consent: Informed consent signed with the Patient and all parties understand the risks and agree with anesthesia plan.  All questions answered.   ASA Score: 2  Day of Surgery Review of History & Physical: H&P Update referred to the surgeon/provider.    Ready For Surgery From Anesthesia Perspective.     .      "

## 2023-09-29 PROCEDURE — 72100002 HC LABOR CARE, 1ST 8 HOURS

## 2023-09-29 PROCEDURE — C1751 CATH, INF, PER/CENT/MIDLINE: HCPCS | Performed by: ANESTHESIOLOGY

## 2023-09-29 PROCEDURE — 62326 NJX INTERLAMINAR LMBR/SAC: CPT | Performed by: STUDENT IN AN ORGANIZED HEALTH CARE EDUCATION/TRAINING PROGRAM

## 2023-09-29 PROCEDURE — 59409 PRA ETRICAL CARE,VAG DELIV ONLY: ICD-10-PCS | Mod: AA,,, | Performed by: ANESTHESIOLOGY

## 2023-09-29 PROCEDURE — 51702 INSERT TEMP BLADDER CATH: CPT

## 2023-09-29 PROCEDURE — 27200710 HC EPIDURAL INFUSION PUMP SET: Performed by: ANESTHESIOLOGY

## 2023-09-29 PROCEDURE — 63600175 PHARM REV CODE 636 W HCPCS: Performed by: OBSTETRICS & GYNECOLOGY

## 2023-09-29 PROCEDURE — 63600175 PHARM REV CODE 636 W HCPCS

## 2023-09-29 PROCEDURE — 27000181 HC CABLE, IUPC

## 2023-09-29 PROCEDURE — 11000001 HC ACUTE MED/SURG PRIVATE ROOM

## 2023-09-29 PROCEDURE — 25000003 PHARM REV CODE 250: Performed by: OBSTETRICS & GYNECOLOGY

## 2023-09-29 PROCEDURE — 59409 OBSTETRICAL CARE: CPT | Mod: AA,,, | Performed by: ANESTHESIOLOGY

## 2023-09-29 PROCEDURE — 25000003 PHARM REV CODE 250: Performed by: STUDENT IN AN ORGANIZED HEALTH CARE EDUCATION/TRAINING PROGRAM

## 2023-09-29 RX ORDER — METOCLOPRAMIDE HYDROCHLORIDE 5 MG/ML
10 INJECTION INTRAMUSCULAR; INTRAVENOUS ONCE
Status: COMPLETED | OUTPATIENT
Start: 2023-09-29 | End: 2023-09-29

## 2023-09-29 RX ORDER — FAMOTIDINE 10 MG/ML
20 INJECTION INTRAVENOUS ONCE
Status: CANCELLED | OUTPATIENT
Start: 2023-09-29 | End: 2023-09-29

## 2023-09-29 RX ORDER — FENTANYL/BUPIVACAINE/NS/PF 2MCG/ML-.1
PLASTIC BAG, INJECTION (ML) INJECTION CONTINUOUS
Status: CANCELLED | OUTPATIENT
Start: 2023-09-29

## 2023-09-29 RX ORDER — LIDOCAINE HYDROCHLORIDE AND EPINEPHRINE 15; 5 MG/ML; UG/ML
INJECTION, SOLUTION EPIDURAL
Status: DISCONTINUED | OUTPATIENT
Start: 2023-09-29 | End: 2023-09-30

## 2023-09-29 RX ORDER — FENTANYL/BUPIVACAINE/NS/PF 2MCG/ML-.1
PLASTIC BAG, INJECTION (ML) INJECTION CONTINUOUS PRN
Status: DISCONTINUED | OUTPATIENT
Start: 2023-09-29 | End: 2023-09-30

## 2023-09-29 RX ORDER — FENTANYL/BUPIVACAINE/NS/PF 2MCG/ML-.1
PLASTIC BAG, INJECTION (ML) INJECTION
Status: COMPLETED
Start: 2023-09-29 | End: 2023-09-29

## 2023-09-29 RX ORDER — SODIUM CITRATE AND CITRIC ACID MONOHYDRATE 334; 500 MG/5ML; MG/5ML
30 SOLUTION ORAL ONCE
Status: CANCELLED | OUTPATIENT
Start: 2023-09-29 | End: 2023-09-29

## 2023-09-29 RX ORDER — DIPHENHYDRAMINE HYDROCHLORIDE 50 MG/ML
25 INJECTION INTRAMUSCULAR; INTRAVENOUS ONCE
Status: COMPLETED | OUTPATIENT
Start: 2023-09-29 | End: 2023-09-29

## 2023-09-29 RX ORDER — DIPHENHYDRAMINE HYDROCHLORIDE 50 MG/ML
12.5 INJECTION INTRAMUSCULAR; INTRAVENOUS EVERY 4 HOURS PRN
Status: CANCELLED | OUTPATIENT
Start: 2023-09-29

## 2023-09-29 RX ORDER — ACETAMINOPHEN 500 MG
1000 TABLET ORAL ONCE
Status: COMPLETED | OUTPATIENT
Start: 2023-09-29 | End: 2023-09-29

## 2023-09-29 RX ADMIN — DIPHENHYDRAMINE HYDROCHLORIDE 25 MG: 50 INJECTION, SOLUTION INTRAMUSCULAR; INTRAVENOUS at 08:09

## 2023-09-29 RX ADMIN — Medication 30 MILLI-UNITS/MIN: at 11:09

## 2023-09-29 RX ADMIN — MAGNESIUM SULFATE HEPTAHYDRATE 2 G/HR: 40 INJECTION, SOLUTION INTRAVENOUS at 02:09

## 2023-09-29 RX ADMIN — Medication 8 ML/HR: at 06:09

## 2023-09-29 RX ADMIN — METOCLOPRAMIDE 10 MG: 5 INJECTION, SOLUTION INTRAMUSCULAR; INTRAVENOUS at 08:09

## 2023-09-29 RX ADMIN — LIDOCAINE HYDROCHLORIDE,EPINEPHRINE BITARTRATE 3 ML: 15; .005 INJECTION, SOLUTION EPIDURAL; INFILTRATION; INTRACAUDAL; PERINEURAL at 06:09

## 2023-09-29 RX ADMIN — BUPIVACAINE HYDROCHLORIDE 8 ML: 2.5 INJECTION, SOLUTION EPIDURAL; INFILTRATION; INTRACAUDAL; PERINEURAL at 06:09

## 2023-09-29 RX ADMIN — SODIUM CHLORIDE, POTASSIUM CHLORIDE, SODIUM LACTATE AND CALCIUM CHLORIDE: 600; 310; 30; 20 INJECTION, SOLUTION INTRAVENOUS at 08:09

## 2023-09-29 RX ADMIN — METOCLOPRAMIDE 10 MG: 5 INJECTION, SOLUTION INTRAMUSCULAR; INTRAVENOUS at 12:09

## 2023-09-29 RX ADMIN — ACETAMINOPHEN 1000 MG: 500 TABLET ORAL at 11:09

## 2023-09-29 RX ADMIN — DIPHENHYDRAMINE HYDROCHLORIDE 25 MG: 50 INJECTION, SOLUTION INTRAMUSCULAR; INTRAVENOUS at 12:09

## 2023-09-29 NOTE — PLAN OF CARE
Patient has been screened for Social Work discharge planning needs. Based on documentation in medical record, no discharge planning needs are anticipated at this time. Should any discharge planning needs arise, please consult .        23 3528   OB SCREEN   Assessment Type Discharge Planning Assessment   Source of Information health record   Received Prenatal Care Yes   Any indications/suspicions for None   Is this a teen pregnancy No   Is the baby in NICU No   Indication for adoption/Safe Haven No   Indication for DME/post-acute needs No   HIV (+) No   Any congenital  disorders No   Fetal demise/ death No

## 2023-09-29 NOTE — CARE UPDATE
Resident to bedside for discussion regarding severely elevated blood pressures.    Patient reports a 6/10 headache, but denies changes in vision, chest pain, shortness of breath, RUQ pain, or edema.  IV hydralazine 5mg administered and patient will be started on IV MgSO4 for seizure prophylaxis. Tylenol 1g ordered for headache, if does not resolve will treat with Reglan/Benadryl.  Patient voices understanding and in agreement with plan.    Chacho Azul M.D.  OB/GYN PGY- 4

## 2023-09-29 NOTE — PROGRESS NOTES
"LABOR NOTE [DELAYED ENTRY}    S:  MD to bedside for routine cervical exam. Epidural working:  not applicable  No pt concerns at this time    O: /84   Pulse 77   Temp 98 °F (36.7 °C) (Oral)   Resp 18   Ht 5' 4" (1.626 m)   Wt 69.3 kg (152 lb 12.5 oz)   LMP 01/13/2023   SpO2 99%   Breastfeeding No   BMI 26.22 kg/m²     FHT: 150 bpm, moderate variability, +accels, +decels, Cat 2 (generally reassuring)  CTX: q 3-4 minutes, pit @ 2  SVE: 2/50/-3    TIMELINE:  2230: 1/50/-3, scott balloon placed  0300: 2/50/-3, scott balloon firmly in place    PLAN:    Continue Close Maternal/Fetal Monitoring  Pitocin Augmentation per protocol  Recheck 4 hours or PRN      Christopher Mcgarry MD MS  OB/Gyn  PGY-1      "

## 2023-09-29 NOTE — PROGRESS NOTES
"LABOR NOTE    S:  MD to bedside for routine cervical exam. Epidural working:  not applicable  No pt concerns at this time    O: /75   Pulse 77   Temp 97.7 °F (36.5 °C) (Oral)   Resp 16   Ht 5' 4" (1.626 m)   Wt 69.3 kg (152 lb 12.5 oz)   LMP 01/13/2023   SpO2 100%   Breastfeeding No   BMI 26.22 kg/m²     FHT: 115 bpm, moderate variability, +accels, - decels, Cat 1 (overall reassuring)  CTX: difficult to discern on TOCO, pit @ 20  SVE: 2/50/-3    TIMELINE:  2230: 1/50/-3, scott balloon placed  0300: 2/50/-3, scott balloon firmly in place  0830: 2/50/-3, scott remains in place    PreE w/ SF  - BP: (119-173)/(71-95) 134/75   - Continue Magnesium  - Patient asymptomatic at this time  - Will continue to monitor    PLAN:    Continue Close Maternal/Fetal Monitoring  Pitocin Augmentation per protocol  Recheck 4 hours or PRN    Sandrine Shetty MD  Obstetrics and Gynecology - PGY1  "

## 2023-09-29 NOTE — PROGRESS NOTES
"LABOR NOTE    S:  MD to bedside for routine cervical exam.  Comfortable on pitocin with scott balloon in place.    O: BP (!) 154/93   Pulse 91   Temp 98 °F (36.7 °C) (Oral)   Resp 16   Ht 5' 4" (1.626 m)   Wt 69.3 kg (152 lb 12.5 oz)   LMP 01/13/2023   SpO2 100%   Breastfeeding No   BMI 26.22 kg/m²   -200 ml/hr    Aox3, NAD  Unlabored breathing  2+ DTRs    FHT: 120 bpm, moderate variability, +accels, nodecels, Cat 1 (reassuring)  CTX: q 3 minutes, pit @ 30  SVE: 3/30/-3    Scott balloon deflated and removed.    PLAN:  Continue Close Maternal/Fetal Monitoring  Pitocin Augmentation per protocol  Recheck 4 hours or PRN  Place SCDs for VTE ppx  Continue magnesium sulfate for seizure ppx, continue to monitor for signs and symptoms of toxicity        Elsa Aranda MD  [facility]  09/29/2023    "

## 2023-09-29 NOTE — PROGRESS NOTES
"LABOR NOTE    S:  MD to bedside for routine cervical exam. Epidural working:  not applicable  No pt concerns at this time    O: /83   Pulse 79   Temp 98.8 °F (37.1 °C) (Oral)   Resp 18   Ht 5' 4" (1.626 m)   Wt 69.3 kg (152 lb 12.5 oz)   LMP 01/13/2023   SpO2 100%   Breastfeeding No   BMI 26.22 kg/m²     FHT: 160 bpm, moderate variability, +accels, -decels, Cat 1 (reassuring)  CTX: q 2 minutes  SVE: 1/50/-3    TIMELINE:  2230: 1/50/-3, scott balloon placed    PLAN:    Continue Close Maternal/Fetal Monitoring  Pitocin Augmentation per protocol  Recheck 4 hours or PRN    Christopher Mcgarry MD MS  OB/Gyn  PGY-1        "

## 2023-09-29 NOTE — PROGRESS NOTES
"LABOR NOTE    S:  MD to bedside for routine cervical exam.  Comfortable on pitocin.    O: /66   Pulse 80   Temp 98 °F (36.7 °C) (Oral)   Resp 16   Ht 5' 4" (1.626 m)   Wt 69.3 kg (152 lb 12.5 oz)   LMP 01/13/2023   SpO2 100%   Breastfeeding No   BMI 26.22 kg/m²   -200 ml/hr    Aox3, NAD  Unlabored breathing    FHT: 130 bpm, moderate variability, +accels, nodecels, Cat 1 (reassuring)  CTX: q 3 minutes, pit @ 30  SVE: 3/70/-2    TIMELINE:  2230: 1/50/-3, scott balloon placed  0300: 2/50/-3, scott balloon firmly in place  0830: 2/50/-3, scott remains in place  1245: 3/30/-3, scott out  1815: 3/70/-2, pit @ 30    PLAN:  Continue Close Maternal/Fetal Monitoring  Pitocin Augmentation per protocol  Recheck 4 hours or PRN  Place SCDs for VTE ppx  Continue magnesium sulfate for seizure ppx, continue to monitor for signs and symptoms of toxicity  Will attempt AROM after epidural placement.      Alethea Montejo MD/MPH  OB/GYN PGY4      "

## 2023-09-29 NOTE — ANESTHESIA PROCEDURE NOTES
Epidural    Patient location during procedure: OB   Reason for block: primary anesthetic   Reason for block: labor analgesia requested by patient and obstetrician  Diagnosis: IUP   Start time: 9/29/2023 6:25 PM  Timeout: 9/29/2023 6:25 PM  End time: 9/29/2023 6:35 PM    Staffing  Performing Provider: Dennis Farias MD  Authorizing Provider: Irineo Barclay Jr., MD    Staffing  Performed by: Dennis Farias MD  Authorized by: Irineo Barclay Jr., MD        Preanesthetic Checklist  Completed: patient identified, IV checked, site marked, risks and benefits discussed, surgical consent, monitors and equipment checked, pre-op evaluation, timeout performed, anesthesia consent given, hand hygiene performed and patient being monitored  Preparation  Patient position: sitting  Prep: ChloraPrep  Patient monitoring: Blood Pressure and Pulse Ox  Reason for block: primary anesthetic   Epidural  Skin Anesthetic: lidocaine 1%  Administration type: single shot  Approach: midline  Interspace: L4-5    Injection technique: ASHLEY air  Needle and Epidural Catheter  Needle type: Tuohy   Needle gauge: 17  Needle length: 3.5 inches  Needle insertion depth: 5 cm  Catheter type: springwShout For Good  Catheter size: 19 G  Catheter at skin depth: 9 cm  Insertion Attempts: 1  Test dose: 3 mL of lidocaine 1.5% with Epi 1-to-200,000  Additional Documentation: incremental injection, no paresthesia on injection, no significant pain on injection, negative aspiration for heme and CSF, no signs/symptoms of IV or SA injection and no significant complaints from patient  Needle localization: anatomical landmarks  Assessment  Ease of block: easy  Patient's tolerance of the procedure: comfortable throughout block and no complaints No inadvertent dural puncture with Tuohy.  Dural puncture performed with spinal needle.

## 2023-09-29 NOTE — PLAN OF CARE
Problem: Adult Inpatient Plan of Care  Goal: Plan of Care Review  Outcome: Ongoing, Progressing     Problem: Adult Inpatient Plan of Care  Goal: Patient-Specific Goal (Individualized)  Outcome: Ongoing, Progressing     Problem: Adult Inpatient Plan of Care  Goal: Absence of Hospital-Acquired Illness or Injury  Outcome: Ongoing, Progressing     Problem: Adult Inpatient Plan of Care  Goal: Optimal Comfort and Wellbeing  Outcome: Ongoing, Progressing     Problem:  Fall Injury Risk  Goal: Absence of Fall, Infant Drop and Related Injury  Outcome: Ongoing, Progressing     Problem: Labor Pain (Labor)  Goal: Acceptable Pain Control  Outcome: Ongoing, Progressing

## 2023-09-30 PROBLEM — O14.13 SEVERE PRE-ECLAMPSIA IN THIRD TRIMESTER: Status: ACTIVE | Noted: 2023-09-18

## 2023-09-30 PROBLEM — Z34.90 ENCOUNTER FOR ELECTIVE INDUCTION OF LABOR: Status: RESOLVED | Noted: 2023-09-28 | Resolved: 2023-09-30

## 2023-09-30 PROCEDURE — 63600175 PHARM REV CODE 636 W HCPCS: Performed by: GENERAL PRACTICE

## 2023-09-30 PROCEDURE — 11000001 HC ACUTE MED/SURG PRIVATE ROOM

## 2023-09-30 PROCEDURE — 63600175 PHARM REV CODE 636 W HCPCS

## 2023-09-30 PROCEDURE — 72100003 HC LABOR CARE, EA. ADDL. 8 HRS

## 2023-09-30 PROCEDURE — 63600175 PHARM REV CODE 636 W HCPCS: Performed by: ANESTHESIOLOGY

## 2023-09-30 PROCEDURE — 59409 OBSTETRICAL CARE: CPT | Mod: AT,,, | Performed by: OBSTETRICS & GYNECOLOGY

## 2023-09-30 PROCEDURE — 25000003 PHARM REV CODE 250

## 2023-09-30 PROCEDURE — 59409 PR OBSTETRICAL CARE,VAG DELIV ONLY: ICD-10-PCS | Mod: AT,,, | Performed by: OBSTETRICS & GYNECOLOGY

## 2023-09-30 PROCEDURE — 58300 INSERT INTRAUTERINE DEVICE: CPT | Performed by: OBSTETRICS & GYNECOLOGY

## 2023-09-30 PROCEDURE — 63600175 PHARM REV CODE 636 W HCPCS: Performed by: STUDENT IN AN ORGANIZED HEALTH CARE EDUCATION/TRAINING PROGRAM

## 2023-09-30 PROCEDURE — 25000003 PHARM REV CODE 250: Performed by: STUDENT IN AN ORGANIZED HEALTH CARE EDUCATION/TRAINING PROGRAM

## 2023-09-30 PROCEDURE — 72200006 HC VAGINAL DELIVERY LEVEL III

## 2023-09-30 RX ORDER — MISOPROSTOL 200 UG/1
800 TABLET ORAL ONCE AS NEEDED
Status: DISCONTINUED | OUTPATIENT
Start: 2023-09-30 | End: 2023-10-02 | Stop reason: HOSPADM

## 2023-09-30 RX ORDER — IBUPROFEN 600 MG/1
600 TABLET ORAL EVERY 6 HOURS
Status: DISCONTINUED | OUTPATIENT
Start: 2023-09-30 | End: 2023-10-02 | Stop reason: HOSPADM

## 2023-09-30 RX ORDER — ONDANSETRON 8 MG/1
8 TABLET, ORALLY DISINTEGRATING ORAL EVERY 8 HOURS PRN
Status: DISCONTINUED | OUTPATIENT
Start: 2023-09-30 | End: 2023-10-02 | Stop reason: HOSPADM

## 2023-09-30 RX ORDER — PRENATAL WITH FERROUS FUM AND FOLIC ACID 3080; 920; 120; 400; 22; 1.84; 3; 20; 10; 1; 12; 200; 27; 25; 2 [IU]/1; [IU]/1; MG/1; [IU]/1; MG/1; MG/1; MG/1; MG/1; MG/1; MG/1; UG/1; MG/1; MG/1; MG/1; MG/1
1 TABLET ORAL DAILY
Status: DISCONTINUED | OUTPATIENT
Start: 2023-09-30 | End: 2023-10-02 | Stop reason: HOSPADM

## 2023-09-30 RX ORDER — HYDROCORTISONE 25 MG/G
CREAM TOPICAL 3 TIMES DAILY PRN
Status: DISCONTINUED | OUTPATIENT
Start: 2023-09-30 | End: 2023-10-02 | Stop reason: HOSPADM

## 2023-09-30 RX ORDER — DIPHENHYDRAMINE HYDROCHLORIDE 50 MG/ML
25 INJECTION INTRAMUSCULAR; INTRAVENOUS EVERY 4 HOURS PRN
Status: DISCONTINUED | OUTPATIENT
Start: 2023-09-30 | End: 2023-10-02 | Stop reason: HOSPADM

## 2023-09-30 RX ORDER — LABETALOL HYDROCHLORIDE 5 MG/ML
20 INJECTION, SOLUTION INTRAVENOUS ONCE
Status: COMPLETED | OUTPATIENT
Start: 2023-09-30 | End: 2023-09-30

## 2023-09-30 RX ORDER — HYDROCODONE BITARTRATE AND ACETAMINOPHEN 10; 325 MG/1; MG/1
1 TABLET ORAL EVERY 4 HOURS PRN
Status: DISCONTINUED | OUTPATIENT
Start: 2023-09-30 | End: 2023-10-02 | Stop reason: HOSPADM

## 2023-09-30 RX ORDER — SODIUM CHLORIDE 0.9 % (FLUSH) 0.9 %
10 SYRINGE (ML) INJECTION
Status: DISCONTINUED | OUTPATIENT
Start: 2023-09-30 | End: 2023-10-02 | Stop reason: HOSPADM

## 2023-09-30 RX ORDER — OXYTOCIN/RINGER'S LACTATE 30/500 ML
95 PLASTIC BAG, INJECTION (ML) INTRAVENOUS ONCE
Status: DISCONTINUED | OUTPATIENT
Start: 2023-09-30 | End: 2023-10-02 | Stop reason: HOSPADM

## 2023-09-30 RX ORDER — DIPHENHYDRAMINE HYDROCHLORIDE 50 MG/ML
25 INJECTION INTRAMUSCULAR; INTRAVENOUS ONCE
Status: COMPLETED | OUTPATIENT
Start: 2023-09-30 | End: 2023-09-30

## 2023-09-30 RX ORDER — OXYTOCIN/RINGER'S LACTATE 30/500 ML
334 PLASTIC BAG, INJECTION (ML) INTRAVENOUS ONCE AS NEEDED
Status: DISCONTINUED | OUTPATIENT
Start: 2023-09-30 | End: 2023-10-02 | Stop reason: HOSPADM

## 2023-09-30 RX ORDER — TRANEXAMIC ACID 10 MG/ML
1000 INJECTION, SOLUTION INTRAVENOUS EVERY 30 MIN PRN
Status: DISCONTINUED | OUTPATIENT
Start: 2023-09-30 | End: 2023-10-02 | Stop reason: HOSPADM

## 2023-09-30 RX ORDER — DOCUSATE SODIUM 100 MG/1
200 CAPSULE, LIQUID FILLED ORAL 2 TIMES DAILY PRN
Status: DISCONTINUED | OUTPATIENT
Start: 2023-09-30 | End: 2023-10-02 | Stop reason: HOSPADM

## 2023-09-30 RX ORDER — OXYTOCIN/RINGER'S LACTATE 30/500 ML
0-40 PLASTIC BAG, INJECTION (ML) INTRAVENOUS CONTINUOUS
Status: DISCONTINUED | OUTPATIENT
Start: 2023-09-30 | End: 2023-09-30

## 2023-09-30 RX ORDER — ACETAMINOPHEN 325 MG/1
650 TABLET ORAL EVERY 6 HOURS PRN
Status: DISCONTINUED | OUTPATIENT
Start: 2023-09-30 | End: 2023-10-02 | Stop reason: HOSPADM

## 2023-09-30 RX ORDER — HYDROCODONE BITARTRATE AND ACETAMINOPHEN 5; 325 MG/1; MG/1
1 TABLET ORAL EVERY 4 HOURS PRN
Status: DISCONTINUED | OUTPATIENT
Start: 2023-09-30 | End: 2023-10-02 | Stop reason: HOSPADM

## 2023-09-30 RX ORDER — METHYLERGONOVINE MALEATE 0.2 MG/ML
200 INJECTION INTRAVENOUS ONCE AS NEEDED
Status: DISCONTINUED | OUTPATIENT
Start: 2023-09-30 | End: 2023-10-02 | Stop reason: HOSPADM

## 2023-09-30 RX ORDER — SIMETHICONE 80 MG
1 TABLET,CHEWABLE ORAL EVERY 6 HOURS PRN
Status: DISCONTINUED | OUTPATIENT
Start: 2023-09-30 | End: 2023-10-02 | Stop reason: HOSPADM

## 2023-09-30 RX ORDER — OXYTOCIN 10 [USP'U]/ML
10 INJECTION, SOLUTION INTRAMUSCULAR; INTRAVENOUS ONCE AS NEEDED
Status: DISCONTINUED | OUTPATIENT
Start: 2023-09-30 | End: 2023-10-02 | Stop reason: HOSPADM

## 2023-09-30 RX ORDER — DIPHENOXYLATE HYDROCHLORIDE AND ATROPINE SULFATE 2.5; .025 MG/1; MG/1
2 TABLET ORAL EVERY 6 HOURS PRN
Status: DISCONTINUED | OUTPATIENT
Start: 2023-09-30 | End: 2023-10-02 | Stop reason: HOSPADM

## 2023-09-30 RX ORDER — OXYTOCIN/RINGER'S LACTATE 30/500 ML
95 PLASTIC BAG, INJECTION (ML) INTRAVENOUS ONCE AS NEEDED
Status: DISCONTINUED | OUTPATIENT
Start: 2023-09-30 | End: 2023-10-02 | Stop reason: HOSPADM

## 2023-09-30 RX ORDER — BUPIVACAINE HYDROCHLORIDE 2.5 MG/ML
INJECTION, SOLUTION INFILTRATION; PERINEURAL
Status: DISCONTINUED | OUTPATIENT
Start: 2023-09-30 | End: 2023-09-30

## 2023-09-30 RX ORDER — PROCHLORPERAZINE EDISYLATE 5 MG/ML
5 INJECTION INTRAMUSCULAR; INTRAVENOUS EVERY 6 HOURS PRN
Status: DISCONTINUED | OUTPATIENT
Start: 2023-09-30 | End: 2023-10-02 | Stop reason: HOSPADM

## 2023-09-30 RX ORDER — CARBOPROST TROMETHAMINE 250 UG/ML
250 INJECTION, SOLUTION INTRAMUSCULAR
Status: DISCONTINUED | OUTPATIENT
Start: 2023-09-30 | End: 2023-10-02 | Stop reason: HOSPADM

## 2023-09-30 RX ORDER — DIPHENHYDRAMINE HCL 25 MG
25 CAPSULE ORAL EVERY 4 HOURS PRN
Status: DISCONTINUED | OUTPATIENT
Start: 2023-09-30 | End: 2023-10-02 | Stop reason: HOSPADM

## 2023-09-30 RX ADMIN — MAGNESIUM SULFATE HEPTAHYDRATE 2 G/HR: 40 INJECTION, SOLUTION INTRAVENOUS at 09:09

## 2023-09-30 RX ADMIN — Medication 34 MILLI-UNITS/MIN: at 12:09

## 2023-09-30 RX ADMIN — DIPHENHYDRAMINE HYDROCHLORIDE 25 MG: 50 INJECTION, SOLUTION INTRAMUSCULAR; INTRAVENOUS at 04:09

## 2023-09-30 RX ADMIN — Medication 95 MILLI-UNITS/MIN: at 08:09

## 2023-09-30 RX ADMIN — LEVONORGESTREL 1 INTRA UTERINE DEVICE: 52 INTRAUTERINE DEVICE INTRAUTERINE at 08:09

## 2023-09-30 RX ADMIN — MISOPROSTOL 800 MCG: 200 TABLET ORAL at 08:09

## 2023-09-30 RX ADMIN — IBUPROFEN 600 MG: 600 TABLET, FILM COATED ORAL at 07:09

## 2023-09-30 RX ADMIN — IBUPROFEN 600 MG: 600 TABLET, FILM COATED ORAL at 11:09

## 2023-09-30 RX ADMIN — LABETALOL HYDROCHLORIDE 20 MG: 5 INJECTION INTRAVENOUS at 09:09

## 2023-09-30 RX ADMIN — BUPIVACAINE HYDROCHLORIDE 6 ML: 2.5 INJECTION, SOLUTION INFILTRATION; PERINEURAL at 07:09

## 2023-09-30 RX ADMIN — SIMETHICONE 80 MG: 80 TABLET, CHEWABLE ORAL at 07:09

## 2023-09-30 RX ADMIN — ONDANSETRON 8 MG: 8 TABLET, ORALLY DISINTEGRATING ORAL at 05:09

## 2023-09-30 RX ADMIN — PRENATAL VIT W/ FE FUMARATE-FA TAB 27-0.8 MG 1 TABLET: 27-0.8 TAB at 10:09

## 2023-09-30 NOTE — CARE UPDATE
Resident to bedside for SVE. SVE 10/100/+1. Will set up for delivery. Anticipate . Category I tracing. Will continue magnesium for 24 hours postpartum    Gayathri Bowman MD  PGY-3 OB/GYN

## 2023-09-30 NOTE — PLAN OF CARE
Problem: Adult Inpatient Plan of Care  Goal: Plan of Care Review  Outcome: Ongoing, Progressing  Goal: Patient-Specific Goal (Individualized)  Outcome: Ongoing, Progressing  Goal: Absence of Hospital-Acquired Illness or Injury  Outcome: Ongoing, Progressing  Goal: Optimal Comfort and Wellbeing  Outcome: Ongoing, Progressing  Goal: Readiness for Transition of Care  Outcome: Ongoing, Progressing     Problem:  Fall Injury Risk  Goal: Absence of Fall, Infant Drop and Related Injury  Outcome: Ongoing, Progressing     Problem: Bleeding (Labor)  Goal: Hemostasis  Outcome: Met     Problem: Change in Fetal Wellbeing (Labor)  Goal: Stable Fetal Wellbeing  Outcome: Met     Problem: Delayed Labor Progression (Labor)  Goal: Effective Progression to Delivery  Outcome: Met     Problem: Infection (Labor)  Goal: Absence of Infection Signs and Symptoms  Outcome: Met     Problem: Labor Pain (Labor)  Goal: Acceptable Pain Control  Outcome: Met     Problem: Uterine Tachysystole (Labor)  Goal: Normal Uterine Contraction Pattern  Outcome: Met     Problem: Infection  Goal: Absence of Infection Signs and Symptoms  Outcome: Ongoing, Progressing     Problem: Hypertensive Disorders in Pregnancy  Goal: Maternal-Fetal Stabilization  Outcome: Ongoing, Progressing     Problem: Skin Injury Risk Increased  Goal: Skin Health and Integrity  Outcome: Ongoing, Progressing     Problem: Breastfeeding  Goal: Effective Breastfeeding  Outcome: Ongoing, Progressing     Problem: Adjustment to Role Transition (Postpartum Vaginal Delivery)  Goal: Successful Maternal Role Transition  Outcome: Ongoing, Progressing     Problem: Bleeding (Postpartum Vaginal Delivery)  Goal: Hemostasis  Outcome: Ongoing, Progressing     Problem: Infection (Postpartum Vaginal Delivery)  Goal: Absence of Infection Signs/Symptoms  Outcome: Ongoing, Progressing     Problem: Pain (Postpartum Vaginal Delivery)  Goal: Acceptable Pain Control  Outcome: Ongoing, Progressing      Problem: Urinary Retention (Postpartum Vaginal Delivery)  Goal: Effective Urinary Elimination  Outcome: Ongoing, Progressing

## 2023-09-30 NOTE — PROGRESS NOTES
"LABOR NOTE    S:  MD to bedside for routine cervical exam. Epidural working well.    O: BP (!) 140/85   Pulse 70   Temp 97.8 °F (36.6 °C) (Oral)   Resp 16   Ht 5' 4" (1.626 m)   Wt 69.3 kg (152 lb 12.5 oz)   LMP 01/13/2023   SpO2 100%   Breastfeeding No   BMI 26.22 kg/m²     Aox3, NAD  Unlabored breathing    FHT: 120 bpm, moderate variability, +accels, no decels, Cat 1 (reassuring)  CTX: q 3 minutes, pit @ 30  SVE: 3/70/-2, IUPC placed    TIMELINE:  2230: 1/50/-3, scott balloon placed  0300: 2/50/-3, scott balloon firmly in place  0830: 2/50/-3, scott remains in place  1245: 3/30/-3, scott out  1815: 3/70/-2, pit @ 30  Epidural  2000: 3/70/-2, AROM clear  2300: 3/70/-2, pit @ 30, IUPC placed    PLAN:  Will increase pitocin to 40 U, IUPC placed  Continue Close Maternal/Fetal Monitoring  Pitocin Augmentation per protocol  Recheck 4 hours or PRN  Place SCDs for VTE ppx  Continue magnesium sulfate for seizure ppx, continue to monitor for signs and symptoms of toxicity        Alethea Montejo MD/MPH  OB/GYN PGY4      "

## 2023-09-30 NOTE — PROGRESS NOTES
"LABOR NOTE    S:  MD to bedside for routine cervical exam. Epidural working well.    O: BP (!) 148/78   Pulse 71   Temp 97.8 °F (36.6 °C) (Oral)   Resp 16   Ht 5' 4" (1.626 m)   Wt 69.3 kg (152 lb 12.5 oz)   LMP 01/13/2023   SpO2 99%   Breastfeeding No   BMI 26.22 kg/m²     Aox3, NAD  Unlabored breathing    FHT: 130 bpm, moderate variability, +accels, occasional variable decels, overall reassuring  CTX: q 2-4 minutes  SVE: 3/70/-2, IUPC placed    TIMELINE:  2230: 1/50/-3, scott balloon placed  0300: 2/50/-3, scott balloon firmly in place  0830: 2/50/-3, scott remains in place  1245: 3/30/-3, scott out  1815: 3/70/-2, pit @ 30  Epidural  2000: 3/70/-2, AROM clear  2300: 3/70/-2, pit @ 30, IUPC placed  0300: 3/70/-2, pit at 38    PLAN:  Labia and cervix with notable swelling, will give benadryl IV and apply ice to perineum   Continue Close Maternal/Fetal Monitoring  Pitocin Augmentation per protocol; inadequate MVUs on 38 of pitocin.  Recheck 4 hours or PRN  Place SCDs for VTE ppx  Continue magnesium sulfate for seizure ppx, continue to monitor for signs and symptoms of toxicity        Vanesa Sutton MD   OB/GYN PGY-2    "

## 2023-09-30 NOTE — L&D DELIVERY NOTE
[facility] - Labor & Delivery  Vaginal Delivery   Operative Note    SUMMARY     Normal spontaneous vaginal delivery of live infant after approximately 10 minutes of maternal pushing effort. Infant delivered in OA presentation. No nuchal cord was noted with delivery.  Infant was placed on mothers abdomen for skin to skin and bulb suctioning performed. Delayed cord clamping was performed. Cord was cut and clamped and cord blood was collected.    Spontaneous delivery of placenta with gentle traction applied. IV pitocin was given noting uterine atony without bleeding. 800 mcg of cytotec given rectally with good uterine tone noted following.  No trailing membranes were noted on bimanual examination. Placenta was inspected and noted to be intact.    The IUD was removed from the manufacture's device and strings were cut. The IUD was inserted and placed high in the uterine fundus without difficulty. Position was confirmed on bimanual exam. Hemostasis noted. The patient tolerated the procedure well.    1st degree laceration noted and repaired in running locked fashion using 2-0 Vicryl. Bilateral periurethral lacerations hemostatic.    Patient tolerated delivery well. Sponge, needle, and lap counted correctly x2.  mL.     Device   LILETTA  Lot #   [id number]  Expiration date 26    Loan Hubbard MD  OBGYN PGY-2     Indications: Severe pre-eclampsia in third trimester  Pregnancy complicated by:   Patient Active Problem List   Diagnosis    Amenorrhea    Initial obstetric visit in first trimester    Rh negative state in antepartum period, third trimester    Encounter for supervision of normal first pregnancy in third trimester    Nausea/vomiting in pregnancy    Bacterial vaginosis    Uterine size date discrepancy pregnancy    Hypokalemia    Severe pre-eclampsia in third trimester    Supervision of high-risk pregnancy, third trimester     (spontaneous vaginal delivery)     Admitting GA: 37w3d    Delivery Information  "for Jay Johnson    Birth information:  YOB: 2025   Time of birth: 8:18 AM   Sex: male   Head Delivery Date/Time: 2023  8:18 AM   Delivery type: Vaginal, Spontaneous   Gestational Age: 37w3d        Delivery Providers    Delivering clinician: Vanesa Allen MD   Provider Role    Loan Hubbard MD Resident    [facility] RN Registered Nurse    Margot Zamora RN Registered Nurse    Priscilla Garcia RN Charge Nurse              Measurements    Weight: 2490 g  Weight (lbs): 5 lb 7.8 oz  Length: 48.3 cm  Length (in): 19"  Head circumference: 29.8 cm  Chest circumference: 29.8 cm         Apgars    Living status: Living  Apgar Component Scores:  1 min.:  5 min.:  10 min.:  15 min.:  20 min.:    Skin color:  1  1       Heart rate:  2  2       Reflex irritability:  2  2       Muscle tone:  2  2       Respiratory effort:  2  2       Total:  9  9       Apgars assigned by: ERIK ZAMORA RN         Operative Delivery    Forceps attempted?: No  Vacuum extractor attempted?: No         Shoulder Dystocia    Shoulder dystocia present?: No           Presentation    Presentation: Vertex  Position: Left Occiput Anterior           Interventions/Resuscitation    Method: Bulb Suctioning, Tactile Stimulation       Cord    Vessels: 3 vessels  Complications: None  Delayed Cord Clamping?: Yes  Cord Clamped Date/Time: 2023  8:19 AM  Cord Blood Disposition: Sent with Baby  Gases Sent?: No  Stem Cell Collection (by MD): No       Placenta    Placenta delivery date/time: 2023 0822  Placenta removal: Spontaneous  Placenta appearance: Intact  Placenta disposition: Discarded           Labor Events:       labor: No     Labor Onset Date/Time: 2023 20:05     Dilation Complete Date/Time: 2023 07:41     Start Pushing Date/Time: 2023 08:11       Start Pushing Date/Time: 2023 08:11     Rupture Date/Time: 23         Rupture type: ARM (Artificial Rupture)         Fluid " Amount:       Fluid Color: Clear               steroids: None     Antibiotics given for GBS: No     Induction: balloon dilation (Rivera);misoprostol     Indications for induction:  Hypertension     Augmentation: amniotomy;oxytocin     Indications for augmentation: Hypertension     Labor complications: None     Additional complications:          Cervical ripening:                     Delivery:      Episiotomy: None     Indication for Episiotomy:       Perineal Lacerations: 1st Repaired:  Yes   Periurethral Laceration:   Repaired:     Labial Laceration:   Repaired:     Sulcus Laceration:   Repaired:     Vaginal Laceration:   Repaired:     Cervical Laceration:   Repaired:     Repair suture:       Repair # of packets: 1     Last Value - EBL - Nursing (mL):       Sum - EBL - Nursing (mL): 0     Last Value - EBL - Anesthesia (mL):      Calculated QBL (mL): 150      Vaginal Sweep Performed: Yes     Surgicount Correct: Yes     Vaginal Packing: No Quantity:       Other providers:       Anesthesia    Method: Epidural          Details (if applicable):  Trial of Labor      Categorization:      Priority:     Indications for :     Incision Type:       Additional  information:  Forceps:    Vacuum:    Breech:    Observed anomalies    Other (Comments):

## 2023-09-30 NOTE — PROGRESS NOTES
"LABOR NOTE    S:  MD to bedside for routine cervical exam. Epidural working well.    O: /86   Pulse 78   Temp 98.4 °F (36.9 °C) (Oral)   Resp 16   Ht 5' 4" (1.626 m)   Wt 69.3 kg (152 lb 12.5 oz)   LMP 01/13/2023   SpO2 100%   Breastfeeding No   BMI 26.22 kg/m²   -250 ml/hr    Aox3, NAD  Unlabored breathing    FHT: 120 bpm, moderate variability, +accels, no decels, Cat 1 (reassuring)  CTX: q 3 minutes, pit @ 30  SVE: 3/70/-2, AROM clear    TIMELINE:  2230: 1/50/-3, scott balloon placed  0300: 2/50/-3, scott balloon firmly in place  0830: 2/50/-3, scott remains in place  1245: 3/30/-3, scott out  1815: 3/70/-2, pit @ 30  Epidural  2000: 3/70/-2, AROM clear    PLAN:  Continue Close Maternal/Fetal Monitoring  Pitocin Augmentation per protocol  Recheck 4 hours or PRN  Place SCDs for VTE ppx  Continue magnesium sulfate for seizure ppx, continue to monitor for signs and symptoms of toxicity        Vanesa Sutton MD   OB/GYN PGY-2    "

## 2023-09-30 NOTE — PLAN OF CARE
Problem: Urinary Retention (Postpartum Vaginal Delivery)  Goal: Effective Urinary Elimination  Outcome: Ongoing, Progressing     Problem: Pain (Postpartum Vaginal Delivery)  Goal: Acceptable Pain Control  Outcome: Ongoing, Progressing     Problem: Infection (Postpartum Vaginal Delivery)  Goal: Absence of Infection Signs/Symptoms  Outcome: Ongoing, Progressing     Problem: Bleeding (Postpartum Vaginal Delivery)  Goal: Hemostasis  Outcome: Ongoing, Progressing

## 2023-09-30 NOTE — PROGRESS NOTES
09/30/23 0849 09/30/23 0904   Vital Signs   BP (!) 167/91 (!) 160/82   MAP (mmHg) 124 114     Mds notified at 0905. Orders given.

## 2023-10-01 LAB
BASOPHILS # BLD AUTO: 0.03 K/UL (ref 0–0.2)
BASOPHILS NFR BLD: 0.2 % (ref 0–1.9)
DIFFERENTIAL METHOD: ABNORMAL
EOSINOPHIL # BLD AUTO: 0.2 K/UL (ref 0–0.5)
EOSINOPHIL NFR BLD: 1.4 % (ref 0–8)
ERYTHROCYTE [DISTWIDTH] IN BLOOD BY AUTOMATED COUNT: 12.8 % (ref 11.5–14.5)
HCT VFR BLD AUTO: 28.2 % (ref 37–48.5)
HGB BLD-MCNC: 9.9 G/DL (ref 12–16)
IMM GRANULOCYTES # BLD AUTO: 0.11 K/UL (ref 0–0.04)
IMM GRANULOCYTES NFR BLD AUTO: 0.7 % (ref 0–0.5)
LYMPHOCYTES # BLD AUTO: 2.2 K/UL (ref 1–4.8)
LYMPHOCYTES NFR BLD: 12.9 % (ref 18–48)
MCH RBC QN AUTO: 30.5 PG (ref 27–31)
MCHC RBC AUTO-ENTMCNC: 35.1 G/DL (ref 32–36)
MCV RBC AUTO: 87 FL (ref 82–98)
MONOCYTES # BLD AUTO: 1 K/UL (ref 0.3–1)
MONOCYTES NFR BLD: 6.2 % (ref 4–15)
NEUTROPHILS # BLD AUTO: 13.1 K/UL (ref 1.8–7.7)
NEUTROPHILS NFR BLD: 78.6 % (ref 38–73)
NRBC BLD-RTO: 0 /100 WBC
PLATELET # BLD AUTO: 185 K/UL (ref 150–450)
PMV BLD AUTO: 11.6 FL (ref 9.2–12.9)
RBC # BLD AUTO: 3.25 M/UL (ref 4–5.4)
WBC # BLD AUTO: 16.61 K/UL (ref 3.9–12.7)

## 2023-10-01 PROCEDURE — 63600175 PHARM REV CODE 636 W HCPCS

## 2023-10-01 PROCEDURE — 36415 COLL VENOUS BLD VENIPUNCTURE: CPT | Performed by: OBSTETRICS & GYNECOLOGY

## 2023-10-01 PROCEDURE — 99232 SBSQ HOSP IP/OBS MODERATE 35: CPT | Mod: ,,, | Performed by: OBSTETRICS & GYNECOLOGY

## 2023-10-01 PROCEDURE — 11000001 HC ACUTE MED/SURG PRIVATE ROOM

## 2023-10-01 PROCEDURE — 85025 COMPLETE CBC W/AUTO DIFF WBC: CPT | Performed by: OBSTETRICS & GYNECOLOGY

## 2023-10-01 PROCEDURE — 25000003 PHARM REV CODE 250

## 2023-10-01 PROCEDURE — 99232 PR SUBSEQUENT HOSPITAL CARE,LEVL II: ICD-10-PCS | Mod: ,,, | Performed by: OBSTETRICS & GYNECOLOGY

## 2023-10-01 RX ORDER — NIFEDIPINE 30 MG/1
30 TABLET, EXTENDED RELEASE ORAL DAILY
Status: DISCONTINUED | OUTPATIENT
Start: 2023-10-01 | End: 2023-10-02 | Stop reason: HOSPADM

## 2023-10-01 RX ADMIN — DOCUSATE SODIUM 200 MG: 100 CAPSULE, LIQUID FILLED ORAL at 08:10

## 2023-10-01 RX ADMIN — PRENATAL VIT W/ FE FUMARATE-FA TAB 27-0.8 MG 1 TABLET: 27-0.8 TAB at 09:10

## 2023-10-01 RX ADMIN — IBUPROFEN 600 MG: 600 TABLET, FILM COATED ORAL at 09:10

## 2023-10-01 RX ADMIN — IBUPROFEN 600 MG: 600 TABLET, FILM COATED ORAL at 08:10

## 2023-10-01 RX ADMIN — MAGNESIUM SULFATE HEPTAHYDRATE 2 G/HR: 40 INJECTION, SOLUTION INTRAVENOUS at 05:10

## 2023-10-01 RX ADMIN — NIFEDIPINE 30 MG: 30 TABLET, FILM COATED, EXTENDED RELEASE ORAL at 09:10

## 2023-10-01 RX ADMIN — SODIUM CHLORIDE, POTASSIUM CHLORIDE, SODIUM LACTATE AND CALCIUM CHLORIDE: 600; 310; 30; 20 INJECTION, SOLUTION INTRAVENOUS at 05:10

## 2023-10-01 RX ADMIN — DOCUSATE SODIUM 200 MG: 100 CAPSULE, LIQUID FILLED ORAL at 09:10

## 2023-10-01 NOTE — PROGRESS NOTES
POSTPARTUM PROGRESS NOTE    Subjective:     PPD/POD#: 1   Procedure:    EGA: 37w3d   N/V: No   F/C: No   Abd Pain: Mild, well-controlled with oral pain medication   Lochia: Mild   Voiding: Yes   Ambulating: Yes   Bowel fnc: No   Contraception: Immediate post-placental Liletta IUD placed     Objective:      Temp:  [98 °F (36.7 °C)-98.7 °F (37.1 °C)] 98.1 °F (36.7 °C)  Pulse:  [] 84  Resp:  [16-20] 17  SpO2:  [97 %-100 %] 99 %  BP: (126-167)/() 155/79    Abdomen: Soft, appropriately tender   Uterus: Firm, no fundal tenderness   Incision: N/A     Lab Review    Recent Labs   Lab 23  1746      K 2.9*      CO2 21*   BUN 4*   CREATININE 0.7   GLU 93   PROT 6.5   BILITOT 0.4   ALKPHOS 82   ALT 22   AST 25         Recent Labs   Lab 23  1746   WBC 8.73   HGB 10.9*   HCT 31.1*   MCV 86              I/O    Intake/Output Summary (Last 24 hours) at 10/1/2023 0237  Last data filed at 2023 2330  Gross per 24 hour   Intake 3787.29 ml   Output 5225 ml   Net -1437.71 ml          Assessment and Plan:   Postpartum care:  - Patient doing well.  - Continue routine management and advances.    2. PreE w/SF  - BP as above  - asymptomatic  - preE labs as above  - UOP: 1.33 cc/kg/hr  - Mag: continue  - Hypertensive agent: will initiate procardia 30    3. Rh negative  - Baby: A-  - Rhogam not indicated      Brenda Campos MD PGY1  Obstetrics and Gynecology

## 2023-10-01 NOTE — ANESTHESIA POSTPROCEDURE EVALUATION
Anesthesia Post Evaluation    Patient: Kika Johnson    Procedure(s) Performed: * No procedures listed *    Final Anesthesia Type: epidural      Patient location during evaluation: floor  Patient participation: Yes- Able to Participate  Level of consciousness: awake and alert  Post-procedure vital signs: reviewed and stable  Pain management: adequate  Airway patency: patent  MARILOU mitigation strategies: Use of major conduction anesthesia (spinal/epidural) or peripheral nerve block and Multimodal analgesia  PONV status at discharge: No PONV  Anesthetic complications: no      Cardiovascular status: blood pressure returned to baseline  Respiratory status: unassisted, spontaneous ventilation and room air  Hydration status: euvolemic  Follow-up not needed.          Vitals Value Taken Time   /77 10/01/23 0901   Temp 36.6 °C (97.8 °F) 10/01/23 0900   Pulse 98 10/01/23 0954   Resp 16 10/01/23 0900   SpO2 100 % 10/01/23 0954   Vitals shown include unvalidated device data.      No case tracking events are documented in the log.      Pain/Kaylene Score: Pain Rating Prior to Med Admin: 6 (10/1/2023  9:03 AM)

## 2023-10-01 NOTE — PLAN OF CARE
Feeding plan: Pt will monitor baby for early hunger cues, then, attempt breastfeeding.  If baby does not feed well, pt will feed baby EBM or formula via paced bottle feeding, then, pump her breasts for 15 minutes using the [employer] Symphony breast pump in the initiate phase.

## 2023-10-01 NOTE — PLAN OF CARE
Pt ambulating and voiding without difficulty. Patient safety maintained, side rails up, bed low and locked position. Pain well controlled with scheduled medication. Fundus midline, firm, with moderate lochia. VSS. Intake and out managed. Pt receiving continuous IV fluids of Mag/LR as ordered. Patient has denied any visual changes, RUQ pain, or headaches. Mother at bedside; responding to infant cues and bonding appropriately.

## 2023-10-01 NOTE — LACTATION NOTE
10/01/23 1615   Maternal Assessment   Breast Shape Right:;round   Breast Density Right:;soft   Areola Right:;elastic   Nipples Right:;everted   Maternal Infant Feeding   Maternal Emotional State relaxed;assist needed   Infant Positioning clutch/football   Signs of Milk Transfer   (no latch achieved)   Equipment Type   Breast Pump Type double electric, hospital grade   Breast Pump Flange Type hard   Breast Pump Flange Size 24 mm   Breast Pumping   Breast Pumping Interventions post-feed pumping encouraged   Breast Pumping double electric breast pump utilized     1330: Pt states that the baby has been really sleepy since the circumcision.  Unwrapped baby and assisted pt with attempting breastfeeding.  Baby too sleepy, and would not latch on.  Instructed pt to place baby skin to skin and watch baby for early hunger cues.  LC number placed on white board and instructed pt to call for assistance if needed.    1615:  Pt called  for assistance.  Upon arrival to room, baby asleep swaddled in patient's arms.  Unwrapped baby and removed clothing.  Baby awoke briefly, but fell asleep again once placed skin to skin on patient.  Attempted to latch baby to the breast, but no latch achieved.  Baby too sleepy.  Patient hand expressed one drop of colostrum, which was spoon fed to the baby.  Sine the baby has not eaten since early this morning,  recommended supplementation and pt to pump her breasts for adequate stimulation.  Reviewed options for supplementation including donor breastmilk and formula.  Pt opted to use formula for supplementation.  Reviewed alternative feeding methods including spoon, cup, syringe, and paced bottle feeding.  Pt opted to feed  via paced bottle feeding.   notified the patient's nurse of feeding plan and patient's decisions.    [employer] pump, tubing and collection containers brought to bedside.  Discussed proper pump setting of initiation phase.  Instructed on proper usage of pump  and to adjust suction according to maximum comfort level.  Verified appropriate flange fit.  Educated on the frequency and duration of pumping in order to promote and maintain a full milk supply.  Instructed on cleaning of breast pump parts.  Written instructions also given.  Pt verbalized understanding and appropriate recall for proper milk handling, collection, and storage. Pt pumped for 15 minutes and obtained 1 drop of EBM which was fed to infant using a gloved finger.  Praise and encouragement given.    Feeding plan: Pt will monitor baby for early hunger cues, then, attempt breastfeeding.  If baby does not feed well, pt will feed baby EBM or formula via paced bottle feeding, then, pump her breasts for 15 minutes using the [employer] Symphony breast pump in the initiate phase.

## 2023-10-01 NOTE — PLAN OF CARE
Pt VSS. Pain controlled with scheduled oral pain medication. Breastfeeding with moderate assistance. Fundus firm and midline with light lochia rubra. Voiding spontaneously with adequate output. Passing gas. Will continue to monitor pt and intervene as needed.    Problem: Adult Inpatient Plan of Care  Goal: Plan of Care Review  Outcome: Ongoing, Progressing  Goal: Patient-Specific Goal (Individualized)  Outcome: Ongoing, Progressing  Goal: Absence of Hospital-Acquired Illness or Injury  Outcome: Ongoing, Progressing  Goal: Optimal Comfort and Wellbeing  Outcome: Ongoing, Progressing  Goal: Readiness for Transition of Care  Outcome: Ongoing, Progressing     Problem:  Fall Injury Risk  Goal: Absence of Fall, Infant Drop and Related Injury  Outcome: Ongoing, Progressing     Problem: Infection  Goal: Absence of Infection Signs and Symptoms  Outcome: Ongoing, Progressing     Problem: Hypertensive Disorders in Pregnancy  Goal: Maternal-Fetal Stabilization  Outcome: Ongoing, Progressing     Problem: Skin Injury Risk Increased  Goal: Skin Health and Integrity  Outcome: Ongoing, Progressing     Problem: Breastfeeding  Goal: Effective Breastfeeding  Outcome: Ongoing, Progressing     Problem: Adjustment to Role Transition (Postpartum Vaginal Delivery)  Goal: Successful Maternal Role Transition  Outcome: Ongoing, Progressing     Problem: Bleeding (Postpartum Vaginal Delivery)  Goal: Hemostasis  Outcome: Ongoing, Progressing     Problem: Infection (Postpartum Vaginal Delivery)  Goal: Absence of Infection Signs/Symptoms  Outcome: Ongoing, Progressing     Problem: Pain (Postpartum Vaginal Delivery)  Goal: Acceptable Pain Control  Outcome: Ongoing, Progressing     Problem: Urinary Retention (Postpartum Vaginal Delivery)  Goal: Effective Urinary Elimination  Outcome: Ongoing, Progressing

## 2023-10-02 VITALS
WEIGHT: 152.75 LBS | SYSTOLIC BLOOD PRESSURE: 138 MMHG | HEART RATE: 77 BPM | HEIGHT: 64 IN | DIASTOLIC BLOOD PRESSURE: 83 MMHG | BODY MASS INDEX: 26.08 KG/M2 | RESPIRATION RATE: 18 BRPM | OXYGEN SATURATION: 96 % | TEMPERATURE: 99 F

## 2023-10-02 PROCEDURE — 25000003 PHARM REV CODE 250

## 2023-10-02 PROCEDURE — 58300 PR INSERT INTRAUTERINE DEVICE: ICD-10-PCS | Mod: ,,, | Performed by: OBSTETRICS & GYNECOLOGY

## 2023-10-02 PROCEDURE — 99238 HOSP IP/OBS DSCHRG MGMT 30/<: CPT | Mod: ,,, | Performed by: OBSTETRICS & GYNECOLOGY

## 2023-10-02 PROCEDURE — 58300 INSERT INTRAUTERINE DEVICE: CPT | Mod: ,,, | Performed by: OBSTETRICS & GYNECOLOGY

## 2023-10-02 PROCEDURE — 99238 PR HOSPITAL DISCHARGE DAY,<30 MIN: ICD-10-PCS | Mod: ,,, | Performed by: OBSTETRICS & GYNECOLOGY

## 2023-10-02 RX ORDER — ACETAMINOPHEN 325 MG/1
650 TABLET ORAL EVERY 6 HOURS PRN
Qty: 30 TABLET | Refills: 0 | Status: SHIPPED | OUTPATIENT
Start: 2023-10-02

## 2023-10-02 RX ORDER — DOCUSATE SODIUM 100 MG/1
200 CAPSULE, LIQUID FILLED ORAL 2 TIMES DAILY PRN
Qty: 30 CAPSULE | Refills: 0 | Status: SHIPPED | OUTPATIENT
Start: 2023-10-02

## 2023-10-02 RX ORDER — NIFEDIPINE 30 MG/1
30 TABLET, EXTENDED RELEASE ORAL DAILY
Qty: 30 TABLET | Refills: 11 | Status: SHIPPED | OUTPATIENT
Start: 2023-10-02 | End: 2024-10-01

## 2023-10-02 RX ORDER — IBUPROFEN 600 MG/1
600 TABLET ORAL EVERY 6 HOURS PRN
Qty: 30 TABLET | Refills: 0 | Status: SHIPPED | OUTPATIENT
Start: 2023-10-02

## 2023-10-02 RX ORDER — FERROUS SULFATE 325(65) MG
325 TABLET ORAL
Qty: 30 TABLET | Refills: 0 | Status: SHIPPED | OUTPATIENT
Start: 2023-10-02

## 2023-10-02 RX ADMIN — PRENATAL VIT W/ FE FUMARATE-FA TAB 27-0.8 MG 1 TABLET: 27-0.8 TAB at 09:10

## 2023-10-02 RX ADMIN — DOCUSATE SODIUM 200 MG: 100 CAPSULE, LIQUID FILLED ORAL at 09:10

## 2023-10-02 RX ADMIN — IBUPROFEN 600 MG: 600 TABLET, FILM COATED ORAL at 09:10

## 2023-10-02 RX ADMIN — IBUPROFEN 600 MG: 600 TABLET, FILM COATED ORAL at 02:10

## 2023-10-02 RX ADMIN — NIFEDIPINE 30 MG: 30 TABLET, FILM COATED, EXTENDED RELEASE ORAL at 09:10

## 2023-10-02 NOTE — PLAN OF CARE
Mom VSS. Breastfeeding and formula feeding baby. Mom is walking and voiding . Pain control with PO pain medication. Mom did pass a clot which had her IUD attached to it. MD Fonseca and charge nurse made aware. Also we did offered another form of birth control before leaving however patient stated she wanted to receive another IUD during her six week check up. Bleeding is light . Family at the bedside.

## 2023-10-02 NOTE — PLAN OF CARE
Pt ambulating and voiding without difficulty. Patient safety maintained, side rails up x2, bed low and locked position.  Pain well controlled with scheduled pain medication. Fundus midline, firm, with light lochia. VSS. Mother responding to infant cues and bonding appropriately.

## 2023-10-02 NOTE — PLAN OF CARE
10/02/23 1218   Final Note   Assessment Type Final Discharge Note   Anticipated Discharge Disposition Home   Post-Acute Status   Discharge Delays None known at this time

## 2023-10-02 NOTE — MEDICAL/APP STUDENT
POSTPARTUM PROGRESS NOTE    Subjective:     PPD/POD#: 2   Procedure:    EGA: 37w3d   N/V: No   F/C: No   Abd Pain: Mild, well-controlled with oral pain medication   Lochia: Mild   Voiding: Yes   Ambulating: Yes   Bowel fnc: No   Contraception: Immediate post-placental Liletta IUD placed   Patient denies headache, dizziness, visual changes, chest pain, shortness of breath, nausea or vomiting and right upper quadrant pain     Objective:      Temp:  [98.1 °F (36.7 °C)-98.8 °F (37.1 °C)] 98.8 °F (37.1 °C)  Pulse:  [69-82] 77  Resp:  [16-18] 18  SpO2:  [96 %-99 %] 96 %  BP: (109-139)/(59-91) 138/83    Abdomen: Soft, appropriately tender   Uterus: Firm, no fundal tenderness   Incision: N/A     Lab Review    Recent Labs   Lab 23  1746      K 2.9*      CO2 21*   BUN 4*   CREATININE 0.7   GLU 93   PROT 6.5   BILITOT 0.4   ALKPHOS 82   ALT 22   AST 25       Recent Labs   Lab 23  1746 10/01/23  0442   WBC 8.73 16.61*   HGB 10.9* 9.9*   HCT 31.1* 28.2*   MCV 86 87    185         I/O  No intake or output data in the 24 hours ending 10/02/23 1442       Assessment and Plan:   Postpartum care:  .  - Patient doing well.  - Reports difficulty with baby latching but is working with lactation services.  - Continue routine management and advances.    2. PreE w/SF:  - BP as above  - asymptomatic  - preE labs as above  - initiated procardia 30 mg 10/1    3. Anemia:  - asymptomatic  - ml  - H/H 9..2  - initiate iron and colace upon d/c      4. Rh negative:  - Baby: A-  - Rhogam not indicated      Michel Stoddard, MS3  [facility]     Attending Attestation  I agree with the above edited student note. Pt seen and examined, chart and labs reviewed.    Briefly, 27 yo  PPD#2 s/p SPONTANEOUS VAGINAL DELIVERY c/b PreE with SF. Pt is s/p Mg, asymptomatic, and BP well controlled on procardia 30XL. She is meeting discharge criteria today.    All questions answered. Stable for d/c home with outpatient  follow up     Yen Beasley MD  OB Hospitalist  10/2/2023

## 2023-10-02 NOTE — DISCHARGE SUMMARY
Delivery Discharge Summary  Obstetrics      Primary OB Clinician: Vanesa Allen MD    Admission date: 2023  Discharge date: 10/02/2023    Disposition: To home, self care    Discharge Diagnosis List:      Patient Active Problem List   Diagnosis    Amenorrhea    Initial obstetric visit in first trimester    Rh negative state in antepartum period, third trimester    Encounter for supervision of normal first pregnancy in third trimester    Nausea/vomiting in pregnancy    Bacterial vaginosis    Uterine size date discrepancy pregnancy    Hypokalemia    Severe pre-eclampsia in third trimester    Supervision of high-risk pregnancy, third trimester     (spontaneous vaginal delivery)       Procedure:     Hospital Course:  Kika Johnson is a 28 y.o. now , PPD #2 who was admitted on 2023 for  IOL. Patient was subsequently admitted to labor and delivery unit with signed consents.     Labor course was uncomplicated and resulted in  without complications.     Please see delivery note for further details. Her postpartum course was complicated PreE with severe features, pt received magnesium therapy until 24h postpartum and patient was started on Procardia 30 on 10/1. On discharge day, patient's pain is controlled with oral pain medications. Pt is tolerating ambulation without SOB or CP, and regular diet without N/V. Reports lochia is mild. Denies any HA, vision changes, F/C, LE swelling. Denies any breast pain/soreness.    Pt in stable condition and ready for discharge. She has been instructed to start and/or continue medications and follow up with her obstetrics provider as listed below.      Pertinent studies:  CBC  Recent Labs   Lab 23  1746 10/01/23  0442   WBC 8.73 16.61*   HGB 10.9* 9.9*   HCT 31.1* 28.2*   MCV 86 87    185          Immunization History   Administered Date(s) Administered    COVID-19, MRNA, LN-S, PF (MODERNA FULL 0.5 ML DOSE) 2021, 2021    Rho (D)  "Immune Globulin 2023    Rho (D) Immune Globulin - IM 2023    Tdap 2023        Delivery:    Episiotomy: None   Lacerations: 1st   Repair suture:     Repair # of packets: 1   Blood loss (ml):       Birth information:  YOB: 2025   Time of birth: 8:18 AM   Sex: male   Delivery type: Vaginal, Spontaneous   Gestational Age: 37w3d     Measurements    Weight: 2490 g  Weight (lbs): 5 lb 7.8 oz  Length: 48.3 cm  Length (in): 19"  Head circumference: 29.8 cm  Chest circumference: 29.8 cm         Delivery Clinician: Delivery Providers    Delivering clinician: Vanesa Allen MD   Provider Role    Loan Hubbard MD Resident    [facility] RN Registered Nurse    Margot Zamora RN Registered Nurse    Priscilla Garcia RN Charge Nurse             Additional  information:  Forceps:    Vacuum:    Breech:    Observed anomalies      Living?:     Apgars    Living status: Living  Apgar Component Scores:  1 min.:  5 min.:  10 min.:  15 min.:  20 min.:    Skin color:  1  1       Heart rate:  2  2       Reflex irritability:  2  2       Muscle tone:  2  2       Respiratory effort:  2  2       Total:  9  9       Apgars assigned by: ERIK ZAMORA RN         Placenta: Delivered:       appearance    Patient Instructions:   Current Discharge Medication List        START taking these medications    Details   acetaminophen (TYLENOL) 325 MG tablet Take 2 tablets (650 mg total) by mouth every 6 (six) hours as needed for Pain.  Qty: 30 tablet, Refills: 0      ferrous sulfate (IRON) 325 mg (65 mg iron) Tab tablet Take 1 tablet (325 mg total) by mouth daily with breakfast.  Qty: 30 tablet, Refills: 0      ibuprofen (ADVIL,MOTRIN) 600 MG tablet Take 1 tablet (600 mg total) by mouth every 6 (six) hours as needed for Pain.  Qty: 30 tablet, Refills: 0      NIFEdipine (PROCARDIA-XL) 30 MG (OSM) 24 hr tablet Take 1 tablet (30 mg total) by mouth once daily.  Qty: 30 tablet, Refills: 11    Comments: .           CONTINUE " these medications which have CHANGED    Details   docusate sodium (COLACE) 100 MG capsule Take 2 capsules (200 mg total) by mouth 2 (two) times daily as needed for Constipation.  Qty: 30 capsule, Refills: 0           CONTINUE these medications which have NOT CHANGED    Details   famotidine (PEPCID) 20 MG tablet Take 1 tablet (20 mg total) by mouth 2 (two) times daily.  Qty: 60 tablet, Refills: 11      metoclopramide HCl (REGLAN) 10 MG tablet Take 1 tablet (10 mg total) by mouth every 8 (eight) hours as needed (nausea). Alternate with Zofran every 4 hours  Qty: 90 tablet, Refills: 1    Associated Diagnoses: Nausea and vomiting in pregnancy           STOP taking these medications       aspirin (ECOTRIN) 81 MG EC tablet Comments:   Reason for Stopping:         nystatin-triamcinolone (MYCOLOG II) cream Comments:   Reason for Stopping:         ondansetron (ZOFRAN) 4 MG tablet Comments:   Reason for Stopping:         ondansetron (ZOFRAN-ODT) 4 MG TbDL Comments:   Reason for Stopping:         -iron-FA-om-[id number]-dha-epa (VITAFOL GUMMIES) 3.33 mg iron- 0.33 mg Chew Comments:   Reason for Stopping:         promethazine (PHENERGAN) 25 MG tablet Comments:   Reason for Stopping:               Discharge Procedure Orders   BREAST PUMP FOR HOME USE     Order Specific Question Answer Comments   Type of pump: Electric    Weight: 69.3 kg (152 lb 12.5 oz)    Length of need (1-99 months): 99      Diet Adult Regular     Diet Adult Regular     No driving until:   Order Comments: No driving until not taking narcotic pain medication.     Pelvic Rest   Order Comments: Pelvic rest until 6 weeks after discharge. Nothing in vagina -no sex, tampons, douching, etc.     Notify your health care provider if you experience any of the following:  temperature >100.4     Notify your health care provider if you experience any of the following:  persistent nausea and vomiting or diarrhea     Notify your health care provider if you experience any of the  following:  severe uncontrolled pain     Notify your health care provider if you experience any of the following:  redness, tenderness, or signs of infection (pain, swelling, redness, odor or green/yellow discharge around incision site)     Notify your health care provider if you experience any of the following:  difficulty breathing or increased cough     Notify your health care provider if you experience any of the following:  severe persistent headache     Notify your health care provider if you experience any of the following:  worsening rash     Notify your health care provider if you experience any of the following:  persistent dizziness, light-headedness, or visual disturbances     Notify your health care provider if you experience any of the following:  increased confusion or weakness     Notify your health care provider if you experience any of the following:   Order Comments: Heavy vaginal bleeding saturating more than 1 pad per hr for at least consecutive 2 hrs.     Pelvic Rest     Notify your health care provider if you experience any of the following:  temperature >100.4     Notify your health care provider if you experience any of the following:  persistent nausea and vomiting or diarrhea     Notify your health care provider if you experience any of the following:  severe uncontrolled pain     Notify your health care provider if you experience any of the following:  difficulty breathing or increased cough     Notify your health care provider if you experience any of the following:  severe persistent headache     Notify your health care provider if you experience any of the following:  persistent dizziness, light-headedness, or visual disturbances     Activity as tolerated     Activity as tolerated        Follow-up Information       Vanesa Allen MD Follow up in 6 week(s).    Specialty: Obstetrics and Gynecology  Why: postpartum appointment  Contact information:  [address]  [facility] [id number] [phone]               Vanesa Allen MD Follow up in 3 day(s).    Specialty: Obstetrics and Gynecology  Why: BP check  Contact information:  [address] [facility]  [address]  [facility] [address] [phone]                              Ronnie Bazzi MD  Obstetrics and Gynecology- PGY1      Attending Attestation  I agree with the above edited resident note. Pt seen and examined, chart and labs reviewed.    Briefly, 29 yo  PPD#2 s/p SPONTANEOUS VAGINAL DELIVERY. Hospital course c/b PreE with SF, pt rec'd Mg until 24h PP and was started on procardia 30XL daily. On day of discharge she remained asymptomatic and had well controlled BP. She was meeting all discharge milestones and MAAME return precautions were reviewed.    All questions answered. Stable for d/c home with outpatient follow up     Yen Beasley MD  OB Hospitalist  10/2/2023

## 2023-10-03 ENCOUNTER — PATIENT MESSAGE (OUTPATIENT)
Dept: OBSTETRICS AND GYNECOLOGY | Facility: CLINIC | Age: 28
End: 2023-10-03
Payer: COMMERCIAL

## 2023-10-05 ENCOUNTER — PATIENT MESSAGE (OUTPATIENT)
Dept: OBSTETRICS AND GYNECOLOGY | Facility: OTHER | Age: 28
End: 2023-10-05
Payer: COMMERCIAL

## 2023-10-06 ENCOUNTER — POSTPARTUM VISIT (OUTPATIENT)
Dept: OBSTETRICS AND GYNECOLOGY | Facility: CLINIC | Age: 28
End: 2023-10-06
Payer: MEDICAID

## 2023-10-06 VITALS
BODY MASS INDEX: 22.2 KG/M2 | DIASTOLIC BLOOD PRESSURE: 80 MMHG | WEIGHT: 130.06 LBS | HEIGHT: 64 IN | SYSTOLIC BLOOD PRESSURE: 120 MMHG

## 2023-10-06 DIAGNOSIS — O14.13 SEVERE PRE-ECLAMPSIA IN THIRD TRIMESTER: ICD-10-CM

## 2023-10-06 DIAGNOSIS — Z30.09 ENCOUNTER FOR COUNSELING REGARDING CONTRACEPTION: ICD-10-CM

## 2023-10-06 PROCEDURE — 99999 PR PBB SHADOW E&M-EST. PATIENT-LVL III: ICD-10-PCS | Mod: PBBFAC,,, | Performed by: OBSTETRICS & GYNECOLOGY

## 2023-10-06 PROCEDURE — 99213 OFFICE O/P EST LOW 20 MIN: CPT | Mod: PBBFAC,TH | Performed by: OBSTETRICS & GYNECOLOGY

## 2023-10-06 PROCEDURE — 99999 PR PBB SHADOW E&M-EST. PATIENT-LVL III: CPT | Mod: PBBFAC,,, | Performed by: OBSTETRICS & GYNECOLOGY

## 2023-10-06 NOTE — PROGRESS NOTES
Chief Complaint   Patient presents with    Postpartum Care     B/p check       HPI:   28 y.o.  here today for 1 week PP visit for BP/mood check. She is s/p  on 23, IOL for GHTN and subsequently developed PreE w/ SF. Delivered a VMI ([facility]) 5#8 oz, Apgars 9/9. Repair of first degree laceration. Had PP IUD insertion, however IUD fell out prior to hospital discharge. She notes continued cramping and bleeding, but not heavy. She is breast feeding and hand pumping, has not yet been able to get breast pump. Denies HA, scotoma, RUQ/epigastric pain, or other s/sx of severe PreE. She was discharged on Procardia 30 mg daily and has been taking it. Good support at home. Denies depression, anhedonia, SI/HI.      LMP Dates from Last 1 Encounters:   LMP: 2023       Labs / Significant Studies  Pap (3/2023): NILM/HPV neg    Admission on 2023, Discharged on 10/02/2023   Component Date Value Ref Range Status    WBC 2023 8.73  3.90 - 12.70 K/uL Final    RBC 2023 3.63 (L)  4.00 - 5.40 M/uL Final    Hemoglobin 2023 10.9 (L)  12.0 - 16.0 g/dL Final    Hematocrit 2023 31.1 (L)  37.0 - 48.5 % Final    MCV 2023 86  82 - 98 fL Final    MCH 2023 30.0  27.0 - 31.0 pg Final    MCHC 2023 35.0  32.0 - 36.0 g/dL Final    RDW 2023 12.7  11.5 - 14.5 % Final    Platelets 2023 205  150 - 450 K/uL Final    MPV 2023 12.4  9.2 - 12.9 fL Final    Immature Granulocytes 2023 0.6 (H)  0.0 - 0.5 % Final    Gran # (ANC) 2023 6.3  1.8 - 7.7 K/uL Final    Immature Grans (Abs) 2023 0.05 (H)  0.00 - 0.04 K/uL Final    Comment: Mild elevation in immature granulocytes is non specific and   can be seen in a variety of conditions including stress response,   acute inflammation, trauma and pregnancy. Correlation with other   laboratory and clinical findings is essential.      Lymph # 2023 1.7  1.0 - 4.8 K/uL Final    Mono # 2023 0.5  0.3 - 1.0 K/uL  Final    Eos # 09/28/2023 0.1  0.0 - 0.5 K/uL Final    Baso # 09/28/2023 0.03  0.00 - 0.20 K/uL Final    nRBC 09/28/2023 0  0 /100 WBC Final    Gran % 09/28/2023 72.5  38.0 - 73.0 % Final    Lymph % 09/28/2023 19.0  18.0 - 48.0 % Final    Mono % 09/28/2023 6.1  4.0 - 15.0 % Final    Eosinophil % 09/28/2023 1.5  0.0 - 8.0 % Final    Basophil % 09/28/2023 0.3  0.0 - 1.9 % Final    Differential Method 09/28/2023 Automated   Final    Sodium 09/28/2023 137  136 - 145 mmol/L Final    Potassium 09/28/2023 2.9 (L)  3.5 - 5.1 mmol/L Final    Chloride 09/28/2023 103  95 - 110 mmol/L Final    CO2 09/28/2023 21 (L)  23 - 29 mmol/L Final    Glucose 09/28/2023 93  70 - 110 mg/dL Final    BUN 09/28/2023 4 (L)  6 - 20 mg/dL Final    Creatinine 09/28/2023 0.7  0.5 - 1.4 mg/dL Final    Calcium 09/28/2023 8.5 (L)  8.7 - 10.5 mg/dL Final    Total Protein 09/28/2023 6.5  6.0 - 8.4 g/dL Final    Albumin 09/28/2023 2.7 (L)  3.5 - 5.2 g/dL Final    Total Bilirubin 09/28/2023 0.4  0.1 - 1.0 mg/dL Final    Comment: For infants and newborns, interpretation of results should be based  on gestational age, weight and in agreement with clinical  observations.    Premature Infant recommended reference ranges:  Up to 24 hours.............<8.0 mg/dL  Up to 48 hours............<12.0 mg/dL  3-5 days..................<15.0 mg/dL  6-29 days.................<15.0 mg/dL      Alkaline Phosphatase 09/28/2023 82  55 - 135 U/L Final    AST 09/28/2023 25  10 - 40 U/L Final    ALT 09/28/2023 22  10 - 44 U/L Final    eGFR 09/28/2023 >60  >60 mL/min/1.73 m^2 Final    Anion Gap 09/28/2023 13  8 - 16 mmol/L Final    Group & Rh 09/28/2023 A NEG   Final    Indirect Davian 09/28/2023 POS (A)   Final    Specimen Outdate 09/28/2023 10/01/2023 23:59   Final    Protein, Urine Random 09/28/2023 9  0 - 15 mg/dL Final    Creatinine, Urine 09/28/2023 66.7  15.0 - 325.0 mg/dL Final    Prot/Creat Ratio, Urine 09/28/2023 0.13  0.00 - 0.20 Final    Antibody ID 09/28/2023 POS    Final    Rhogam Antibody    WBC 10/01/2023 16.61 (H)  3.90 - 12.70 K/uL Final    RBC 10/01/2023 3.25 (L)  4.00 - 5.40 M/uL Final    Hemoglobin 10/01/2023 9.9 (L)  12.0 - 16.0 g/dL Final    Hematocrit 10/01/2023 28.2 (L)  37.0 - 48.5 % Final    MCV 10/01/2023 87  82 - 98 fL Final    MCH 10/01/2023 30.5  27.0 - 31.0 pg Final    MCHC 10/01/2023 35.1  32.0 - 36.0 g/dL Final    RDW 10/01/2023 12.8  11.5 - 14.5 % Final    Platelets 10/01/2023 185  150 - 450 K/uL Final    MPV 10/01/2023 11.6  9.2 - 12.9 fL Final    Immature Granulocytes 10/01/2023 0.7 (H)  0.0 - 0.5 % Final    Gran # (ANC) 10/01/2023 13.1 (H)  1.8 - 7.7 K/uL Final    Immature Grans (Abs) 10/01/2023 0.11 (H)  0.00 - 0.04 K/uL Final    Comment: Mild elevation in immature granulocytes is non specific and   can be seen in a variety of conditions including stress response,   acute inflammation, trauma and pregnancy. Correlation with other   laboratory and clinical findings is essential.      Lymph # 10/01/2023 2.2  1.0 - 4.8 K/uL Final    Mono # 10/01/2023 1.0  0.3 - 1.0 K/uL Final    Eos # 10/01/2023 0.2  0.0 - 0.5 K/uL Final    Baso # 10/01/2023 0.03  0.00 - 0.20 K/uL Final    nRBC 10/01/2023 0  0 /100 WBC Final    Gran % 10/01/2023 78.6 (H)  38.0 - 73.0 % Final    Lymph % 10/01/2023 12.9 (L)  18.0 - 48.0 % Final    Mono % 10/01/2023 6.2  4.0 - 15.0 % Final    Eosinophil % 10/01/2023 1.4  0.0 - 8.0 % Final    Basophil % 10/01/2023 0.2  0.0 - 1.9 % Final    Differential Method 10/01/2023 Automated   Final   Routine Prenatal on 09/25/2023   Component Date Value Ref Range Status    Protein, Urine Random 09/25/2023 <7  0 - 15 mg/dL Final    Creatinine, Urine 09/25/2023 44.0  15.0 - 325.0 mg/dL Final    Prot/Creat Ratio, Urine 09/25/2023 Unable to calculate  0.00 - 0.20 Final   Routine Prenatal on 09/22/2023   Component Date Value Ref Range Status    Strep B Culture 09/22/2023 No Group B Streptococcus isolated   Final   Routine Prenatal on 09/18/2023   Component  Date Value Ref Range Status    Protein, Urine Random 09/18/2023 11  0 - 15 mg/dL Final    Creatinine, Urine 09/18/2023 101.0  15.0 - 325.0 mg/dL Final    Prot/Creat Ratio, Urine 09/18/2023 0.11  0.00 - 0.20 Final   Lab Visit on 09/18/2023   Component Date Value Ref Range Status    WBC 09/18/2023 9.10  3.90 - 12.70 K/uL Final    RBC 09/18/2023 3.51 (L)  4.00 - 5.40 M/uL Final    Hemoglobin 09/18/2023 10.6 (L)  12.0 - 16.0 g/dL Final    Hematocrit 09/18/2023 30.9 (L)  37.0 - 48.5 % Final    MCV 09/18/2023 88  82 - 98 fL Final    MCH 09/18/2023 30.2  27.0 - 31.0 pg Final    MCHC 09/18/2023 34.3  32.0 - 36.0 g/dL Final    RDW 09/18/2023 12.4  11.5 - 14.5 % Final    Platelets 09/18/2023 191  150 - 450 K/uL Final    MPV 09/18/2023 11.6  9.2 - 12.9 fL Final    Immature Granulocytes 09/18/2023 0.8 (H)  0.0 - 0.5 % Final    Gran # (ANC) 09/18/2023 6.6  1.8 - 7.7 K/uL Final    Immature Grans (Abs) 09/18/2023 0.07 (H)  0.00 - 0.04 K/uL Final    Comment: Mild elevation in immature granulocytes is non specific and   can be seen in a variety of conditions including stress response,   acute inflammation, trauma and pregnancy. Correlation with other   laboratory and clinical findings is essential.      Lymph # 09/18/2023 1.6  1.0 - 4.8 K/uL Final    Mono # 09/18/2023 0.6  0.3 - 1.0 K/uL Final    Eos # 09/18/2023 0.1  0.0 - 0.5 K/uL Final    Baso # 09/18/2023 0.04  0.00 - 0.20 K/uL Final    nRBC 09/18/2023 0  0 /100 WBC Final    Gran % 09/18/2023 72.9  38.0 - 73.0 % Final    Lymph % 09/18/2023 17.7 (L)  18.0 - 48.0 % Final    Mono % 09/18/2023 6.7  4.0 - 15.0 % Final    Eosinophil % 09/18/2023 1.5  0.0 - 8.0 % Final    Basophil % 09/18/2023 0.4  0.0 - 1.9 % Final    Differential Method 09/18/2023 Automated   Final    Sodium 09/18/2023 137  136 - 145 mmol/L Final    Potassium 09/18/2023 2.8 (L)  3.5 - 5.1 mmol/L Final    Chloride 09/18/2023 101  95 - 110 mmol/L Final    CO2 09/18/2023 25  23 - 29 mmol/L Final    Glucose 09/18/2023 97   70 - 110 mg/dL Final    BUN 09/18/2023 5 (L)  6 - 20 mg/dL Final    Creatinine 09/18/2023 0.8  0.5 - 1.4 mg/dL Final    Calcium 09/18/2023 8.4 (L)  8.7 - 10.5 mg/dL Final    Total Protein 09/18/2023 6.3  6.0 - 8.4 g/dL Final    Albumin 09/18/2023 2.8 (L)  3.5 - 5.2 g/dL Final    Total Bilirubin 09/18/2023 0.3  0.1 - 1.0 mg/dL Final    Comment: For infants and newborns, interpretation of results should be based  on gestational age, weight and in agreement with clinical  observations.    Premature Infant recommended reference ranges:  Up to 24 hours.............<8.0 mg/dL  Up to 48 hours............<12.0 mg/dL  3-5 days..................<15.0 mg/dL  6-29 days.................<15.0 mg/dL      Alkaline Phosphatase 09/18/2023 74  55 - 135 U/L Final    AST 09/18/2023 20  10 - 40 U/L Final    ALT 09/18/2023 18  10 - 44 U/L Final    eGFR 09/18/2023 >60  >60 mL/min/1.73 m^2 Final    Anion Gap 09/18/2023 11  8 - 16 mmol/L Final   Lab Visit on 09/14/2023   Component Date Value Ref Range Status    Sodium 09/14/2023 137  136 - 145 mmol/L Final    Potassium 09/14/2023 2.9 (L)  3.5 - 5.1 mmol/L Final    Chloride 09/14/2023 102  95 - 110 mmol/L Final    CO2 09/14/2023 27  23 - 29 mmol/L Final    Glucose 09/14/2023 80  70 - 110 mg/dL Final    BUN 09/14/2023 3 (L)  6 - 20 mg/dL Final    Creatinine 09/14/2023 0.6  0.5 - 1.4 mg/dL Final    Calcium 09/14/2023 9.0  8.7 - 10.5 mg/dL Final    Anion Gap 09/14/2023 8  8 - 16 mmol/L Final    eGFR 09/14/2023 >60  >60 mL/min/1.73 m^2 Final    AST 09/14/2023 23  10 - 40 U/L Final    WBC 09/14/2023 8.81  3.90 - 12.70 K/uL Final    RBC 09/14/2023 3.73 (L)  4.00 - 5.40 M/uL Final    Hemoglobin 09/14/2023 11.2 (L)  12.0 - 16.0 g/dL Final    Hematocrit 09/14/2023 33.1 (L)  37.0 - 48.5 % Final    MCV 09/14/2023 89  82 - 98 fL Final    MCH 09/14/2023 30.0  27.0 - 31.0 pg Final    MCHC 09/14/2023 33.8  32.0 - 36.0 g/dL Final    RDW 09/14/2023 12.5  11.5 - 14.5 % Final    Platelets 09/14/2023 208  150 -  450 K/uL Final    MPV 09/14/2023 11.8  9.2 - 12.9 fL Final    Immature Granulocytes 09/14/2023 0.7 (H)  0.0 - 0.5 % Final    Gran # (ANC) 09/14/2023 6.2  1.8 - 7.7 K/uL Final    Immature Grans (Abs) 09/14/2023 0.06 (H)  0.00 - 0.04 K/uL Final    Comment: Mild elevation in immature granulocytes is non specific and   can be seen in a variety of conditions including stress response,   acute inflammation, trauma and pregnancy. Correlation with other   laboratory and clinical findings is essential.      Lymph # 09/14/2023 1.6  1.0 - 4.8 K/uL Final    Mono # 09/14/2023 0.7  0.3 - 1.0 K/uL Final    Eos # 09/14/2023 0.2  0.0 - 0.5 K/uL Final    Baso # 09/14/2023 0.03  0.00 - 0.20 K/uL Final    nRBC 09/14/2023 0  0 /100 WBC Final    Gran % 09/14/2023 70.5  38.0 - 73.0 % Final    Lymph % 09/14/2023 18.0  18.0 - 48.0 % Final    Mono % 09/14/2023 8.2  4.0 - 15.0 % Final    Eosinophil % 09/14/2023 2.3  0.0 - 8.0 % Final    Basophil % 09/14/2023 0.3  0.0 - 1.9 % Final    Differential Method 09/14/2023 Automated   Final   Routine Prenatal on 09/14/2023   Component Date Value Ref Range Status    Protein, Urine Random 09/14/2023 8  0 - 15 mg/dL Final    Creatinine, Urine 09/14/2023 53.0  15.0 - 325.0 mg/dL Final    Prot/Creat Ratio, Urine 09/14/2023 0.15  0.00 - 0.20 Final   Admission on 08/27/2023, Discharged on 08/27/2023   Component Date Value Ref Range Status    Color, UA 08/27/2023 Yellow   Final    Spec Grav UA 08/27/2023 1.005   Final    pH, UA 08/27/2023 7   Final    WBC, UA 08/27/2023 neg   Final    Nitrite, UA 08/27/2023 neg   Final    Protein, POC 08/27/2023 trace (A)   Final    Glucose, UA 08/27/2023 norm   Final    Ketones, UA 08/27/2023 neg   Final    Urobilinogen, UA 08/27/2023 1 (A)   Final    Bilirubin, POC 08/27/2023 neg   Final    Blood, UA 08/27/2023 neg   Final    WBC 08/27/2023 7.12  3.90 - 12.70 K/uL Final    RBC 08/27/2023 3.49 (L)  4.00 - 5.40 M/uL Final    Hemoglobin 08/27/2023 10.7 (L)  12.0 - 16.0 g/dL  Final    Hematocrit 08/27/2023 30.1 (L)  37.0 - 48.5 % Final    MCV 08/27/2023 86  82 - 98 fL Final    MCH 08/27/2023 30.7  27.0 - 31.0 pg Final    MCHC 08/27/2023 35.5  32.0 - 36.0 g/dL Final    RDW 08/27/2023 12.9  11.5 - 14.5 % Final    Platelets 08/27/2023 195  150 - 450 K/uL Final    MPV 08/27/2023 11.7  9.2 - 12.9 fL Final    Immature Granulocytes 08/27/2023 0.6 (H)  0.0 - 0.5 % Final    Gran # (ANC) 08/27/2023 5.1  1.8 - 7.7 K/uL Final    Immature Grans (Abs) 08/27/2023 0.04  0.00 - 0.04 K/uL Final    Comment: Mild elevation in immature granulocytes is non specific and   can be seen in a variety of conditions including stress response,   acute inflammation, trauma and pregnancy. Correlation with other   laboratory and clinical findings is essential.      Lymph # 08/27/2023 1.4  1.0 - 4.8 K/uL Final    Mono # 08/27/2023 0.5  0.3 - 1.0 K/uL Final    Eos # 08/27/2023 0.1  0.0 - 0.5 K/uL Final    Baso # 08/27/2023 0.03  0.00 - 0.20 K/uL Final    nRBC 08/27/2023 0  0 /100 WBC Final    Gran % 08/27/2023 71.1  38.0 - 73.0 % Final    Lymph % 08/27/2023 19.5  18.0 - 48.0 % Final    Mono % 08/27/2023 6.9  4.0 - 15.0 % Final    Eosinophil % 08/27/2023 1.5  0.0 - 8.0 % Final    Basophil % 08/27/2023 0.4  0.0 - 1.9 % Final    Differential Method 08/27/2023 Automated   Final    Sodium 08/27/2023 138  136 - 145 mmol/L Final    Potassium 08/27/2023 2.7 (LL)  3.5 - 5.1 mmol/L Final    Comment: K critical result(s) called and verbal readback obtained from Margot Dean RN. by [email or web address] 08/27/2023 14:31      Chloride 08/27/2023 104  95 - 110 mmol/L Final    CO2 08/27/2023 23  23 - 29 mmol/L Final    Glucose 08/27/2023 85  70 - 110 mg/dL Final    BUN 08/27/2023 3 (L)  6 - 20 mg/dL Final    Creatinine 08/27/2023 0.6  0.5 - 1.4 mg/dL Final    Calcium 08/27/2023 8.6 (L)  8.7 - 10.5 mg/dL Final    Total Protein 08/27/2023 6.1  6.0 - 8.4 g/dL Final    Albumin 08/27/2023 2.8 (L)  3.5 - 5.2 g/dL Final    Total Bilirubin 08/27/2023 0.6   0.1 - 1.0 mg/dL Final    Comment: For infants and newborns, interpretation of results should be based  on gestational age, weight and in agreement with clinical  observations.    Premature Infant recommended reference ranges:  Up to 24 hours.............<8.0 mg/dL  Up to 48 hours............<12.0 mg/dL  3-5 days..................<15.0 mg/dL  6-29 days.................<15.0 mg/dL      Alkaline Phosphatase 08/27/2023 61  55 - 135 U/L Final    AST 08/27/2023 17  10 - 40 U/L Final    ALT 08/27/2023 12  10 - 44 U/L Final    eGFR 08/27/2023 >60  >60 mL/min/1.73 m^2 Final    Anion Gap 08/27/2023 11  8 - 16 mmol/L Final    Protein, Urine Random 08/27/2023 13  0 - 15 mg/dL Final    Creatinine, Urine 08/27/2023 92.6  15.0 - 325.0 mg/dL Final    Prot/Creat Ratio, Urine 08/27/2023 0.14  0.00 - 0.20 Final   Admission on 08/23/2023, Discharged on 08/23/2023   Component Date Value Ref Range Status    Color, UA 08/23/2023 Yellow   Final    Spec Grav UA 08/23/2023 1.000   Final    pH, UA 08/23/2023 7   Final    WBC, UA 08/23/2023 neg   Final    Nitrite, UA 08/23/2023 neg   Final    Protein, POC 08/23/2023 trace   Final    Glucose, UA 08/23/2023 normal   Final    Ketones, UA 08/23/2023 neg   Final    Urobilinogen, UA 08/23/2023 normal   Final    Bilirubin, POC 08/23/2023 neg   Final    Blood, UA 08/23/2023 neg   Final   Lab Visit on 08/14/2023   Component Date Value Ref Range Status    HIV 1/2 Ag/Ab 08/14/2023 Non-reactive  Non-reactive Final    RPR 08/14/2023 Non-reactive  Non-reactive Final    WBC 08/14/2023 7.60  3.90 - 12.70 K/uL Final    RBC 08/14/2023 3.59 (L)  4.00 - 5.40 M/uL Final    Hemoglobin 08/14/2023 10.9 (L)  12.0 - 16.0 g/dL Final    Hematocrit 08/14/2023 32.0 (L)  37.0 - 48.5 % Final    MCV 08/14/2023 89  82 - 98 fL Final    MCH 08/14/2023 30.4  27.0 - 31.0 pg Final    MCHC 08/14/2023 34.1  32.0 - 36.0 g/dL Final    RDW 08/14/2023 13.1  11.5 - 14.5 % Final    Platelets 08/14/2023 213  150 - 450 K/uL Final    MPV  08/14/2023 11.2  9.2 - 12.9 fL Final    Immature Granulocytes 08/14/2023 0.8 (H)  0.0 - 0.5 % Final    Gran # (ANC) 08/14/2023 5.6  1.8 - 7.7 K/uL Final    Immature Grans (Abs) 08/14/2023 0.06 (H)  0.00 - 0.04 K/uL Final    Comment: Mild elevation in immature granulocytes is non specific and   can be seen in a variety of conditions including stress response,   acute inflammation, trauma and pregnancy. Correlation with other   laboratory and clinical findings is essential.      Lymph # 08/14/2023 1.3  1.0 - 4.8 K/uL Final    Mono # 08/14/2023 0.5  0.3 - 1.0 K/uL Final    Eos # 08/14/2023 0.2  0.0 - 0.5 K/uL Final    Baso # 08/14/2023 0.03  0.00 - 0.20 K/uL Final    nRBC 08/14/2023 0  0 /100 WBC Final    Gran % 08/14/2023 73.0  38.0 - 73.0 % Final    Lymph % 08/14/2023 16.8 (L)  18.0 - 48.0 % Final    Mono % 08/14/2023 6.6  4.0 - 15.0 % Final    Eosinophil % 08/14/2023 2.4  0.0 - 8.0 % Final    Basophil % 08/14/2023 0.4  0.0 - 1.9 % Final    Differential Method 08/14/2023 Automated   Final    Iron 08/14/2023 83  30 - 160 ug/dL Final    Transferrin 08/14/2023 344  200 - 375 mg/dL Final    TIBC 08/14/2023 509 (H)  250 - 450 ug/dL Final    Saturated Iron 08/14/2023 16 (L)  20 - 50 % Final    Ferritin 08/14/2023 19 (L)  20.0 - 300.0 ng/mL Final   There may be more visits with results that are not included.        History reviewed. No pertinent past medical history.  History reviewed. No pertinent surgical history.    Current Outpatient Medications:     acetaminophen (TYLENOL) 325 MG tablet, Take 2 tablets (650 mg total) by mouth every 6 (six) hours as needed for Pain., Disp: 30 tablet, Rfl: 0    docusate sodium (COLACE) 100 MG capsule, Take 2 capsules (200 mg total) by mouth 2 (two) times daily as needed for Constipation., Disp: 30 capsule, Rfl: 0    famotidine (PEPCID) 20 MG tablet, Take 1 tablet (20 mg total) by mouth 2 (two) times daily., Disp: 60 tablet, Rfl: 11    ferrous sulfate (IRON) 325 mg (65 mg iron) Tab  tablet, Take 1 tablet (325 mg total) by mouth daily with breakfast., Disp: 30 tablet, Rfl: 0    ibuprofen (ADVIL,MOTRIN) 600 MG tablet, Take 1 tablet (600 mg total) by mouth every 6 (six) hours as needed for Pain., Disp: 30 tablet, Rfl: 0    metoclopramide HCl (REGLAN) 10 MG tablet, Take 1 tablet (10 mg total) by mouth every 8 (eight) hours as needed (nausea). Alternate with Zofran every 4 hours, Disp: 90 tablet, Rfl: 1    NIFEdipine (PROCARDIA-XL) 30 MG (OSM) 24 hr tablet, Take 1 tablet (30 mg total) by mouth once daily., Disp: 30 tablet, Rfl: 11  Review of patient's allergies indicates:  No Known Allergies  OB History    Para Term  AB Living   1 1 1     1   SAB IAB Ectopic Multiple Live Births         0 1      # Outcome Date GA Lbr Lonnie/2nd Weight Sex Delivery Anes PTL Lv   1 Term 23 37w3d 11:36 / 00:37 2.49 kg (5 lb 7.8 oz) M Vag-Spont EPI N GREGORIA     Social History     Tobacco Use    Smoking status: Never     Passive exposure: Never    Smokeless tobacco: Never   Substance Use Topics    Alcohol use: Not Currently    Drug use: Not Currently     Types: Marijuana     Family History   Problem Relation Age of Onset    Breast cancer Maternal Grandmother     Stomach cancer Father     Colon cancer Neg Hx     Ovarian cancer Neg Hx        Review of Systems   Negative except as in HPI      Physical Exam   Vitals:    10/06/23 0920   BP: 120/80     Body mass index is 22.33 kg/m².    Physical Exam  Constitutional:       General: She is not in acute distress.     Appearance: Normal appearance.   HENT:      Head: Normocephalic and atraumatic.   Pulmonary:      Effort: Pulmonary effort is normal.   Musculoskeletal:         General: Normal range of motion.      Cervical back: Normal range of motion.   Neurological:      General: No focal deficit present.      Mental Status: She is alert and oriented to person, place, and time.   Skin:     General: Skin is warm and dry.   Psychiatric:         Mood and Affect: Mood  normal.         Behavior: Behavior normal.         Thought Content: Thought content normal.          Labs reviewed: CBC, CMP, P:C, Pap, HPV    ASSESSMENT:   Patient Active Problem List   Diagnosis    Severe pre-eclampsia in third trimester    Supervision of high-risk pregnancy, third trimester     (spontaneous vaginal delivery)    Encounter for postpartum visit       PLAN:  Problem List Items Addressed This Visit          Obstetric    Severe pre-eclampsia in third trimester     (spontaneous vaginal delivery)    Encounter for postpartum visit - Primary    Current Assessment & Plan     BP normal today, no s/sx of severe PreE. Continue Procardia XL 30 mg daily. No s/sx of PP depression, EPDS score 0. Would like Mirena IUD placed at 6 week PP visit. Device ordered today. Continue pelvic rest until then.           Other Visit Diagnoses       Encounter for counseling regarding contraception        Relevant Orders    Device Authorization Order    Breast feeding status of mother        Relevant Orders    BREAST PUMP FOR HOME USE             Total time spent on this encounter was 20 minutes.  This includes preparing to see the patient;  obtaining/reviewing separately obtained history;  performing a medical exam and/or evaluation;   counseling/educating the patient;  ordering medications, tests, of procedures;   referring/communicating with other health care professionals;  EMR documentation;  interpreting/communicating results to the patient;  and/or care coordination.    Follow up in about 5 weeks (around 11/10/2023) for Postpartum.       Vanesa Allen MD  Department of Obstetrics & Gynecology  [facility]

## 2023-10-07 PROBLEM — E87.6 HYPOKALEMIA: Status: RESOLVED | Noted: 2023-09-18 | Resolved: 2023-10-07

## 2023-10-07 PROBLEM — N76.0 BACTERIAL VAGINOSIS: Status: RESOLVED | Noted: 2023-06-05 | Resolved: 2023-10-07

## 2023-10-07 PROBLEM — Z67.91 RH NEGATIVE STATE IN ANTEPARTUM PERIOD, THIRD TRIMESTER: Status: RESOLVED | Noted: 2023-03-27 | Resolved: 2023-10-07

## 2023-10-07 PROBLEM — O26.893 RH NEGATIVE STATE IN ANTEPARTUM PERIOD, THIRD TRIMESTER: Status: RESOLVED | Noted: 2023-03-27 | Resolved: 2023-10-07

## 2023-10-07 PROBLEM — B96.89 BACTERIAL VAGINOSIS: Status: RESOLVED | Noted: 2023-06-05 | Resolved: 2023-10-07

## 2023-10-07 NOTE — ASSESSMENT & PLAN NOTE
BP normal today, no s/sx of severe PreE. Continue Procardia XL 30 mg daily. No s/sx of PP depression, EPDS score 0. Would like Mirena IUD placed at 6 week PP visit. Device ordered today. Continue pelvic rest until then.

## 2023-11-06 ENCOUNTER — POSTPARTUM VISIT (OUTPATIENT)
Dept: OBSTETRICS AND GYNECOLOGY | Facility: CLINIC | Age: 28
End: 2023-11-06
Payer: COMMERCIAL

## 2023-11-06 VITALS
BODY MASS INDEX: 24.1 KG/M2 | WEIGHT: 141.13 LBS | SYSTOLIC BLOOD PRESSURE: 120 MMHG | HEIGHT: 64 IN | DIASTOLIC BLOOD PRESSURE: 84 MMHG

## 2023-11-06 DIAGNOSIS — Z30.012 COUNSELING FOR BIRTH CONTROL, EMERGENCY CONTRACEPTIVE PRESCRIPTION: ICD-10-CM

## 2023-11-06 DIAGNOSIS — O14.13 SEVERE PRE-ECLAMPSIA IN THIRD TRIMESTER: ICD-10-CM

## 2023-11-06 DIAGNOSIS — B37.31 YEAST VAGINITIS: ICD-10-CM

## 2023-11-06 DIAGNOSIS — Z30.430 ENCOUNTER FOR IUD INSERTION: ICD-10-CM

## 2023-11-06 LAB
B-HCG UR QL: NEGATIVE
CTP QC/QA: YES

## 2023-11-06 PROCEDURE — 58300 INSERTION OF IUD: ICD-10-PCS | Mod: S$GLB,,, | Performed by: OBSTETRICS & GYNECOLOGY

## 2023-11-06 PROCEDURE — 81025 URINE PREGNANCY TEST: CPT | Mod: PBBFAC | Performed by: OBSTETRICS & GYNECOLOGY

## 2023-11-06 PROCEDURE — 58300 INSERT INTRAUTERINE DEVICE: CPT | Mod: PBBFAC | Performed by: OBSTETRICS & GYNECOLOGY

## 2023-11-06 PROCEDURE — 99999 PR PBB SHADOW E&M-EST. PATIENT-LVL III: CPT | Mod: PBBFAC,,, | Performed by: OBSTETRICS & GYNECOLOGY

## 2023-11-06 PROCEDURE — 99999 PR PBB SHADOW E&M-EST. PATIENT-LVL III: ICD-10-PCS | Mod: PBBFAC,,, | Performed by: OBSTETRICS & GYNECOLOGY

## 2023-11-06 PROCEDURE — 81025 POCT URINE PREGNANCY: ICD-10-PCS | Mod: S$GLB,,, | Performed by: OBSTETRICS & GYNECOLOGY

## 2023-11-06 PROCEDURE — 99213 OFFICE O/P EST LOW 20 MIN: CPT | Mod: PBBFAC,TH | Performed by: OBSTETRICS & GYNECOLOGY

## 2023-11-06 RX ORDER — FLUCONAZOLE 150 MG/1
150 TABLET ORAL
Qty: 2 TABLET | Refills: 0 | Status: SHIPPED | OUTPATIENT
Start: 2023-11-06 | End: 2023-11-10

## 2023-11-06 RX ORDER — FLUCONAZOLE 150 MG/1
150 TABLET ORAL
Qty: 3 TABLET | Refills: 0 | Status: SHIPPED | OUTPATIENT
Start: 2023-11-06 | End: 2023-11-06

## 2023-11-06 RX ADMIN — LEVONORGESTREL 1 INTRA UTERINE DEVICE: 52 INTRAUTERINE DEVICE INTRAUTERINE at 09:11

## 2023-11-06 NOTE — PROCEDURES
Insertion of IUD    Date/Time: 11/6/2023 9:30 AM    Performed by: Vanesa Allen MD  Authorized by: Vanesa Allen MD    Consent:     Consent given by:  Patient    Procedure risks and benefits discussed: yes      Patient questions answered: yes      Patient agrees, verbalizes understanding, and wants to proceed: yes      Educational handouts given: yes      Instructions and paperwork completed: yes    Procedure:     Pelvic exam performed: yes      Negative urine pregnancy test: yes      Cervix cleaned and prepped: yes      Speculum placed in vagina: yes      Tenaculum applied to cervix: yes      Uterus sounded: yes      Uterus sound depth (cm):  7    IUD inserted with no complications: yes      IUD type:  Mirena    Strings trimmed: yes    1 Intra Uterine Device levonorgestreL 21 mcg/24 hours (8 yrs) 52 mg     Post-procedure:     Patient tolerated procedure well: yes    Comments:      Patient to follow up in 6 weeks for string check.     Exp: 10/1/2025  Lot#: [id number]    Patient was counseled of risks to procedure including but not limited to pain, bleeding, infection, IUD perforation requiring abdominal surgery for removal, explusion, migration, need for hysteroscopic removal, pregnancy and risk of ectopic. Patient consents and signed Mirena form.    Counseling lasted approximately 15 minutes and all her questions were answered.    Bimanual exam confirmed uterine position.  Vaginal speculum inserted.  Cervix prepped with hibiclens.  Cervix grasped with tenaculum at 12 o'clock.  Mirena device fitted to sounded depth and inserted without difficulty.   IUD strings cut 3 cm from cervical os.  Tenaculum removed.  Tenaculum site hemostatic with pressure and silver nitrate. All instruments removed from patient's vagina and she tolerated the procedure well. RTC 6 weeks for string check.      Vanesa Allen MD  Department of Obstetrics & Gynecology  [facility]

## 2023-11-06 NOTE — PROGRESS NOTES
Chief Complaint   Patient presents with    Contraception     IUD PLACEMENT        HPI:   28 y.o.  here today for 6 week PP visit and Mirena IUD insertion. She is s/p  on 23, IOL for GHTN and subsequently developed PreE w/ SF. She was discharged on Procardia 30 mg daily and is no longer taking it. Denies HA, scotoma, RUQ/epigastric pain, or other s/sx of severe PreE. Delivered a VMI ([place]) 5#8 oz, Apgars 9/9. Repair of first degree laceration. Had PP IUD insertion, however IUD fell out prior to hospital discharge, otherwise uncomplicated PP course. She has not had a menstrual period since delivery but denies continued bleeding or cramping. She is no longer breast feeding, stopped about 2 weeks ago.  Denies depression, anhedonia, SI/HI. Going back to work in January.     Labs / Significant Studies  Pap (3/2023): NILM/HPV neg    Admission on 2023, Discharged on 10/02/2023   Component Date Value Ref Range Status    WBC 2023 8.73  3.90 - 12.70 K/uL Final    RBC 2023 3.63 (L)  4.00 - 5.40 M/uL Final    Hemoglobin 2023 10.9 (L)  12.0 - 16.0 g/dL Final    Hematocrit 2023 31.1 (L)  37.0 - 48.5 % Final    MCV 2023 86  82 - 98 fL Final    MCH 2023 30.0  27.0 - 31.0 pg Final    MCHC 2023 35.0  32.0 - 36.0 g/dL Final    RDW 2023 12.7  11.5 - 14.5 % Final    Platelets 2023 205  150 - 450 K/uL Final    MPV 2023 12.4  9.2 - 12.9 fL Final    Immature Granulocytes 2023 0.6 (H)  0.0 - 0.5 % Final    Gran # (ANC) 2023 6.3  1.8 - 7.7 K/uL Final    Immature Grans (Abs) 2023 0.05 (H)  0.00 - 0.04 K/uL Final    Comment: Mild elevation in immature granulocytes is non specific and   can be seen in a variety of conditions including stress response,   acute inflammation, trauma and pregnancy. Correlation with other   laboratory and clinical findings is essential.      Lymph # 2023 1.7  1.0 - 4.8 K/uL Final    Mono # 2023 0.5  0.3  - 1.0 K/uL Final    Eos # 09/28/2023 0.1  0.0 - 0.5 K/uL Final    Baso # 09/28/2023 0.03  0.00 - 0.20 K/uL Final    nRBC 09/28/2023 0  0 /100 WBC Final    Gran % 09/28/2023 72.5  38.0 - 73.0 % Final    Lymph % 09/28/2023 19.0  18.0 - 48.0 % Final    Mono % 09/28/2023 6.1  4.0 - 15.0 % Final    Eosinophil % 09/28/2023 1.5  0.0 - 8.0 % Final    Basophil % 09/28/2023 0.3  0.0 - 1.9 % Final    Differential Method 09/28/2023 Automated   Final    Sodium 09/28/2023 137  136 - 145 mmol/L Final    Potassium 09/28/2023 2.9 (L)  3.5 - 5.1 mmol/L Final    Chloride 09/28/2023 103  95 - 110 mmol/L Final    CO2 09/28/2023 21 (L)  23 - 29 mmol/L Final    Glucose 09/28/2023 93  70 - 110 mg/dL Final    BUN 09/28/2023 4 (L)  6 - 20 mg/dL Final    Creatinine 09/28/2023 0.7  0.5 - 1.4 mg/dL Final    Calcium 09/28/2023 8.5 (L)  8.7 - 10.5 mg/dL Final    Total Protein 09/28/2023 6.5  6.0 - 8.4 g/dL Final    Albumin 09/28/2023 2.7 (L)  3.5 - 5.2 g/dL Final    Total Bilirubin 09/28/2023 0.4  0.1 - 1.0 mg/dL Final    Comment: For infants and newborns, interpretation of results should be based  on gestational age, weight and in agreement with clinical  observations.    Premature Infant recommended reference ranges:  Up to 24 hours.............<8.0 mg/dL  Up to 48 hours............<12.0 mg/dL  3-5 days..................<15.0 mg/dL  6-29 days.................<15.0 mg/dL      Alkaline Phosphatase 09/28/2023 82  55 - 135 U/L Final    AST 09/28/2023 25  10 - 40 U/L Final    ALT 09/28/2023 22  10 - 44 U/L Final    eGFR 09/28/2023 >60  >60 mL/min/1.73 m^2 Final    Anion Gap 09/28/2023 13  8 - 16 mmol/L Final    Group & Rh 09/28/2023 A NEG   Final    Indirect Davian 09/28/2023 POS (A)   Final    Specimen Outdate 09/28/2023 10/01/2023 23:59   Final    Protein, Urine Random 09/28/2023 9  0 - 15 mg/dL Final    Creatinine, Urine 09/28/2023 66.7  15.0 - 325.0 mg/dL Final    Prot/Creat Ratio, Urine 09/28/2023 0.13  0.00 - 0.20 Final    Antibody ID  09/28/2023 POS   Final    Rhogam Antibody    WBC 10/01/2023 16.61 (H)  3.90 - 12.70 K/uL Final    RBC 10/01/2023 3.25 (L)  4.00 - 5.40 M/uL Final    Hemoglobin 10/01/2023 9.9 (L)  12.0 - 16.0 g/dL Final    Hematocrit 10/01/2023 28.2 (L)  37.0 - 48.5 % Final    MCV 10/01/2023 87  82 - 98 fL Final    MCH 10/01/2023 30.5  27.0 - 31.0 pg Final    MCHC 10/01/2023 35.1  32.0 - 36.0 g/dL Final    RDW 10/01/2023 12.8  11.5 - 14.5 % Final    Platelets 10/01/2023 185  150 - 450 K/uL Final    MPV 10/01/2023 11.6  9.2 - 12.9 fL Final    Immature Granulocytes 10/01/2023 0.7 (H)  0.0 - 0.5 % Final    Gran # (ANC) 10/01/2023 13.1 (H)  1.8 - 7.7 K/uL Final    Immature Grans (Abs) 10/01/2023 0.11 (H)  0.00 - 0.04 K/uL Final    Comment: Mild elevation in immature granulocytes is non specific and   can be seen in a variety of conditions including stress response,   acute inflammation, trauma and pregnancy. Correlation with other   laboratory and clinical findings is essential.      Lymph # 10/01/2023 2.2  1.0 - 4.8 K/uL Final    Mono # 10/01/2023 1.0  0.3 - 1.0 K/uL Final    Eos # 10/01/2023 0.2  0.0 - 0.5 K/uL Final    Baso # 10/01/2023 0.03  0.00 - 0.20 K/uL Final    nRBC 10/01/2023 0  0 /100 WBC Final    Gran % 10/01/2023 78.6 (H)  38.0 - 73.0 % Final    Lymph % 10/01/2023 12.9 (L)  18.0 - 48.0 % Final    Mono % 10/01/2023 6.2  4.0 - 15.0 % Final    Eosinophil % 10/01/2023 1.4  0.0 - 8.0 % Final    Basophil % 10/01/2023 0.2  0.0 - 1.9 % Final    Differential Method 10/01/2023 Automated   Final   Routine Prenatal on 09/25/2023   Component Date Value Ref Range Status    Protein, Urine Random 09/25/2023 <7  0 - 15 mg/dL Final    Creatinine, Urine 09/25/2023 44.0  15.0 - 325.0 mg/dL Final    Prot/Creat Ratio, Urine 09/25/2023 Unable to calculate  0.00 - 0.20 Final   Routine Prenatal on 09/22/2023   Component Date Value Ref Range Status    Strep B Culture 09/22/2023 No Group B Streptococcus isolated   Final   Routine Prenatal on  09/18/2023   Component Date Value Ref Range Status    Protein, Urine Random 09/18/2023 11  0 - 15 mg/dL Final    Creatinine, Urine 09/18/2023 101.0  15.0 - 325.0 mg/dL Final    Prot/Creat Ratio, Urine 09/18/2023 0.11  0.00 - 0.20 Final   Lab Visit on 09/18/2023   Component Date Value Ref Range Status    WBC 09/18/2023 9.10  3.90 - 12.70 K/uL Final    RBC 09/18/2023 3.51 (L)  4.00 - 5.40 M/uL Final    Hemoglobin 09/18/2023 10.6 (L)  12.0 - 16.0 g/dL Final    Hematocrit 09/18/2023 30.9 (L)  37.0 - 48.5 % Final    MCV 09/18/2023 88  82 - 98 fL Final    MCH 09/18/2023 30.2  27.0 - 31.0 pg Final    MCHC 09/18/2023 34.3  32.0 - 36.0 g/dL Final    RDW 09/18/2023 12.4  11.5 - 14.5 % Final    Platelets 09/18/2023 191  150 - 450 K/uL Final    MPV 09/18/2023 11.6  9.2 - 12.9 fL Final    Immature Granulocytes 09/18/2023 0.8 (H)  0.0 - 0.5 % Final    Gran # (ANC) 09/18/2023 6.6  1.8 - 7.7 K/uL Final    Immature Grans (Abs) 09/18/2023 0.07 (H)  0.00 - 0.04 K/uL Final    Comment: Mild elevation in immature granulocytes is non specific and   can be seen in a variety of conditions including stress response,   acute inflammation, trauma and pregnancy. Correlation with other   laboratory and clinical findings is essential.      Lymph # 09/18/2023 1.6  1.0 - 4.8 K/uL Final    Mono # 09/18/2023 0.6  0.3 - 1.0 K/uL Final    Eos # 09/18/2023 0.1  0.0 - 0.5 K/uL Final    Baso # 09/18/2023 0.04  0.00 - 0.20 K/uL Final    nRBC 09/18/2023 0  0 /100 WBC Final    Gran % 09/18/2023 72.9  38.0 - 73.0 % Final    Lymph % 09/18/2023 17.7 (L)  18.0 - 48.0 % Final    Mono % 09/18/2023 6.7  4.0 - 15.0 % Final    Eosinophil % 09/18/2023 1.5  0.0 - 8.0 % Final    Basophil % 09/18/2023 0.4  0.0 - 1.9 % Final    Differential Method 09/18/2023 Automated   Final    Sodium 09/18/2023 137  136 - 145 mmol/L Final    Potassium 09/18/2023 2.8 (L)  3.5 - 5.1 mmol/L Final    Chloride 09/18/2023 101  95 - 110 mmol/L Final    CO2 09/18/2023 25  23 - 29 mmol/L Final     Glucose 09/18/2023 97  70 - 110 mg/dL Final    BUN 09/18/2023 5 (L)  6 - 20 mg/dL Final    Creatinine 09/18/2023 0.8  0.5 - 1.4 mg/dL Final    Calcium 09/18/2023 8.4 (L)  8.7 - 10.5 mg/dL Final    Total Protein 09/18/2023 6.3  6.0 - 8.4 g/dL Final    Albumin 09/18/2023 2.8 (L)  3.5 - 5.2 g/dL Final    Total Bilirubin 09/18/2023 0.3  0.1 - 1.0 mg/dL Final    Comment: For infants and newborns, interpretation of results should be based  on gestational age, weight and in agreement with clinical  observations.    Premature Infant recommended reference ranges:  Up to 24 hours.............<8.0 mg/dL  Up to 48 hours............<12.0 mg/dL  3-5 days..................<15.0 mg/dL  6-29 days.................<15.0 mg/dL      Alkaline Phosphatase 09/18/2023 74  55 - 135 U/L Final    AST 09/18/2023 20  10 - 40 U/L Final    ALT 09/18/2023 18  10 - 44 U/L Final    eGFR 09/18/2023 >60  >60 mL/min/1.73 m^2 Final    Anion Gap 09/18/2023 11  8 - 16 mmol/L Final   Lab Visit on 09/14/2023   Component Date Value Ref Range Status    Sodium 09/14/2023 137  136 - 145 mmol/L Final    Potassium 09/14/2023 2.9 (L)  3.5 - 5.1 mmol/L Final    Chloride 09/14/2023 102  95 - 110 mmol/L Final    CO2 09/14/2023 27  23 - 29 mmol/L Final    Glucose 09/14/2023 80  70 - 110 mg/dL Final    BUN 09/14/2023 3 (L)  6 - 20 mg/dL Final    Creatinine 09/14/2023 0.6  0.5 - 1.4 mg/dL Final    Calcium 09/14/2023 9.0  8.7 - 10.5 mg/dL Final    Anion Gap 09/14/2023 8  8 - 16 mmol/L Final    eGFR 09/14/2023 >60  >60 mL/min/1.73 m^2 Final    AST 09/14/2023 23  10 - 40 U/L Final    WBC 09/14/2023 8.81  3.90 - 12.70 K/uL Final    RBC 09/14/2023 3.73 (L)  4.00 - 5.40 M/uL Final    Hemoglobin 09/14/2023 11.2 (L)  12.0 - 16.0 g/dL Final    Hematocrit 09/14/2023 33.1 (L)  37.0 - 48.5 % Final    MCV 09/14/2023 89  82 - 98 fL Final    MCH 09/14/2023 30.0  27.0 - 31.0 pg Final    MCHC 09/14/2023 33.8  32.0 - 36.0 g/dL Final    RDW 09/14/2023 12.5  11.5 - 14.5 % Final     Platelets 09/14/2023 208  150 - 450 K/uL Final    MPV 09/14/2023 11.8  9.2 - 12.9 fL Final    Immature Granulocytes 09/14/2023 0.7 (H)  0.0 - 0.5 % Final    Gran # (ANC) 09/14/2023 6.2  1.8 - 7.7 K/uL Final    Immature Grans (Abs) 09/14/2023 0.06 (H)  0.00 - 0.04 K/uL Final    Comment: Mild elevation in immature granulocytes is non specific and   can be seen in a variety of conditions including stress response,   acute inflammation, trauma and pregnancy. Correlation with other   laboratory and clinical findings is essential.      Lymph # 09/14/2023 1.6  1.0 - 4.8 K/uL Final    Mono # 09/14/2023 0.7  0.3 - 1.0 K/uL Final    Eos # 09/14/2023 0.2  0.0 - 0.5 K/uL Final    Baso # 09/14/2023 0.03  0.00 - 0.20 K/uL Final    nRBC 09/14/2023 0  0 /100 WBC Final    Gran % 09/14/2023 70.5  38.0 - 73.0 % Final    Lymph % 09/14/2023 18.0  18.0 - 48.0 % Final    Mono % 09/14/2023 8.2  4.0 - 15.0 % Final    Eosinophil % 09/14/2023 2.3  0.0 - 8.0 % Final    Basophil % 09/14/2023 0.3  0.0 - 1.9 % Final    Differential Method 09/14/2023 Automated   Final   Routine Prenatal on 09/14/2023   Component Date Value Ref Range Status    Protein, Urine Random 09/14/2023 8  0 - 15 mg/dL Final    Creatinine, Urine 09/14/2023 53.0  15.0 - 325.0 mg/dL Final    Prot/Creat Ratio, Urine 09/14/2023 0.15  0.00 - 0.20 Final   Admission on 08/27/2023, Discharged on 08/27/2023   Component Date Value Ref Range Status    Color, UA 08/27/2023 Yellow   Final    Spec Grav UA 08/27/2023 1.005   Final    pH, UA 08/27/2023 7   Final    WBC, UA 08/27/2023 neg   Final    Nitrite, UA 08/27/2023 neg   Final    Protein, POC 08/27/2023 trace (A)   Final    Glucose, UA 08/27/2023 norm   Final    Ketones, UA 08/27/2023 neg   Final    Urobilinogen, UA 08/27/2023 1 (A)   Final    Bilirubin, POC 08/27/2023 neg   Final    Blood, UA 08/27/2023 neg   Final    WBC 08/27/2023 7.12  3.90 - 12.70 K/uL Final    RBC 08/27/2023 3.49 (L)  4.00 - 5.40 M/uL Final    Hemoglobin  08/27/2023 10.7 (L)  12.0 - 16.0 g/dL Final    Hematocrit 08/27/2023 30.1 (L)  37.0 - 48.5 % Final    MCV 08/27/2023 86  82 - 98 fL Final    MCH 08/27/2023 30.7  27.0 - 31.0 pg Final    MCHC 08/27/2023 35.5  32.0 - 36.0 g/dL Final    RDW 08/27/2023 12.9  11.5 - 14.5 % Final    Platelets 08/27/2023 195  150 - 450 K/uL Final    MPV 08/27/2023 11.7  9.2 - 12.9 fL Final    Immature Granulocytes 08/27/2023 0.6 (H)  0.0 - 0.5 % Final    Gran # (ANC) 08/27/2023 5.1  1.8 - 7.7 K/uL Final    Immature Grans (Abs) 08/27/2023 0.04  0.00 - 0.04 K/uL Final    Comment: Mild elevation in immature granulocytes is non specific and   can be seen in a variety of conditions including stress response,   acute inflammation, trauma and pregnancy. Correlation with other   laboratory and clinical findings is essential.      Lymph # 08/27/2023 1.4  1.0 - 4.8 K/uL Final    Mono # 08/27/2023 0.5  0.3 - 1.0 K/uL Final    Eos # 08/27/2023 0.1  0.0 - 0.5 K/uL Final    Baso # 08/27/2023 0.03  0.00 - 0.20 K/uL Final    nRBC 08/27/2023 0  0 /100 WBC Final    Gran % 08/27/2023 71.1  38.0 - 73.0 % Final    Lymph % 08/27/2023 19.5  18.0 - 48.0 % Final    Mono % 08/27/2023 6.9  4.0 - 15.0 % Final    Eosinophil % 08/27/2023 1.5  0.0 - 8.0 % Final    Basophil % 08/27/2023 0.4  0.0 - 1.9 % Final    Differential Method 08/27/2023 Automated   Final    Sodium 08/27/2023 138  136 - 145 mmol/L Final    Potassium 08/27/2023 2.7 (LL)  3.5 - 5.1 mmol/L Final    Comment: K critical result(s) called and verbal readback obtained from Margot Dean RN. by [email or web address] 08/27/2023 14:31      Chloride 08/27/2023 104  95 - 110 mmol/L Final    CO2 08/27/2023 23  23 - 29 mmol/L Final    Glucose 08/27/2023 85  70 - 110 mg/dL Final    BUN 08/27/2023 3 (L)  6 - 20 mg/dL Final    Creatinine 08/27/2023 0.6  0.5 - 1.4 mg/dL Final    Calcium 08/27/2023 8.6 (L)  8.7 - 10.5 mg/dL Final    Total Protein 08/27/2023 6.1  6.0 - 8.4 g/dL Final    Albumin 08/27/2023 2.8 (L)  3.5 - 5.2 g/dL  Final    Total Bilirubin 08/27/2023 0.6  0.1 - 1.0 mg/dL Final    Comment: For infants and newborns, interpretation of results should be based  on gestational age, weight and in agreement with clinical  observations.    Premature Infant recommended reference ranges:  Up to 24 hours.............<8.0 mg/dL  Up to 48 hours............<12.0 mg/dL  3-5 days..................<15.0 mg/dL  6-29 days.................<15.0 mg/dL      Alkaline Phosphatase 08/27/2023 61  55 - 135 U/L Final    AST 08/27/2023 17  10 - 40 U/L Final    ALT 08/27/2023 12  10 - 44 U/L Final    eGFR 08/27/2023 >60  >60 mL/min/1.73 m^2 Final    Anion Gap 08/27/2023 11  8 - 16 mmol/L Final    Protein, Urine Random 08/27/2023 13  0 - 15 mg/dL Final    Creatinine, Urine 08/27/2023 92.6  15.0 - 325.0 mg/dL Final    Prot/Creat Ratio, Urine 08/27/2023 0.14  0.00 - 0.20 Final   Admission on 08/23/2023, Discharged on 08/23/2023   Component Date Value Ref Range Status    Color, UA 08/23/2023 Yellow   Final    Spec Grav UA 08/23/2023 1.000   Final    pH, UA 08/23/2023 7   Final    WBC, UA 08/23/2023 neg   Final    Nitrite, UA 08/23/2023 neg   Final    Protein, POC 08/23/2023 trace   Final    Glucose, UA 08/23/2023 normal   Final    Ketones, UA 08/23/2023 neg   Final    Urobilinogen, UA 08/23/2023 normal   Final    Bilirubin, POC 08/23/2023 neg   Final    Blood, UA 08/23/2023 neg   Final   Lab Visit on 08/14/2023   Component Date Value Ref Range Status    HIV 1/2 Ag/Ab 08/14/2023 Non-reactive  Non-reactive Final    RPR 08/14/2023 Non-reactive  Non-reactive Final    WBC 08/14/2023 7.60  3.90 - 12.70 K/uL Final    RBC 08/14/2023 3.59 (L)  4.00 - 5.40 M/uL Final    Hemoglobin 08/14/2023 10.9 (L)  12.0 - 16.0 g/dL Final    Hematocrit 08/14/2023 32.0 (L)  37.0 - 48.5 % Final    MCV 08/14/2023 89  82 - 98 fL Final    MCH 08/14/2023 30.4  27.0 - 31.0 pg Final    MCHC 08/14/2023 34.1  32.0 - 36.0 g/dL Final    RDW 08/14/2023 13.1  11.5 - 14.5 % Final    Platelets  08/14/2023 213  150 - 450 K/uL Final    MPV 08/14/2023 11.2  9.2 - 12.9 fL Final    Immature Granulocytes 08/14/2023 0.8 (H)  0.0 - 0.5 % Final    Gran # (ANC) 08/14/2023 5.6  1.8 - 7.7 K/uL Final    Immature Grans (Abs) 08/14/2023 0.06 (H)  0.00 - 0.04 K/uL Final    Comment: Mild elevation in immature granulocytes is non specific and   can be seen in a variety of conditions including stress response,   acute inflammation, trauma and pregnancy. Correlation with other   laboratory and clinical findings is essential.      Lymph # 08/14/2023 1.3  1.0 - 4.8 K/uL Final    Mono # 08/14/2023 0.5  0.3 - 1.0 K/uL Final    Eos # 08/14/2023 0.2  0.0 - 0.5 K/uL Final    Baso # 08/14/2023 0.03  0.00 - 0.20 K/uL Final    nRBC 08/14/2023 0  0 /100 WBC Final    Gran % 08/14/2023 73.0  38.0 - 73.0 % Final    Lymph % 08/14/2023 16.8 (L)  18.0 - 48.0 % Final    Mono % 08/14/2023 6.6  4.0 - 15.0 % Final    Eosinophil % 08/14/2023 2.4  0.0 - 8.0 % Final    Basophil % 08/14/2023 0.4  0.0 - 1.9 % Final    Differential Method 08/14/2023 Automated   Final    Iron 08/14/2023 83  30 - 160 ug/dL Final    Transferrin 08/14/2023 344  200 - 375 mg/dL Final    TIBC 08/14/2023 509 (H)  250 - 450 ug/dL Final    Saturated Iron 08/14/2023 16 (L)  20 - 50 % Final    Ferritin 08/14/2023 19 (L)  20.0 - 300.0 ng/mL Final   There may be more visits with results that are not included.        History reviewed. No pertinent past medical history.  History reviewed. No pertinent surgical history.    Current Outpatient Medications:     acetaminophen (TYLENOL) 325 MG tablet, Take 2 tablets (650 mg total) by mouth every 6 (six) hours as needed for Pain., Disp: 30 tablet, Rfl: 0    docusate sodium (COLACE) 100 MG capsule, Take 2 capsules (200 mg total) by mouth 2 (two) times daily as needed for Constipation., Disp: 30 capsule, Rfl: 0    famotidine (PEPCID) 20 MG tablet, Take 1 tablet (20 mg total) by mouth 2 (two) times daily., Disp: 60 tablet, Rfl: 11    ferrous  sulfate (IRON) 325 mg (65 mg iron) Tab tablet, Take 1 tablet (325 mg total) by mouth daily with breakfast., Disp: 30 tablet, Rfl: 0    ibuprofen (ADVIL,MOTRIN) 600 MG tablet, Take 1 tablet (600 mg total) by mouth every 6 (six) hours as needed for Pain., Disp: 30 tablet, Rfl: 0    metoclopramide HCl (REGLAN) 10 MG tablet, Take 1 tablet (10 mg total) by mouth every 8 (eight) hours as needed (nausea). Alternate with Zofran every 4 hours, Disp: 90 tablet, Rfl: 1    NIFEdipine (PROCARDIA-XL) 30 MG (OSM) 24 hr tablet, Take 1 tablet (30 mg total) by mouth once daily., Disp: 30 tablet, Rfl: 11    fluconazole (DIFLUCAN) 150 MG Tab, Take 1 tablet (150 mg total) by mouth every 72 hours. for 2 doses, Disp: 2 tablet, Rfl: 0    Current Facility-Administered Medications:     levonorgestreL (MIRENA) 21 mcg/24 hours (8 yrs) 52 mg IUD 1 Intra Uterine Device, 1 Intra Uterine Device, Intrauterine, , Vanesa Allen MD, 1 Intra Uterine Device at 23 0930  Review of patient's allergies indicates:  No Known Allergies  OB History    Para Term  AB Living   1 1 1     1   SAB IAB Ectopic Multiple Live Births         0 1      # Outcome Date GA Lbr Lonnie/2nd Weight Sex Delivery Anes PTL Lv   1 Term 23 37w3d 11:36 / 00:37 2.49 kg (5 lb 7.8 oz) M Vag-Spont EPI N GREGORIA     Social History     Tobacco Use    Smoking status: Never     Passive exposure: Never    Smokeless tobacco: Never   Substance Use Topics    Alcohol use: Not Currently    Drug use: Not Currently     Types: Marijuana     Family History   Problem Relation Age of Onset    Breast cancer Maternal Grandmother     Stomach cancer Father     Colon cancer Neg Hx     Ovarian cancer Neg Hx        Review of Systems   Negative except as in HPI    Physical Exam   Vitals:    23 1008   BP: 120/84     Body mass index is 24.22 kg/m².    Physical Exam  Constitutional:       General: She is not in acute distress.     Appearance: Normal appearance.   Genitourinary:       Vulva, bladder and urethral meatus normal.      Vaginal discharge (clumpy white discharge in vault) present.      No vaginal erythema or bleeding.        Right Adnexa: not tender, not full and no mass present.     Left Adnexa: not tender, not full and no mass present.     No cervical motion tenderness or lesion.      Uterus is not tender.      No uterine mass detected.     Uterus is retroverted.      Bladder is not tender.       Pelvic exam was performed with patient in the lithotomy position.   HENT:      Head: Normocephalic and atraumatic.   Pulmonary:      Effort: Pulmonary effort is normal.   Abdominal:      General: Bowel sounds are normal. There is no distension.      Palpations: Abdomen is soft.      Tenderness: There is no abdominal tenderness. There is no guarding or rebound.   Musculoskeletal:         General: Normal range of motion.      Cervical back: Normal range of motion.   Neurological:      General: No focal deficit present.      Mental Status: She is alert and oriented to person, place, and time.   Skin:     General: Skin is warm and dry.   Psychiatric:         Mood and Affect: Mood normal.         Behavior: Behavior normal.         Thought Content: Thought content normal.   Exam conducted with a chaperone present.        Labs reviewed: Pap, HPV, CBC, CMP, P:C    ASSESSMENT:   Patient Active Problem List   Diagnosis    Severe pre-eclampsia in third trimester     (spontaneous vaginal delivery)    Encounter for postpartum visit    Yeast vaginitis       PLAN:  Problem List Items Addressed This Visit          Renal/    Yeast vaginitis    Relevant Medications    fluconazole (DIFLUCAN) 150 MG Tab       Obstetric    Severe pre-eclampsia in third trimester    Encounter for postpartum visit - Primary    Current Assessment & Plan     Normal pelvic exam except clumpy white discharge c/w yeast. Rx for Diflucan sent. Formula feeding. Mirena IUD for contraception, placed today without difficulty. No signs  of PP depression. Pap up to date. RTC 6 weeks for string check.    BP normal today. No longer taking Procardia, had severe PreE. Asymptomatic. Counseling and precautions reviewed.           Other Visit Diagnoses       Counseling for birth control, emergency contraceptive prescription        Relevant Orders    POCT urine pregnancy (Completed)    Encounter for IUD insertion        Relevant Medications    levonorgestreL (MIRENA) 21 mcg/24 hours (8 yrs) 52 mg IUD 1 Intra Uterine Device    Other Relevant Orders    Insertion of IUD (Completed)             Total time spent on this encounter was 20 minutes.  This includes preparing to see the patient;  obtaining/reviewing separately obtained history;  performing a medical exam and/or evaluation;   counseling/educating the patient;  ordering medications, tests, of procedures;   referring/communicating with other health care professionals;  EMR documentation;  interpreting/communicating results to the patient;  and/or care coordination.    Follow up in about 6 weeks (around 12/18/2023) for String Check.       Vanesa Allen MD  Department of Obstetrics & Gynecology  [facility]

## 2023-11-06 NOTE — ASSESSMENT & PLAN NOTE
Normal pelvic exam except clumpy white discharge c/w yeast. Rx for Diflucan sent. Formula feeding. Mirena IUD for contraception, placed today without difficulty. No signs of PP depression. Pap up to date. RTC 6 weeks for string check.    BP normal today. No longer taking Procardia, had severe PreE. Asymptomatic. Counseling and precautions reviewed.

## 2024-01-04 ENCOUNTER — TELEPHONE (OUTPATIENT)
Dept: OBSTETRICS AND GYNECOLOGY | Facility: CLINIC | Age: 29
End: 2024-01-04
Payer: COMMERCIAL

## 2024-01-04 NOTE — TELEPHONE ENCOUNTER
----- Message from Willy Cristina sent at 1/4/2024  3:45 PM CST -----  Name of Who is Calling:NACHO CHAPMAN [[id number]]                What is the request in detail: Pt calling because she she is requesting her return to work paperwork.Please call back to further assist.                 Can the clinic reply by [facility]: No                What Number to Call Back if not in [facility]: [phone]

## 2024-01-04 NOTE — LETTER
January 4, 2024    Kika Johnson  [address] [address]         [facility]  [address] [address]  Phone: [phone]  Fax: [phone] January 4, 2024     Patient: Kika Johnson   YOB: 1995   Date of Visit: 1/4/2024       To Whom It May Concern:    It is my medical opinion that Kika Johnson may return to full duty immediately with no restrictions.    If you have any questions or concerns, please don't hesitate to call.    Sincerely,    Vanesa Allen MD

## 2024-01-29 ENCOUNTER — OFFICE VISIT (OUTPATIENT)
Dept: OBSTETRICS AND GYNECOLOGY | Facility: CLINIC | Age: 29
End: 2024-01-29
Payer: COMMERCIAL

## 2024-01-29 VITALS
SYSTOLIC BLOOD PRESSURE: 120 MMHG | WEIGHT: 158.75 LBS | DIASTOLIC BLOOD PRESSURE: 80 MMHG | BODY MASS INDEX: 27.25 KG/M2

## 2024-01-29 DIAGNOSIS — Z30.431 ENCOUNTER FOR ROUTINE CHECKING OF INTRAUTERINE CONTRACEPTIVE DEVICE (IUD): Primary | ICD-10-CM

## 2024-01-29 PROCEDURE — 1160F RVW MEDS BY RX/DR IN RCRD: CPT | Mod: CPTII,S$GLB,, | Performed by: OBSTETRICS & GYNECOLOGY

## 2024-01-29 PROCEDURE — 3074F SYST BP LT 130 MM HG: CPT | Mod: CPTII,S$GLB,, | Performed by: OBSTETRICS & GYNECOLOGY

## 2024-01-29 PROCEDURE — 3079F DIAST BP 80-89 MM HG: CPT | Mod: CPTII,S$GLB,, | Performed by: OBSTETRICS & GYNECOLOGY

## 2024-01-29 PROCEDURE — 3008F BODY MASS INDEX DOCD: CPT | Mod: CPTII,S$GLB,, | Performed by: OBSTETRICS & GYNECOLOGY

## 2024-01-29 PROCEDURE — 1159F MED LIST DOCD IN RCRD: CPT | Mod: CPTII,S$GLB,, | Performed by: OBSTETRICS & GYNECOLOGY

## 2024-01-29 PROCEDURE — 99999 PR PBB SHADOW E&M-EST. PATIENT-LVL III: CPT | Mod: PBBFAC,,, | Performed by: OBSTETRICS & GYNECOLOGY

## 2024-01-29 PROCEDURE — 99213 OFFICE O/P EST LOW 20 MIN: CPT | Mod: S$GLB,,, | Performed by: OBSTETRICS & GYNECOLOGY

## 2024-02-04 PROBLEM — Z30.431 ENCOUNTER FOR ROUTINE CHECKING OF INTRAUTERINE CONTRACEPTIVE DEVICE (IUD): Status: ACTIVE | Noted: 2024-02-04

## 2024-02-05 NOTE — ASSESSMENT & PLAN NOTE
Doing well today with no complaints. Strings visualized and palpated. IUD in correct position. Counseled about possibility of amenorrhea/irregular bleeding. Patient voiced understanding. RTC 1 year for annual well woman exam, sooner PRN.

## 2024-02-05 NOTE — PROGRESS NOTES
Chief Complaint   Patient presents with    IUD Check     String check        HPI:   29 y.o.  here today for string check. Mirena IUD placed by me 23 without difficulty. She experienced some irregular bleeding and cramping that have overall improved since placement. Denies fever, chills, foul smelling discharge, or other concerns today.      LMP Dates from Last 1 Encounters:   LMP: 2023       Labs / Significant Studies  Pap (3/2023): NILM/HPV neg    Postpartum Visit on 2023   Component Date Value Ref Range Status    POC Preg Test, Ur 2023 Negative  Negative Final     Acceptable 2023 Yes   Final        History reviewed. No pertinent past medical history.  History reviewed. No pertinent surgical history.    Current Outpatient Medications:     acetaminophen (TYLENOL) 325 MG tablet, Take 2 tablets (650 mg total) by mouth every 6 (six) hours as needed for Pain., Disp: 30 tablet, Rfl: 0    docusate sodium (COLACE) 100 MG capsule, Take 2 capsules (200 mg total) by mouth 2 (two) times daily as needed for Constipation., Disp: 30 capsule, Rfl: 0    famotidine (PEPCID) 20 MG tablet, Take 1 tablet (20 mg total) by mouth 2 (two) times daily., Disp: 60 tablet, Rfl: 11    ferrous sulfate (IRON) 325 mg (65 mg iron) Tab tablet, Take 1 tablet (325 mg total) by mouth daily with breakfast., Disp: 30 tablet, Rfl: 0    ibuprofen (ADVIL,MOTRIN) 600 MG tablet, Take 1 tablet (600 mg total) by mouth every 6 (six) hours as needed for Pain., Disp: 30 tablet, Rfl: 0    metoclopramide HCl (REGLAN) 10 MG tablet, Take 1 tablet (10 mg total) by mouth every 8 (eight) hours as needed (nausea). Alternate with Zofran every 4 hours, Disp: 90 tablet, Rfl: 1    NIFEdipine (PROCARDIA-XL) 30 MG (OSM) 24 hr tablet, Take 1 tablet (30 mg total) by mouth once daily., Disp: 30 tablet, Rfl: 11    Current Facility-Administered Medications:     levonorgestreL (MIRENA) 21 mcg/24 hours (8 yrs) 52 mg IUD 1 Intra  Uterine Device, 1 Intra Uterine Device, Intrauterine, , Vanesa Allen MD, 1 Intra Uterine Device at 23 0930  Review of patient's allergies indicates:  No Known Allergies  OB History    Para Term  AB Living   1 1 1     1   SAB IAB Ectopic Multiple Live Births         0 1      # Outcome Date GA Lbr Lonnie/2nd Weight Sex Delivery Anes PTL Lv   1 Term 23 37w3d 11:36 / 00:37 2.49 kg (5 lb 7.8 oz) M Vag-Spont EPI N GREGORIA     Social History     Tobacco Use    Smoking status: Some Days     Types: Cigarettes     Passive exposure: Never    Smokeless tobacco: Never   Substance Use Topics    Alcohol use: Yes    Drug use: Not Currently     Types: Marijuana     Family History   Problem Relation Age of Onset    Breast cancer Maternal Grandmother     Stomach cancer Father     Colon cancer Neg Hx     Ovarian cancer Neg Hx        Review of Systems   Negative except as in HPI    Physical Exam   Vitals:    24 1624   BP: 120/80     Body mass index is 27.25 kg/m².    Physical Exam  Constitutional:       General: She is not in acute distress.     Appearance: Normal appearance.     Genitourinary:    Vulva, urethra, vagina and uterus normal.   The external female genitalia was normal.   No external genitalia lesions identified,Genitalia hair distrobution normal .   No vaginal discharge or bleeding in the vagina.    No vaginal prolapse present.     No vaginal atrophy present.  Right adnexum displays no mass, no tenderness and no fullness. Left adnexum displays no mass, no tenderness and no fullness. Cervix is normal. IUD strings visualized.Cervix exhibits no motion tenderness and no lesion. Uterus consistancy normal and Uerus contour normal  Uterus is not tender and no mass.    Uterus is anteverted.   HENT:      Head: Normocephalic and atraumatic.   Eyes:      Extraocular Movements: Extraocular movements intact.      Pupils: Pupils are equal, round, and reactive to light.   Pulmonary:      Effort: Pulmonary  effort is normal.   Abdominal:      General: Abdomen is flat. There is no distension.      Palpations: Abdomen is soft.      Tenderness: There is no abdominal tenderness. There is no guarding or rebound.   Musculoskeletal:         General: Normal range of motion.      Cervical back: Normal range of motion.   Neurological:      General: No focal deficit present.      Mental Status: She is alert and oriented to person, place, and time.   Skin:     General: Skin is warm and dry.   Psychiatric:         Mood and Affect: Mood normal.         Behavior: Behavior normal.         Thought Content: Thought content normal.   Vitals reviewed.          Labs reviewed: Pap, HPV, Affirm    ASSESSMENT:   Patient Active Problem List   Diagnosis    Severe pre-eclampsia in third trimester    Encounter for postpartum visit    Yeast vaginitis    Encounter for routine checking of intrauterine contraceptive device (IUD)       PLAN:  Problem List Items Addressed This Visit          Renal/    Encounter for routine checking of intrauterine contraceptive device (IUD) - Primary    Current Assessment & Plan     Doing well today with no complaints. Strings visualized and palpated. IUD in correct position. Counseled about possibility of amenorrhea/irregular bleeding. Patient voiced understanding. RTC 1 year for annual well woman exam, sooner PRN.               Total time spent on this encounter was 20 minutes.  This includes preparing to see the patient;  obtaining/reviewing separately obtained history;  performing a medical exam and/or evaluation;   counseling/educating the patient;  ordering medications, tests, of procedures;   referring/communicating with other health care professionals;  EMR documentation;  interpreting/communicating results to the patient;  and/or care coordination.    Follow up if symptoms worsen or fail to improve.       Vanesa Allen MD  Department of Obstetrics & Gynecology  [facility]

## 2024-08-19 ENCOUNTER — PATIENT MESSAGE (OUTPATIENT)
Dept: RESEARCH | Facility: HOSPITAL | Age: 29
End: 2024-08-19
Payer: COMMERCIAL

## 2024-11-10 ENCOUNTER — HOSPITAL ENCOUNTER (EMERGENCY)
Facility: OTHER | Age: 29
Discharge: HOME | End: 2024-11-10
Attending: EMERGENCY MEDICINE
Payer: COMMERCIAL

## 2024-11-10 VITALS
RESPIRATION RATE: 18 BRPM | OXYGEN SATURATION: 97 % | HEIGHT: 64 IN | BODY MASS INDEX: 29.37 KG/M2 | DIASTOLIC BLOOD PRESSURE: 74 MMHG | TEMPERATURE: 98 F | SYSTOLIC BLOOD PRESSURE: 136 MMHG | HEART RATE: 69 BPM | WEIGHT: 172 LBS

## 2024-11-10 DIAGNOSIS — R11.2 NAUSEA AND VOMITING, UNSPECIFIED VOMITING TYPE: Primary | ICD-10-CM

## 2024-11-10 LAB
B-HCG UR QL: NEGATIVE
BACTERIA #/AREA URNS HPF: ABNORMAL /HPF
BILIRUB UR QL STRIP: NEGATIVE
CLARITY UR: ABNORMAL
COLOR UR: YELLOW
CTP QC/QA: YES
GLUCOSE UR QL STRIP: ABNORMAL
GRAN CASTS #/AREA URNS LPF: 1 /LPF
HGB UR QL STRIP: ABNORMAL
HYALINE CASTS #/AREA URNS LPF: 0 /LPF
KETONES UR QL STRIP: ABNORMAL
LEUKOCYTE ESTERASE UR QL STRIP: NEGATIVE
MICROSCOPIC COMMENT: ABNORMAL
NITRITE UR QL STRIP: NEGATIVE
PH UR STRIP: 6 [PH] (ref 5–8)
PROT UR QL STRIP: ABNORMAL
RBC #/AREA URNS HPF: 3 /HPF (ref 0–4)
SP GR UR STRIP: 1.02 (ref 1–1.03)
URN SPEC COLLECT METH UR: ABNORMAL
UROBILINOGEN UR STRIP-ACNC: NEGATIVE EU/DL
WBC #/AREA URNS HPF: 35 /HPF (ref 0–5)

## 2024-11-10 PROCEDURE — 87086 URINE CULTURE/COLONY COUNT: CPT | Performed by: NURSE PRACTITIONER

## 2024-11-10 PROCEDURE — 81000 URINALYSIS NONAUTO W/SCOPE: CPT | Performed by: NURSE PRACTITIONER

## 2024-11-10 PROCEDURE — 81025 URINE PREGNANCY TEST: CPT | Performed by: NURSE PRACTITIONER

## 2024-11-10 PROCEDURE — 99283 EMERGENCY DEPT VISIT LOW MDM: CPT

## 2024-11-10 PROCEDURE — 25000003 PHARM REV CODE 250: Performed by: NURSE PRACTITIONER

## 2024-11-10 RX ORDER — ONDANSETRON 4 MG/1
4 TABLET, ORALLY DISINTEGRATING ORAL EVERY 8 HOURS PRN
Qty: 15 TABLET | Refills: 0 | Status: SHIPPED | OUTPATIENT
Start: 2024-11-10 | End: 2024-11-15

## 2024-11-10 RX ORDER — ONDANSETRON 4 MG/1
4 TABLET, ORALLY DISINTEGRATING ORAL
Status: COMPLETED | OUTPATIENT
Start: 2024-11-10 | End: 2024-11-10

## 2024-11-10 RX ADMIN — ONDANSETRON 4 MG: 4 TABLET, ORALLY DISINTEGRATING ORAL at 08:11

## 2024-11-11 NOTE — DISCHARGE INSTRUCTIONS
You were seen and evaluated in the ER today.  Your urine shows no signs of infection.  You likely have a stomach virus.  We have prescribed you Zofran for nausea.  Increase fluids and bland diet.  Please follow-up with your PCP as needed.  Please return to the ED for any worsening symptoms such as chest pain, shortness of breath, fever not controlled with Tylenol or ibuprofen or uncontrolled pain.      Our goal in the emergency department is to always give you outstanding care and exceptional service. You may receive a survey by mail or e-mail in the next week regarding your experience in our ED. We would greatly appreciate your completing and returning the survey. Your feedback provides us with a way to recognize our staff who give very good care and it helps us learn how to improve when your experience was below our aspiration of excellence.

## 2024-11-11 NOTE — ED PROVIDER NOTES
"Source of History:  Patient, chart    Chief complaint:  Abdominal Pain (Generalized abd pain w/ NV over the past 24 hours/)      HPI:  Kika Johnson is a 29 y.o. female presenting with right-sided abdominal pain with associated nausea, vomiting and diarrhea.  Patient states that she feels like she has a stomach bug.  Patient has not eaten today due to nausea.  Patient denies any fever or chills.    This is the extent to the patients complaints today here in the emergency department.    ROS: As per HPI and below:  Constitutional: No fever.  No chills.  Eyes: No visual changes.  ENT: No sore throat. No ear pain    Cardiovascular: No chest pain.  Respiratory: No shortness of breath.  GI:  Positive for nausea.  Positive for diarrhea.  Positive for vomiting.  No abdominal pain  Genitourinary: No abnormal urination.  Neurologic: No headache. No focal weakness.  No numbness.  MSK: no back pain.  Integument: No rashes or lesions.  Hematologic: No easy bruising.  Endocrine: No excessive thirst or urination.    Review of patient's allergies indicates:  No Known Allergies    PMH:  As per HPI and below:  History reviewed. No pertinent past medical history.  History reviewed. No pertinent surgical history.    Social History     Tobacco Use    Smoking status: Some Days     Types: Cigarettes     Passive exposure: Never    Smokeless tobacco: Never   Substance Use Topics    Alcohol use: Yes    Drug use: Not Currently     Types: Marijuana       Physical Exam:    BP (!) 140/80 (BP Location: Left arm)   Pulse 83   Temp 97.9 °F (36.6 °C) (Oral)   Resp 17   Ht 5' 4" (1.626 m)   Wt 78 kg (172 lb)   LMP  (LMP Unknown)   SpO2 98%   Breastfeeding No   BMI 29.52 kg/m²   Nursing note and vital signs reviewed.  Constitutional: No acute distress.  Nontoxic  Eyes: No conjunctival injection.  Extraocular muscles are intact.  ENT: Oropharynx clear.  Normal phonation.  Cardiovascular: Regular rate and rhythm.  No murmurs. No " gallops. No rubs  Respiratory: Clear to auscultation bilaterally.  Good air movement.  No wheezes.  No rhonchi. No rales. No accessory muscle use.  Abdomen:  Abdomen is soft and nontender.  No rebound or guarding appreciated on exam.  Non peritoneal.  Bowel sounds within normal limits.  Musculoskeletal: Good range of motion all joints.  No deformities.  Neck supple.  No meningismus.  Skin: No rashes seen.  Good turgor.  No abrasions.  No ecchymoses.  Neuro: alert and oriented x3,  no focal neurological deficits.  Psych: Appropriate, conversant    MDM    Emergent evaluation of a 30 yo female presenting for intermittent right-sided abdominal pain with associated nausea, vomiting and diarrhea since last night.  Patient states she feels like she was a stomach bug.  Patient denies any fever chills.  Patient denies any abdominal pain at this time.  Patient states she has not eaten today because of the nausea.  On exam pt is A&Ox3. VSS. Nonfebrile and nontoxic appearing.  Mucous membranes pink and moist.  Breath sounds clear bilaterally.  Abdomen soft and nontender. No rebound or guarding appreciated on exam.   BS WNL.  Pt speaking in full sentences.  Steady gait appreciated. Cap refill < 3 seconds.      History Acquisition   Additional history was acquired from other historians.  Chart    The patient's list of active medical problems, social history, medications, and allergies as documented per RN staff has been reviewed.     Differential Diagnoses   Based on available information and the initial assessment, the working differential diagnoses considered during this evaluation include but are not limited to gastritis, gastroenteritis, GERD, UTI, STD, others.    I will get urine, medicate and reassess.      LABS     Labs Reviewed   URINALYSIS, REFLEX TO URINE CULTURE - Abnormal       Result Value    Specimen UA Urine, Clean Catch      Color, UA Yellow      Appearance, UA Hazy (*)     pH, UA 6.0      Specific Gravity, UA  1.025      Protein, UA 1+ (*)     Glucose, UA Trace (*)     Ketones, UA 2+ (*)     Bilirubin (UA) Negative      Occult Blood UA Trace (*)     Nitrite, UA Negative      Urobilinogen, UA Negative      Leukocytes, UA Negative      Narrative:     Specimen Source->Urine   URINALYSIS MICROSCOPIC - Abnormal    RBC, UA 3      WBC, UA 35 (*)     Bacteria Few (*)     Hyaline Casts, UA 0      Granular Casts, UA 1 (*)     Microscopic Comment SEE COMMENT      Narrative:     Specimen Source->Urine   CULTURE, URINE   POCT URINE PREGNANCY    POC Preg Test, Ur Negative       Acceptable Yes         Additional Consideration   All available testing was considered during the course of this workup.  Comorbidities taken into consideration during the patient's evaluation and treatment include weight, age.    Social determinants of health were taken into consideration during development of our treatment plan.    Medications   ondansetron disintegrating tablet 4 mg (4 mg Oral Given 11/10/24 2029)      ED Course as of 11/10/24 2115   Sun Nov 10, 2024   2100 UA negative for any acute infectious process.  Urine preg negative.  Patient reassessed after Zofran.  Patient states she was feeling much better.  Patient tolerating p.o. in the ED.  Patient advised to increase fluids and bland diet.  Take Zofran as needed for nausea.  Strict return to ED precautions discussed.  Patient verbalized understanding of this plan of care.  All questions and concerns addressed. [RZ]   2111 Patient is hemodynamically stable, vital signs are normal. Discharge instructions given. Return to ED precautions discussed. Follow up as directed. Pt verbalized understanding of this plan.  Pt is stable for discharge.  [RZ]      ED Course User Index  [RZ] Wendy Bautista NP             CLINICAL IMPRESSION  1. Nausea and vomiting, unspecified vomiting type         ED Disposition Condition    Discharge Stable            Instruction:  I see no indication of an  emergent process beyond that addressed during our encounter but have duly counseled the patient/family regarding the need for prompt follow-up as well as the indications that should prompt immediate return to the emergency room should new or worrisome developments occur.  The patient/family has been provided with verbal and printed direction regarding our final diagnosis(es) as well as instructions regarding use of OTC and/or Rx medications intended to manage the patient's aforementioned conditions including:  ED Prescriptions       Medication Sig Dispense Start Date End Date Auth. Provider    ondansetron (ZOFRAN-ODT) 4 MG TbDL Take 1 tablet (4 mg total) by mouth every 8 (eight) hours as needed (nausea). 15 tablet 11/10/2024 11/15/2024 Wendy Bautista NP          Patient has been advised of following recommended follow-up instructions:  Follow-up Information       Follow up With Specialties Details Why Contact Info    PCP  Schedule an appointment as soon as possible for a visit  As needed           The patient/family communicates understanding of all this information and all remaining questions and concerns were addressed at this time.      The patient's condition did not warrant review of the  and prescription of controlled substances.      This note was created using dictation software.  This program may occasionally mistype words and phrases.         Wendy Bautista NP  11/10/24 1107

## 2024-11-12 LAB — BACTERIA UR CULT: NORMAL

## 2024-11-19 ENCOUNTER — PATIENT MESSAGE (OUTPATIENT)
Dept: RESEARCH | Facility: HOSPITAL | Age: 29
End: 2024-11-19

## 2025-08-03 ENCOUNTER — HOSPITAL ENCOUNTER (EMERGENCY)
Facility: OTHER | Age: 30
Discharge: HOME | End: 2025-08-03
Attending: EMERGENCY MEDICINE

## 2025-08-03 VITALS
HEART RATE: 64 BPM | DIASTOLIC BLOOD PRESSURE: 58 MMHG | TEMPERATURE: 98 F | RESPIRATION RATE: 20 BRPM | OXYGEN SATURATION: 98 % | SYSTOLIC BLOOD PRESSURE: 126 MMHG

## 2025-08-03 DIAGNOSIS — R19.7 NAUSEA, VOMITING, AND DIARRHEA: Primary | ICD-10-CM

## 2025-08-03 DIAGNOSIS — R93.5 ABNORMAL ABDOMINAL CT SCAN: ICD-10-CM

## 2025-08-03 DIAGNOSIS — R11.2 NAUSEA, VOMITING, AND DIARRHEA: Primary | ICD-10-CM

## 2025-08-03 DIAGNOSIS — R10.32 LEFT LOWER QUADRANT ABDOMINAL PAIN: ICD-10-CM

## 2025-08-03 DIAGNOSIS — E11.9 NEWLY DIAGNOSED DIABETES: ICD-10-CM

## 2025-08-03 PROBLEM — O14.13 SEVERE PRE-ECLAMPSIA IN THIRD TRIMESTER: Status: RESOLVED | Noted: 2023-09-18 | Resolved: 2025-08-03

## 2025-08-03 PROBLEM — Z30.431 ENCOUNTER FOR ROUTINE CHECKING OF INTRAUTERINE CONTRACEPTIVE DEVICE (IUD): Status: RESOLVED | Noted: 2024-02-04 | Resolved: 2025-08-03

## 2025-08-03 PROBLEM — B37.31 YEAST VAGINITIS: Status: RESOLVED | Noted: 2023-11-06 | Resolved: 2025-08-03

## 2025-08-03 LAB
ABSOLUTE EOSINOPHIL (OHS): 0.11 K/UL
ABSOLUTE MONOCYTE (OHS): 0.48 K/UL (ref 0.3–1)
ABSOLUTE NEUTROPHIL COUNT (OHS): 6.99 K/UL (ref 1.8–7.7)
ALBUMIN SERPL BCP-MCNC: 4.3 G/DL (ref 3.5–5.2)
ALP SERPL-CCNC: 77 UNIT/L (ref 40–150)
ALT SERPL W/O P-5'-P-CCNC: 9 UNIT/L (ref 10–44)
ANION GAP (OHS): 11 MMOL/L (ref 8–16)
APTT PPP: 25.2 SECONDS (ref 21–32)
AST SERPL-CCNC: 16 UNIT/L (ref 11–45)
BASOPHILS # BLD AUTO: 0.01 K/UL
BASOPHILS NFR BLD AUTO: 0.1 %
BILIRUB SERPL-MCNC: 1.1 MG/DL (ref 0.1–1)
BILIRUB UR QL STRIP.AUTO: NEGATIVE
BUN SERPL-MCNC: 12 MG/DL (ref 6–20)
CALCIUM SERPL-MCNC: 9.1 MG/DL (ref 8.7–10.5)
CHLORIDE SERPL-SCNC: 103 MMOL/L (ref 95–110)
CLARITY UR: CLEAR
CO2 SERPL-SCNC: 23 MMOL/L (ref 23–29)
COLOR UR AUTO: YELLOW
CREAT SERPL-MCNC: 0.7 MG/DL (ref 0.5–1.4)
CREAT SERPL-MCNC: 0.8 MG/DL (ref 0.5–1.4)
EAG (OHS): 108 MG/DL (ref 68–131)
ERYTHROCYTE [DISTWIDTH] IN BLOOD BY AUTOMATED COUNT: 12.2 % (ref 11.5–14.5)
GFR SERPLBLD CREATININE-BSD FMLA CKD-EPI: >60 ML/MIN/1.73/M2
GLUCOSE SERPL-MCNC: 207 MG/DL (ref 70–110)
GLUCOSE UR QL STRIP: NEGATIVE
HBA1C MFR BLD: 5.4 % (ref 4–5.6)
HCG INTACT+B SERPL-ACNC: <2.42 MIU/ML
HCT VFR BLD AUTO: 40.7 % (ref 37–48.5)
HCV AB SERPL QL IA: NORMAL
HGB BLD-MCNC: 14.5 GM/DL (ref 12–16)
HGB UR QL STRIP: NEGATIVE
HIV 1+2 AB+HIV1 P24 AG SERPL QL IA: NORMAL
HOLD SPECIMEN: 0
IMM GRANULOCYTES # BLD AUTO: 0.02 K/UL (ref 0–0.04)
IMM GRANULOCYTES NFR BLD AUTO: 0.2 % (ref 0–0.5)
INR PPP: 1 (ref 0.8–1.2)
KETONES UR QL STRIP: ABNORMAL
LEUKOCYTE ESTERASE UR QL STRIP: NEGATIVE
LIPASE SERPL-CCNC: 6 U/L (ref 4–60)
LYMPHOCYTES # BLD AUTO: 1.22 K/UL (ref 1–4.8)
MCH RBC QN AUTO: 30.2 PG (ref 27–31)
MCHC RBC AUTO-ENTMCNC: 35.6 G/DL (ref 32–36)
MCV RBC AUTO: 85 FL (ref 82–98)
NITRITE UR QL STRIP: NEGATIVE
NUCLEATED RBC (/100WBC) (OHS): 0 /100 WBC
PH UR STRIP: 6 [PH]
PLATELET # BLD AUTO: 305 K/UL (ref 150–450)
PMV BLD AUTO: 10.5 FL (ref 9.2–12.9)
POTASSIUM SERPL-SCNC: 3 MMOL/L (ref 3.5–5.1)
PROT SERPL-MCNC: 7.9 GM/DL (ref 6–8.4)
PROT UR QL STRIP: ABNORMAL
PROTHROMBIN TIME: 11.4 SECONDS (ref 9–12.5)
RBC # BLD AUTO: 4.8 M/UL (ref 4–5.4)
RELATIVE EOSINOPHIL (OHS): 1.2 %
RELATIVE LYMPHOCYTE (OHS): 13.8 % (ref 18–48)
RELATIVE MONOCYTE (OHS): 5.4 % (ref 4–15)
RELATIVE NEUTROPHIL (OHS): 79.3 % (ref 38–73)
SAMPLE: NORMAL
SODIUM SERPL-SCNC: 137 MMOL/L (ref 136–145)
SP GR UR STRIP: 1.02
UROBILINOGEN UR STRIP-ACNC: NEGATIVE EU/DL
WBC # BLD AUTO: 8.83 K/UL (ref 3.9–12.7)

## 2025-08-03 PROCEDURE — 83690 ASSAY OF LIPASE: CPT | Performed by: EMERGENCY MEDICINE

## 2025-08-03 PROCEDURE — 81003 URINALYSIS AUTO W/O SCOPE: CPT | Performed by: EMERGENCY MEDICINE

## 2025-08-03 PROCEDURE — 99285 EMERGENCY DEPT VISIT HI MDM: CPT | Mod: 25

## 2025-08-03 PROCEDURE — 85730 THROMBOPLASTIN TIME PARTIAL: CPT | Performed by: EMERGENCY MEDICINE

## 2025-08-03 PROCEDURE — 87389 HIV-1 AG W/HIV-1&-2 AB AG IA: CPT | Performed by: EMERGENCY MEDICINE

## 2025-08-03 PROCEDURE — 96374 THER/PROPH/DIAG INJ IV PUSH: CPT

## 2025-08-03 PROCEDURE — 94761 N-INVAS EAR/PLS OXIMETRY MLT: CPT

## 2025-08-03 PROCEDURE — 80053 COMPREHEN METABOLIC PANEL: CPT | Performed by: EMERGENCY MEDICINE

## 2025-08-03 PROCEDURE — 63600175 PHARM REV CODE 636 W HCPCS: Performed by: EMERGENCY MEDICINE

## 2025-08-03 PROCEDURE — 25500020 PHARM REV CODE 255: Performed by: EMERGENCY MEDICINE

## 2025-08-03 PROCEDURE — 94760 N-INVAS EAR/PLS OXIMETRY 1: CPT

## 2025-08-03 PROCEDURE — 83036 HEMOGLOBIN GLYCOSYLATED A1C: CPT | Performed by: EMERGENCY MEDICINE

## 2025-08-03 PROCEDURE — 96375 TX/PRO/DX INJ NEW DRUG ADDON: CPT

## 2025-08-03 PROCEDURE — 85610 PROTHROMBIN TIME: CPT | Performed by: EMERGENCY MEDICINE

## 2025-08-03 PROCEDURE — 25000003 PHARM REV CODE 250: Performed by: EMERGENCY MEDICINE

## 2025-08-03 PROCEDURE — 85025 COMPLETE CBC W/AUTO DIFF WBC: CPT | Performed by: EMERGENCY MEDICINE

## 2025-08-03 PROCEDURE — 84702 CHORIONIC GONADOTROPIN TEST: CPT | Performed by: EMERGENCY MEDICINE

## 2025-08-03 PROCEDURE — 86803 HEPATITIS C AB TEST: CPT | Performed by: EMERGENCY MEDICINE

## 2025-08-03 RX ORDER — METFORMIN HYDROCHLORIDE 500 MG/1
500 TABLET ORAL
Qty: 30 TABLET | Refills: 0 | Status: SHIPPED | OUTPATIENT
Start: 2025-08-03 | End: 2026-08-03

## 2025-08-03 RX ORDER — HYOSCYAMINE SULFATE 0.12 MG/1
0.12 TABLET SUBLINGUAL EVERY 4 HOURS PRN
Qty: 15 TABLET | Refills: 0 | Status: SHIPPED | OUTPATIENT
Start: 2025-08-03

## 2025-08-03 RX ORDER — HYOSCYAMINE SULFATE 0.12 MG/1
0.12 TABLET SUBLINGUAL
Status: COMPLETED | OUTPATIENT
Start: 2025-08-03 | End: 2025-08-03

## 2025-08-03 RX ORDER — ONDANSETRON 4 MG/1
8 TABLET, ORALLY DISINTEGRATING ORAL EVERY 6 HOURS PRN
Qty: 12 TABLET | Refills: 0 | Status: SHIPPED | OUTPATIENT
Start: 2025-08-03

## 2025-08-03 RX ORDER — ONDANSETRON HYDROCHLORIDE 2 MG/ML
8 INJECTION, SOLUTION INTRAVENOUS ONCE AS NEEDED
Status: COMPLETED | OUTPATIENT
Start: 2025-08-03 | End: 2025-08-03

## 2025-08-03 RX ORDER — METOCLOPRAMIDE HYDROCHLORIDE 5 MG/ML
10 INJECTION INTRAMUSCULAR; INTRAVENOUS ONCE AS NEEDED
Status: COMPLETED | OUTPATIENT
Start: 2025-08-03 | End: 2025-08-03

## 2025-08-03 RX ORDER — CIPROFLOXACIN 500 MG/1
500 TABLET, FILM COATED ORAL 2 TIMES DAILY
Qty: 10 TABLET | Refills: 0 | Status: SHIPPED | OUTPATIENT
Start: 2025-08-05 | End: 2025-08-10

## 2025-08-03 RX ADMIN — HYOSCYAMINE SULFATE 0.12 MG: 0.12 TABLET SUBLINGUAL at 10:08

## 2025-08-03 RX ADMIN — ONDANSETRON 8 MG: 2 INJECTION INTRAMUSCULAR; INTRAVENOUS at 10:08

## 2025-08-03 RX ADMIN — METOCLOPRAMIDE 10 MG: 5 INJECTION, SOLUTION INTRAMUSCULAR; INTRAVENOUS at 10:08

## 2025-08-03 RX ADMIN — IOHEXOL 75 ML: 350 INJECTION, SOLUTION INTRAVENOUS at 11:08

## 2025-08-03 NOTE — DISCHARGE INSTRUCTIONS
Thank you for letting us take care of you today! It was nice meeting you, and I hope you feel better soon.     Call your primary care doctor to make the first available appointment.     Keep all your medical appointments.     Take your regular medication as prescribed. Contact your primary care provider before running out of medication, or for any problems obtaining them.    Do not drive or operate heavy machinery while taking opioid or muscle relaxing medications. Examples include norco, percocet, xanax, valium, flexeril.     Overuse or long term use of pain and sedating medication may lead to addiction, dependence, organ failure, family and work problems, legal problems, accidental overdose, and death.    If you do not have health insurance, you probably can afford it:  Call [phone] ([facility] hotline) or go to www.[email or web address].[place].gov    Your evaluation in the ER does not suggest any emergent or life threatening medical condition requiring admission or immediate intervention beyond that provided in the ER.   However, the signs of a serious problem sometimes take more time to appear.     Do not hesitate to return to the ER if any of the following occur:    Weakness, dizziness, fainting, or loss of consciousness   Fever of 100.4ºF (38ºC) or higher  Any worse symptoms  Any new or concerning symptoms    Causes of Diarrhea (Adult)  Diarrhea is when stools are loose and watery. This can be caused by:  Viral infections  Bacterial infections  Food poisoning  Parasites  Irritable bowel syndrome (IBS)  Inflammatory bowel diseases such as ulcerative colitis, Crohn's disease, and celiac disease  Food intolerance, such as to lactose, the sugar found in milk and milk products  Reaction to medicines like antibiotics, laxatives, cancer drugs, and antacids  Along with diarrhea, you may also have:  Abdominal pain and cramping  Nausea and vomiting  Loss of bowel control  Fever and chills  Bloody stools  In some cases, antibiotics may  help to treat diarrhea. You may have a stool sample test. This is done to see what is causing your diarrhea, and if antibiotics will help treat it. The results of a stool sample test may take up to 2 days. The healthcare provider may not give you antibiotics until he or she has the stool test results.  Diarrhea can cause dehydration. This is the loss of too much water and other fluids from the body. When this occurs, body fluid must be replaced. This can be done with oral rehydration solutions. Oral rehydration solutions are available at drugstores and grocery stores without a prescription.  Home care  Follow all instructions given by your healthcare provider. Rest at home for the next 24 hours, or until you feel better. Avoid caffeine, tobacco, and alcohol. These can make diarrhea, cramping, and pain worse.  If taking medicines:  Dont take over-the-counter diarrhea or nausea medicines unless your healthcare provider tells you to.  You may use acetaminophen or NSAID medicines like ibuprofen or naproxen to reduce pain and fever. Dont use these if you have chronic liver or kidney disease, or ever had a stomach ulcer or gastrointestinal bleeding. Don't use NSAID medicines if you are already taking one for another condition (like arthritis) or are on daily aspirin therapy (such as for heart disease or after a stroke). Talk with your healthcare provider first.  If antibiotics were prescribed, be sure you take them until they are finished. Dont stop taking them even when you feel better. Antibiotics must be taken as a full course.  To prevent the spread of illness:  Remember that washing with soap and water and using alcohol-based  is the best way to prevent the spread of infection.  Clean the toilet after each use.  Wash your hands before eating.  Wash your hands before and after preparing food. Keep in mind that people with diarrhea or vomiting should not prepare food for others.  Wash your hands after using  cutting boards, countertops, and knives that have been in contact with raw foods.  Wash and then peel fruits and vegetables.  Keep uncooked meats away from cooked and ready-to-eat foods.  Use a food thermometer when cooking. Cook poultry to at least 165°F (74°C). Cook ground meat (beef, veal, pork, lamb) to at least 160°F (71°C). Cook fresh beef, veal, lamb, and pork to at least 145°F (63°C).  Dont eat raw or undercooked eggs (poached or cheri side up), poultry, meat, or unpasteurized milk and juices.  Food and drinks  The main goal while treating vomiting or diarrhea is to prevent dehydration. This is done by taking small amounts of liquids often.  Keep in mind that liquids are more important than food right now. It is ok to not eat for a few days.   Drink only small amounts of liquids at a time.  Dont force yourself to eat, especially if you are having cramping, vomiting, or diarrhea. Dont eat large amounts at a time, even if you are hungry.  If you eat, avoid fatty, greasy, spicy, or fried foods.  Dont eat dairy foods or drink milk if you have diarrhea. These can make diarrhea worse.  During the first 24 hours you can try:  Oral rehydration solutions. Do not use sports drinks. They have too much sugar and not enough electrolytes.  Soft drinks without caffeine  Ginger ale  Water (plain or flavored)  Decaf tea or coffee  Clear broth, consommé, or bouillon  Gelatin, popsicles, or frozen fruit juice bars  The second 24 hours, if you are feeling better, you can add:  Hot cereal, plain toast, bread, rolls, or crackers  Plain noodles, rice, mashed potatoes, chicken noodle soup, or rice soup  Unsweetened canned fruit (no pineapple)  Bananas  As you recover:  Limit fat intake to less than 15 grams per day. Dont eat margarine, butter, oils, mayonnaise, sauces, gravies, fried foods, peanut butter, meat, poultry, or fish.  Limit fiber. Dont eat raw or cooked vegetables, fresh fruits except bananas, or bran  cereals.  Limit caffeine and chocolate.  Limit dairy.  Dont use spices or seasonings except salt.  Go back to your normal diet over time, as you feel better and your symptoms improve.  If the symptoms come back, go back to a simple diet or clear liquids.  Follow-up care  Follow up with your healthcare provider, or as advised. If a stool sample was taken or cultures were done, call the healthcare provider for the results as instructed.  Call 911  Call 911 if you have any of these symptoms:  Trouble breathing  Confusion  Extreme drowsiness or trouble walking  Loss of consciousness  Rapid heart rate  Chest pain  Stiff neck  Seizure  When to seek medical advice  Call your healthcare provider right away if any of these occur:  Abdominal pain that gets worse  Constant lower right abdominal pain  Continued vomiting and inability to keep liquids down  Blood in vomit or stool  Dark urine or no urine for 8 hours  dizziness  Passing out or almost passing out  New rash  You dont get better in 2 to 3 days  Fever of 100.4°F (38°C) or higher that doesnt get lower with medicine

## 2025-08-03 NOTE — ED TRIAGE NOTES
Pt c/o abdominal pain, n/v/d since attending a reunion a few days ago. Pt also reporting dark stools this AM. States she has been taking Pepto Bismol the last two days to attempt to alleviate symptoms.

## 2025-08-03 NOTE — ED PROVIDER NOTES
"  Source of History:  Medical record, patient      Chief complaint:  Per triage note: "Abdominal Pain (Llq pain associated with nausea and diarrhea. Pt reports she noticed her stool this am was dark black. )  "    HPI:    Patient presents for evaluation of left lower quadrant abdominal pain associated with frequent diarrhea, nausea, 1 episode of vomiting for last 3 days.  She had diarrhea 3-5 times daily until today.  She had had that her stool today was very dark, and did notice a small amount of bright red blood per rectum this morning.  She did take Pepto-Bismol yesterday.  She had some fevers and chills.  She reports multiple sick contacts from recent high school reunion.  LMP was several days ago.  She denies any urinary symptoms.      ROS:   See HPI for pertinent review of systems    Review of patient's allergies indicates:  No Known Allergies    PMH:  As per HPI and below:  History reviewed. No pertinent past medical history.    History reviewed. No pertinent surgical history.    Social History[1]    Physical Exam:      Nursing note and vitals reviewed.  BP (!) 126/58   Pulse 64   Temp 98.4 °F (36.9 °C)   Resp 20   SpO2 98%   Breastfeeding No     Constitutional: No distress.  Eyes: EOMI. No discharge. Anicteric.  HENT:   Neck: Normal range of motion. Neck supple.  Cardiovascular: Normal rate. No murmur, no gallop and no friction rub heard.   Pulmonary/Chest: No respiratory distress. Effort normal. No wheezes, no rales, no rhonchi.   Abdominal: Bowel sounds normal. Soft. No distension and no mass. There is mild left lower quadrant tenderness. There is no rebound, no guarding, no tenderness at McBurney's point.  Neurological: GCS 15. Alert and oriented to person, place, and time. No gross cranial nerve, light touch or strength deficit. Coordination normal.   Skin: Skin is warm and dry.   EXT: 2+ radial pulses.         Medical Decision Making / Independent Interpretations / External Records Reviewed:  " "    Patient is a 30-year-old female with no pertinent past medical history who presents for evaluation of left lower quadrant abdominal pain, 3 days left lower quadrant abdominal pain intermittently, diarrhea, nausea, 1 episode of vomiting.  She presented today because she had dark stool today.  She also noted a few spots of bright red blood per rectum.  She had some fevers and chills.  On exam, patient initially had left lower quadrant abdominal tenderness, was well-appearing with moist mucous membranes.  The initial differential included acute diverticulitis, infectious diarrhea, dehydration, gross metabolic abnormality, nephrolithiasis, pyelonephritis, colitis      ED Course as of 08/03/25 1444   Sun Aug 03, 2025   1201 Glucose(!): 207 [RC]   1333 I independently interpreted the patient's CT abd/pelvis, notable for :    "1. The urinary bladder is decompressed, likely the cause of mild wall thickening however correlation with urinalysis recommended.  2. Mild prominence of the right renal collecting system, nonspecific.  Correlation with urinalysis as warranted.  3. Short segment small bowel intussusception within the left lower quadrant, often times a transient finding.  No findings to suggest obstruction.  Correlation and follow-up as warranted.  4. Please see above for several additional findings."    Patient denies history of diabetes.     Given her abnormal CT findings, I offered the patient admission.  She is currently considering admission versus discharge. [RC]   140 Patient states her symptoms are well-controlled.  She denies history of diabetes including gestational diabetes.  We will start her on metformin.   I had a shared decision making conversation with the patient. The patient displays normal decision making capacity. I explained to the patient the nature of their illness, injury, or disease, and the risks and benefits of admission versus discharge. After a detailed discussion of these, pt states a " preference for discharge. All questions answered. I feel this decision is a reasonable balance of risks and benefits. The patient was encouraged to return at any point for continued, new, or concerning symptoms.    [RC]      ED Course User Index  [RC] George Lan MD              Procedures      Medications   metoclopramide injection 10 mg (10 mg Intravenous Given 8/3/25 1022)   ondansetron injection 8 mg (8 mg Intravenous Given 8/3/25 1022)   hyoscyamine SL tablet 0.125 mg (0.125 mg Sublingual Given 8/3/25 1030)   iohexoL (OMNIPAQUE 350) injection 75 mL (75 mLs Intravenous Given 8/3/25 1137)       --  I decided to obtain the patient's medical records. I reviewed patient's prior external notes / results:  specialist documentation   and pharmacy / prescription records.   --  Additional Medical Decision Making: Prescription drug management, Hospitalization or escalation of care considered, and Decision regarding advanced imaging      Launch MDCalc MDM  MDCalc MDM Module  Aug 03 2025 2:42 PM [George Lan]  Data:  - Independent interpretation: I independently reviewed the CT Abd+Pelvis W contr IV. See MDM section and/or ED Course for my interpretation. [George Lan]  - Test/documents/historian: 3+ tests ordered  Problems: Concern for High-risk intra-abdominal pathology: bowel obstruction, complicated diverticulitis, complicated acute pancreatitis  Additional encounter diagnoses: Newly diagnosed diabetes, Nausea, vomiting, and diarrhea, Abnormal abdominal CT scan  Risk: metoclopramide injection + 3 more (Rx drug management), CT Abd+Pelvis W contr IV (Iodinated IV contrast in low-risk patient)           No future appointments.       Diagnostic Impression:    1. Nausea, vomiting, and diarrhea    2. Newly diagnosed diabetes    3. Left lower quadrant abdominal pain    4. Abnormal abdominal CT scan             ED Disposition Condition    Discharge Good          ED Prescriptions       Medication Sig Dispense Start Date  End Date Auth. Provider    metFORMIN (GLUCOPHAGE) 500 MG tablet Take 1 tablet (500 mg total) by mouth daily with breakfast. 30 tablet 8/3/2025 8/3/2026 George Lan MD    ondansetron (ZOFRAN-ODT) 4 MG TbDL Take 2 tablets (8 mg total) by mouth every 6 (six) hours as needed (nausea or vomiting). 12 tablet 8/3/2025 -- George Lan MD    hyoscyamine 0.125 mg Subl Place 1 tablet (0.125 mg total) under the tongue every 4 (four) hours as needed. 15 tablet 8/3/2025 -- George Lan MD    ciprofloxacin HCl (CIPRO) 500 MG tablet Take 1 tablet (500 mg total) by mouth 2 (two) times daily. Started if still having diarrhea on Aug 5 for 5 days 10 tablet 8/5/2025 8/10/2025 George Lan MD          Follow-up Information       Follow up With Specialties Details Why Contact formerly [facility] Gastroenterology Schedule an appointment as soon as possible for a visit in 3 days For recheck with specialist, if your symptoms continue [address] [facility] [address]  [phone]                 [1]   Social History  Tobacco Use    Smoking status: Some Days     Types: Cigarettes     Passive exposure: Never    Smokeless tobacco: Never   Substance Use Topics    Alcohol use: Yes    Drug use: Not Currently     Types: Marijuana        George Lan MD  08/03/25 5442

## 2025-08-04 LAB — HOLD SPECIMEN: NORMAL

## 2025-08-09 ENCOUNTER — HOSPITAL ENCOUNTER (EMERGENCY)
Facility: OTHER | Age: 30
Discharge: LEFT BEFORE DISCHARGE | End: 2025-08-09

## 2025-08-09 VITALS
SYSTOLIC BLOOD PRESSURE: 125 MMHG | DIASTOLIC BLOOD PRESSURE: 74 MMHG | HEIGHT: 64 IN | RESPIRATION RATE: 18 BRPM | TEMPERATURE: 98 F | HEART RATE: 71 BPM | WEIGHT: 170 LBS | BODY MASS INDEX: 29.02 KG/M2 | OXYGEN SATURATION: 100 %

## 2025-08-09 PROCEDURE — 99900041 HC LEFT WITHOUT BEING SEEN- EMERGENCY
